# Patient Record
Sex: FEMALE | Race: BLACK OR AFRICAN AMERICAN | Employment: FULL TIME | URBAN - METROPOLITAN AREA
[De-identification: names, ages, dates, MRNs, and addresses within clinical notes are randomized per-mention and may not be internally consistent; named-entity substitution may affect disease eponyms.]

---

## 2017-02-01 ENCOUNTER — OFFICE VISIT (OUTPATIENT)
Dept: BARIATRICS/WEIGHT MGMT | Age: 32
End: 2017-02-01

## 2017-02-01 DIAGNOSIS — E66.9 OBESITY, CLASS II, BMI 35-39.9: Primary | ICD-10-CM

## 2017-02-02 LAB
% ALBUMIN, 58A: 58 % (ref 54–71)
ABSOLUTE IMMATURE GRANULOCYTES, AIG: 0 X10E3/UL (ref 0–0.1)
ADIPONECTIN: 9 UG/ML
ALB/GLOBRATIO, 58C: 1.4 (ref 1.15–2.5)
ALBUMIN SERPL-MCNC: 4.5 G/DL (ref 3.7–5.1)
ALP SERPL-CCNC: 69 U/L (ref 35–125)
ALT SERPL-CCNC: 12 U/L
ANION GAP SERPL CALC-SCNC: 13 MMOL/L (ref 6–18)
APOLIPOPROTEIN B , 48: 126 MG/DL
AST SERPL W P-5'-P-CCNC: 13 U/L (ref 5–32)
BASOPHILS # BLD: 1 % (ref 0–3)
BASOPHILS NFR BLD: 0.1 X10E3/UL (ref 0–0.2)
BILIRUB SERPL-MCNC: 0.5 MG/DL
BUN SERPL-MCNC: 6 MG/DL (ref 6–20)
BUN/CREATININE RATIO,BUCR: 10 (ref 10–27)
CALCIUM SERPL-MCNC: 9.6 MG/DL (ref 8.8–10.5)
CHLORIDE SERPL-SCNC: 104 MMOL/L (ref 98–110)
CHOLEST SERPL-MCNC: 228 MG/DL
CO2 SERPL-SCNC: 23 MMOL/L (ref 19–31)
CREAT SERPL-MCNC: 0.6 MG/DL (ref 0.5–0.9)
CRP SERPL HS-MCNC: 3 MG/L
EOSINOPHIL # BLD: 4 % (ref 0–7)
EOSINOPHIL NFR BLD: 0.3 X10E3/UL (ref 0–0.4)
ERYTHROCYTE [DISTWIDTH] IN BLOOD BY AUTOMATED COUNT: 14.1 % (ref 11.7–15)
ESTIMATED AVERAGE GLUCOSE, EAG: 122.6 MG/DL
FIBRINOGEN ANTIGEN, 117051: 508 MG/DL (ref 126–437)
GLOBCALC, 58B: 3.2 G/DL (ref 1.9–3.5)
GLUCOSE SERPL-MCNC: 81 MG/DL (ref 70–99)
GRANULOCYTES,GRANS: 60 % (ref 40–74)
HCT VFR BLD AUTO: 41 % (ref 34–44)
HDLC SERPL-MCNC: 58 MG/DL
HGB BLD-MCNC: 13.4 G/DL (ref 11.5–15)
HGBA1C-T, HDL6300: 5.9 %
INSULIN,INS: 16 UU/ML (ref 3–9)
LDLC SERPL CALC-MCNC: 167 MG/DL
LP(A)-P, HDL4000: 268 NMOL/L
LP-PLA2, 123241: 329 NG/ML
LYMPHOCYTES # BLD: 2.8 X10E3/UL (ref 0.7–4.5)
LYMPHOCYTES NFR BLD: 30 % (ref 14–46)
Lab: 0
Lab: 1.02 (ref 1–1.03)
Lab: 8
Lab: ABNORMAL
Lab: CLEAR
Lab: PRESENT
Lab: YELLOW
MAGNESIUM SERPL-MCNC: 2.1 MG/DL (ref 1.6–2.4)
MCH RBC QN AUTO: 25 PG (ref 27–34)
MCHC RBC AUTO-ENTMCNC: 32 G/DL (ref 32–36)
MCV RBC AUTO: 79 FL (ref 80–98)
MONOCYTES # BLD: 0.5 X10E3/UL (ref 0.1–1)
MONOCYTES NFR BLD: 5 % (ref 4–13)
MPOAU: 419 PMOL/L
NEUTROPHILS # BLD AUTO: 5.6 X10E3/UL (ref 1.8–7.8)
NON-HDL CHOLESTEROL, 011976: 170 MG/DL
PLATELET # BLD AUTO: 443 X10E3/UL (ref 140–415)
POTASSIUM SERPL-SCNC: 4.5 MMOL/L (ref 3.5–5.3)
PROT SERPL-MCNC: 7.7 G/DL (ref 6.1–8)
RBC # BLD AUTO: 5.3 X10E6/UL (ref 3.8–5.1)
SMALL DENSE LDL, 47: 34 MG/DL
SODIUM SERPL-SCNC: 140 MMOL/L (ref 133–145)
TRIG ALIAS-LP(A)-P: 83
TRIGL SERPL-MCNC: 83 MG/DL (ref ?–150)
TSH SERPL-ACNC: 9.22 UIU/ML (ref 0.27–4.2)
URATE SERPL-MCNC: 4.7 MG/DL (ref 2–6.9)
WBC # BLD AUTO: 9.4 X10E3/UL (ref 4–10.5)

## 2017-02-03 LAB
25(OH)D3 SERPL-MCNC: 12 NG/ML (ref 30–100)
ALPHA LINOLEIC ACID N3, HDL1202: 0.17 % (ref 0.1–0.4)
CIS-MONO-UNSATURATED FATTY ACID TOTAL, HDL1205: 14 (ref 11.5–20.5)
DOCOSAPENTAENOIC ACID N3, HDL1206: 2.63 % (ref 0.6–4.1)
DOCOSAPENTAENOIC ACID N6, HDL1207: 0.91 % (ref 0.1–1.3)
HDL HDL-P, HDL5001: 42.7 UMOL/L
HDL LDL-P, HDL5000: 2081 NMOL/L
HDL SLDL-P, HDL5002: 1114 NMOL/L
LEPTIN, SERUM, 146711: 90 NG/ML
OMEGA-3 FATTY ACID TOTAL, HDL1220: 6.9 (ref 0.1–14.1)
OMEGA-3 INDEX, HDL1219: 4.1 (ref 0.1–10.4)
OMEGA-6 FATTY ACID TOTAL, HDL1222: 37.4 (ref 28.6–44.5)
SATURATED FATTY ACID TOTAL, HDL1226: 41 (ref 36.6–42)
TRANS FATTY ACID TOTAL, HDL1232: 0.7 (ref 0.1–1.8)
TRANSLINOLEIC ACID, HDL1229: 0.11 % (ref 0.1–0.5)
TRANSOLEIC ACID, HDL1230: 0.5 % (ref 0.1–1.3)

## 2017-02-05 LAB
CAMPESTEROL RATIO, HDL1303: 131 (ref 115–240)
CAMPESTEROL, HDL1302: 3.09 UG/ML (ref 2.11–4.43)
CHOLESTANOL RATIO, HDL1305: 81 (ref 117–194)
CHOLESTANOL, HDL1304: 1.86 UG/ML (ref 2.02–3.47)
DESMOSTEROL RATIO, HDL1307: 58 (ref 31–64)
DESMOSTEROL, HDL1306: 1.32 UG/ML
SITOSTEROL RATIO, HDL1301: 79 (ref 76–168)
SITOSTEROL, HDL1300: 1.93 UG/ML (ref 1.43–3.17)

## 2017-02-06 ENCOUNTER — TELEPHONE (OUTPATIENT)
Dept: FAMILY MEDICINE CLINIC | Age: 32
End: 2017-02-06

## 2017-02-06 NOTE — TELEPHONE ENCOUNTER
Patient calling to say she is changing pharmacies from [facility] to [facility] on [facility] and [facility]. Her contact # is [phone].

## 2017-02-07 NOTE — PROGRESS NOTES
Patient attended a Medically Supervised Weight Loss New Patient Orientation today where we discussed:  - New Direction Very Low Calorie Diet details  - Medical Supervision  - Nutrition education  - Cost  - Policies and compliance required for program enrollment. - Lab slip was given to patient with instructions for having them drawn. Patients initial consultation with physician is tentatively scheduled for:  Future Appointments  Date Time Provider Shawanda Garcia   2/15/2017 8:30 AM DO NEHA Argueta [address] starting weight was documented as: There were no vitals taken for this visit. Patient attended a Medically Supervised Weight Loss New Patient Orientation today where we discussed:  - New Direction Very Low Calorie Diet details  - Medical Supervision  - Nutrition education  - Cost  - Policies and compliance required for program enrollment. - Lab slip was given to patient with instructions for having them drawn. Patients initial consultation with physician is tentatively scheduled for:  Future Appointments  Date Time Provider Shawanda Garcia   2/15/2017 8:30 AM DO NEHA Argueta REKHA SCHED         There were no vitals taken for this visit.

## 2017-02-15 ENCOUNTER — OFFICE VISIT (OUTPATIENT)
Dept: FAMILY MEDICINE CLINIC | Age: 32
End: 2017-02-15

## 2017-02-15 VITALS
OXYGEN SATURATION: 98 % | HEIGHT: 62 IN | SYSTOLIC BLOOD PRESSURE: 108 MMHG | HEART RATE: 84 BPM | DIASTOLIC BLOOD PRESSURE: 77 MMHG | WEIGHT: 210.8 LBS | TEMPERATURE: 98.5 F | BODY MASS INDEX: 38.79 KG/M2 | RESPIRATION RATE: 18 BRPM

## 2017-02-15 DIAGNOSIS — E78.00 PURE HYPERCHOLESTEROLEMIA: ICD-10-CM

## 2017-02-15 DIAGNOSIS — R73.9 HYPERGLYCEMIA: ICD-10-CM

## 2017-02-15 DIAGNOSIS — E66.9 OBESITY, CLASS II, BMI 35-39.9: Primary | ICD-10-CM

## 2017-02-15 DIAGNOSIS — Z13.31 DEPRESSION SCREENING: ICD-10-CM

## 2017-02-15 DIAGNOSIS — E63.0 ESSENTIAL FATTY ACID (EFA) DEFICIENCY: ICD-10-CM

## 2017-02-15 DIAGNOSIS — E16.1 HYPERINSULINEMIA: ICD-10-CM

## 2017-02-15 DIAGNOSIS — R79.82 ELEVATED C-REACTIVE PROTEIN (CRP): ICD-10-CM

## 2017-02-15 DIAGNOSIS — E03.4 HYPOTHYROIDISM DUE TO ACQUIRED ATROPHY OF THYROID: ICD-10-CM

## 2017-02-15 DIAGNOSIS — E55.9 VITAMIN D DEFICIENCY: ICD-10-CM

## 2017-02-15 DIAGNOSIS — Z82.49 FAMILY HISTORY OF HEART DISEASE IN MALE FAMILY MEMBER BEFORE AGE 55: ICD-10-CM

## 2017-02-15 RX ORDER — ERGOCALCIFEROL 1.25 MG/1
50000 CAPSULE ORAL
Qty: 5 CAP | Refills: 2 | Status: SHIPPED | OUTPATIENT
Start: 2017-02-15 | End: 2017-04-16

## 2017-02-15 NOTE — PATIENT INSTRUCTIONS
Learning About Healthy Weight  What is a healthy weight? A healthy weight is the weight at which you feel good about yourself and have energy for work and play. It's also one that lowers your risk for health problems. What can you do to stay at a healthy weight? It can be hard to stay at a healthy weight, especially when fast food, vending-machine snacks, and processed foods are so easy to find. And with your busy lifestyle, activity may be low on your list of things to do. But staying at a healthy weight may be easier than you think. Here are some dos and don'ts for staying at a healthy weight:  Do eat healthy foods  The kinds of foods you eat have a big impact on both your weight and your health. Reaching and staying at a healthy weight is not about going on a diet. It's about making healthier food choices every day and changing your diet for good. Healthy eating means eating a variety of foods so that you get all the nutrients you need. Your body needs protein, carbohydrate, and fats for energy. They keep your heart beating, your brain active, and your muscles working. On most days, try to eat from each food group. This means eating a variety of:  · Whole grains, such as whole wheat breads and pastas. · Fruits and vegetables. · Dairy products, such as low-fat milk, yogurt, and cheese. · Lean proteins, such as all types of fish, chicken without the skin, and beans. Don't have too much or too little of one thing. All foods, if eaten in moderation, can be part of healthy eating. Even sweets can be okay. If your favorite foods are high in fat, salt, sugar, or calories, limit how often you eat them. Eat smaller servings, or look for healthy substitutes. Do watch what you eat  Many people eat more than their bodies need. Part of staying at a healthy weight means learning how much food you really need from day to day and not eating more than that.  Even with healthy foods, eating too much can make you gain weight. Having a well-balanced diet means that you eat enough, but not too much, and that your food gives you the nutrients you need to stay healthy. So listen to your body. Eat when you're hungry. Stop when you feel satisfied. It's a good idea to have healthy snacks ready for when you get hungry. Keep healthy snacks with you at work, in your car, and at home. If you have a healthy snack easily available, you'll be less likely to pick a candy bar or bag of chips from a vending machine instead. Some healthy snacks you might want to keep on hand are fruit, low-fat yogurt, string cheese, low-fat microwave popcorn, raisins and other dried fruit, nuts, whole wheat crackers, pretzels, carrots, celery sticks, and broccoli. Do some physical activity  A big part of reaching and staying at a healthy weight is being active. When you're active, you burn calories. This makes it easier to reach and stay at a healthy weight. When you're active on a regular basis, your body burns more calories, even when you're at rest. Being active helps you lose fat and build lean muscle. Try to be active for at least 1 hour every day. This may sound like a lot, but it's okay to be active in smaller blocks of time that add up to 1 hour a day. Any activity that makes your heart beat faster and keeps it there for a while counts. A brisk walk, run, or swim will get your heart beating faster. So will climbing stairs, shooting baskets, or cycling. Even some household chores like vacuuming and mowing the lawn will get your heart rate up. Pick activities that you enjoyones that make your heart beat faster, your muscles stronger, and your muscles and joints more flexible. If you find more than one thing you like doing, do them all. You don't have to do the same thing every day. Don't diet  Diets don't work. Diets are temporary. Because you give up so much when you diet, you may be hungry and think about food all the time.  And after you stop dieting, you also may overeat to make up for what you missed. Most people who diet end up gaining back the pounds they lostand more. Remember that healthy bodies come in lots of shapes and sizes. Everyone can get healthier by eating better and being more active. Where can you learn more? Go to http://anna-alistair.info/. Enter 192 5067 in the search box to learn more about \"Learning About Healthy Weight. \"  Current as of: February 16, 2016  Content Version: 11.1  © 4467-7290 [employer]. Care instructions adapted under license by [employer] (which disclaims liability or warranty for this information). If you have questions about a medical condition or this instruction, always ask your healthcare professional. [address] any warranty or liability for your use of this information. Learning About Obesity  What is obesity? Obesity means having so much body fat that your health is in danger. Having too much body fat can lead to type 2 diabetes, heart disease, high blood pressure, arthritis, sleep apnea, and stroke. Even if you don't feel bad now, think about these health risks. Do they seem like a good reason to start on a new path toward a healthier weight? Or do you have another personal, powerful reason for wanting to lose weight? Whatever it is, keep it in mind. It can be hard to change eating habits and exercise habits. But with your own reason and plan, you can do it. How do you know if your weight is in the obesity range? To know if your weight is in the obesity range, your doctor looks at your body mass index (BMI) and waist size. Your BMI is a number that is calculated from your weight and your height. To figure your BMI for yourself, get a BMI table from your doctor or use an online tool, such as http://www.schmidt.com/ on the [employer] of [employer].   What causes obesity? When you take in more calories than you burn off, you gain weight. How you eat, how active you are, and other things affect how your body uses calories and whether you gain weight. If you have family members who have too much body fat, you may have inherited a tendency to gain weight. And your family also helps form your eating and lifestyle habits, which can lead to obesity. Also, our busy lives make it harder to plan and cook healthy meals. For many of us, it's easier to reach for prepared foods, go out to eat, or go to the drive-through. But these foods are often high in saturated fat and calories. Portions are often too large. What can you do to reach a healthy weight? Focus on health, not diets. Diets are hard to stay on and don't work in the long run. It is very hard to stay with a diet that includes lots of big changes in your eating habits. Instead of a diet, focus on lifestyle changes that will improve your health and achieve the right balance of energy and calories. To lose weight, you need to burn more calories than you take in. You can do it by eating healthy foods in reasonable amounts and becoming more active, even a little bit every day. Making small changes over time can add up to a lot. Make a plan for change. Many people have found that naming their reasons for change and staying focused on their plan can make a big difference. Work with your doctor to create a plan that is right for you. · Ask yourself: Illona Bogus are my personal, most powerful reasons for wanting this change? What will my life look like when I've made the change? \"  · Set your long-term goal. Make it specific, such as \"I will lose x pounds. \"  · Break your long-term goal into smaller, short-term goals. Make these small steps specific and within your reach, things you know you can do. These steps are what keep you going from day to day. How can you stay on your plan for change? Be ready.  Choose to start during a time when there are few events that might trigger slip-ups, like holidays, social events, and high-stress periods. Decide on your first few steps. Most people have more success when they make small changes, one step at a time. For example, you might switch a daily candy bar to a piece of fruit, walk 10 minutes more, or add more vegetables to a meal.  Line up your support people. Make sure you're not going to be alone as you make this change. Connect with people who understand how important it is to you. Ask family members and friends for help in keeping with your plan. And think about who could make it harder for you, and how to handle them. Try tracking. People who keep track of what they eat, feel, and do are better at losing weight. Try writing down things like:  · What and how much you eat. · How you feel before and after each meal.  · Details about each meal (like eating out or at home, eating alone, or with friends or family). · What you do to be active. Look and plan. As you track, look for patterns that you may want to change. Take note of:  · When you eat and whether you skip meals. · How often you eat out. · How many fruits and vegetables you eat. · When you eat beyond feeling full. · When and why you eat for reasons other than being hungry. When you stray from your plan, don't get upset. Figure out what made you slip up and how you can fix it. Can you take medicines or have surgery to lose weight? Before your doctor will prescribe medicines or surgery, he or she will probably want you to be more active and follow your healthy eating plan for a period of time. These habits are key lifelong changes for managing your weight, with or without other medical treatment. And these changes can help you avoid weight-related health problems. Follow-up care is a key part of your treatment and safety. Be sure to make and go to all appointments, and call your doctor if you are having problems.  It's also a good idea to know your test results and keep a list of the medicines you take. Where can you learn more? Go to http://anna-alistair.info/. Enter N111 in the search box to learn more about \"Learning About Obesity. \"  Current as of: February 16, 2016  Content Version: 11.1  © 3139-5792 [employer]. Care instructions adapted under license by [employer] (which disclaims liability or warranty for this information). If you have questions about a medical condition or this instruction, always ask your healthcare professional. [address] any warranty or liability for your use of this information. Starting a Weight Loss Plan: Care Instructions  Your Care Instructions  If you are thinking about losing weight, it can be hard to know where to start. Your doctor can help you set up a weight loss plan that best meets your needs. You may want to take a class on nutrition or exercise, or join a weight loss support group. If you have questions about how to make changes to your eating or exercise habits, ask your doctor about seeing a registered dietitian or an exercise specialist.  It can be a big challenge to lose weight. But you do not have to make huge changes at once. Make small changes, and stick with them. When those changes become habit, add a few more changes. If you do not think you are ready to make changes right now, try to pick a date in the future. Make an appointment to see your doctor to discuss whether the time is right for you to start a plan. Follow-up care is a key part of your treatment and safety. Be sure to make and go to all appointments, and call your doctor if you are having problems. Its also a good idea to know your test results and keep a list of the medicines you take. How can you care for yourself at home? · Set realistic goals. Many people expect to lose much more weight than is likely.  A weight loss of 5% to 10% of your body weight may be enough to improve your health. · Get family and friends involved to provide support. Talk to them about why you are trying to lose weight, and ask them to help. They can help by participating in exercise and having meals with you, even if they may be eating something different. · Find what works best for you. If you do not have time or do not like to cook, a program that offers meal replacement bars or shakes may be better for you. Or if you like to prepare meals, finding a plan that includes daily menus and recipes may be best.  · Ask your doctor about other health professionals who can help you achieve your weight loss goals. ¨ A dietitian can help you make healthy changes in your diet. ¨ An exercise specialist or  can help you develop a safe and effective exercise program.  ¨ A counselor or psychiatrist can help you cope with issues such as depression, anxiety, or family problems that can make it hard to focus on weight loss. · Consider joining a support group for people who are trying to lose weight. Your doctor can suggest groups in your area. Where can you learn more? Go to http://anna-alistair.info/. Enter V648 in the search box to learn more about \"Starting a Weight Loss Plan: Care Instructions. \"  Current as of: February 16, 2016  Content Version: 11.1  © 3046-2634 [employer]. Care instructions adapted under license by [employer] (which disclaims liability or warranty for this information). If you have questions about a medical condition or this instruction, always ask your healthcare professional. [place] [address] any warranty or liability for your use of this information. WEIGHT LOSS PLAN    1. Read food labels and count calories. Goal 2590-4432 calories daily for women and 2391-7545 calories daily for men. Achieve this by decreasing caloric intake by 500 calories by weekly increments.   NOTE:  1 pound = 3500 calories! Www.loseit. [place]  Www.myfitnesspal.com  Www.Ungalli. Affle  Www.[employer]ference[employer]. gov  Weight watchers mobile    Calories needed to lose 1-2 pounds a week = 10 x your weight in pounds    2. Increase water intake. Per [facility] Weight loss Program, it is important to drink 1/2 your body weight in ounces of water daily. Decrease your water consumption 2-3 hours before bedtime to prevent sleep disturbance from frequent urination. 3.  Decrease sugary beverages. Each can or glass of soda increases your risk of obesity by 60%. Can lose 10 pounds in a month by avoiding any soda. 12 oz can of soda = 140 calories, 16 oz cup of sweet tea = 200 calories    16 oz orange juice = 200 calories, 10 oz apple juice = 150 calories   32 oz sports drink = 200 calories, 16 oz punch = 240 calories   3.5 oz alcohol = 100-150 calories     4. Avoid High Fructose Corn Syrup products. This ingredient makes products highly addictive! 5. Exercise 30 minutes daily 5 days weekly, minimally. If you burn 3500 calories that equals a pound! Use a pedometer to count steps. Visit www. [employer] for a free pedometer and diet recommendations. Your maximum heart rate = 220 - your age    Never exercise at your maximum heart rate. See handout for target heart rate. 6.  Decrease carbohydrates (white bread, pasta, rice, potatoes, sweet foods and sweet drinks like soda, tea, coffee, juice and sports drinks). Increase fiber and protein. Goal:   calories daily of carbohydrates     Try CHROMIUM PICONATE 200 MG THREE TIMES DAILY,    to decrease Premenstrual carbohydrate cravings. 7.  Eat 3-5 small meals daily, include lots of protein (beans/legumes, nuts, lean meat, eggs) and green vegetables with each. (Breakfast, lunch, dinner with 2 healthy snacks)    8. Get proper rest 7-8 hours uninterrupted.   When you get less than 6 hours, it triggers hunger by affecting your [place]:Leptin ratio and this results in weight gain. 9.  Watch your food portions. Green leafy vegetables should cover 1/2 of the plate, lean meat 1/4 of the plate, starchy vegetable 1/4 of the plate. Use smaller plates. 10.  Do not eat until you are full. Eat until you are no longer hungry. If you are not sure, try drinking a glass of water before getting your second serving of food. 11.  Do not weigh yourself daily. Wait until your next office visit. Use how you feel and  how your clothes fit as measurements of success. 12.  Address your spirituality to draw strength from above during your journey. Remember \"I am fearfully and wonderfully made. Marvelous in His eyes. \"    13. Set realistic, appropriate and achievable weight loss goals:     RECOMMENDED TARGET WEIGHT LOSS:  Initial weight loss of 5-10% of your initial body weight achieved over 6 months or a decrease of 2 BMI units. MINIMUM GOAL OF WEIGHT LOSS:  Reduce body weight and maintain a lower body weight. Prevent weight gain. RELATED WEBSITES:      Www.obesityaction. org  (and consider joining the Obesity Action Coalition for $25/year. The [address] [place] [address] mission is to elevated and empower those affected by obesity through education, advocacy and support. Quarterly journals included in membership fee. )    Www.[employer]  www.[employer]Revenue     RELATED DIETS:  Dr. Steff Gracia Weight loss challenge, Mediterranean diet, [address] [place] [address] Watchers, Paleo Diet (anti-inflammatory diet)        EXERCISE 150 MINUTES WEEKLY. THIS CAN BE ACHIEVED BY WORKING OUT OR WALKING A MINIMUM OF 30 MINUTES FOR 5 DAYS WEEKLY. YOU CAN EXERCISE IN INCREMENTS OF 10-15 MINUTES UP TO 3 TIMES A DAY. Consider performing \"brainless exercise. \"  Choose your favorite tv program.  Five minutes before and for 5 minutes after the tv program, stretch your body. While the program is on, walk in place watching the show.   When commercials come on, rest or walk around the house to do other things. When the program begins, return to walking in place. When you are able keep walking during the commercials and add light weights to your ankles or hands. By the end of the show, you would have walked 30 minutes. If you need shorter spurts of exercise, walk during the commercials and rest during the show. Drink to glasses of water prior to any exercise to prevent dehydration and to improve the results of the work out. Your RESTING HEART RATE is the number of times your heart beats per minute when you are not exerting yourself. The more fit you are, the lower your resting heart rate will be. Your MAXIMUM HEART RATE is the number of times per minute your heart pumps when it is working at 100% capacity. NEVER EXERCISE AT YOUR MAXIMUM HEART RATE. MAX HEART RATE = 220 - your age    For example, in a 48year old, the maximum heart rate is 220-50 = 170 beats per minute    Your [place] is the number of beats per minute our heart should pump during aerobic exercise. Reaching your target heart rate indicates that your body is receiving maximum cardiovascular and fat burning benefits.      If you are fit, your TARGET HEART RATE = 70-85% of your maximum Heart rate      For a 48year old with vigorous intensity physical activity,         Target heart rate= 170 bpm x .70 = 119 bpm     Target heart rate = 170 bpm x .85=  145 bpm        Therefore, your target heart rate during physical activity is 119-145      If you are not fit, TARGET HEART RATE = 50-70% of your maximum Heart rate    MODERATE CONSISTENT AEROBIC EXERCISE OR WALKING FOR AT LEAST 150 MINUTES WEEKLY IS AN ESSENTIAL PART OF ANY WEIGHT LOSS OF WEIGHT MAINTENANCE PROGRAM.     During WEIGHT LOSS PHASE, at least 75% of your exercise needs to be walking or moderate aerobic activity and 25% or 0% can be Isometric or Resistance type exercises (the latter can be deferred to the weight maintenance phase.)     During WEIGHT MAINTENANCE PHASE, at least 50% of your exercise needs to be aerobic and 50% Isometric or Resistance type exercises. BENEFITS OF MODERATE INTENSITY EXERCISE MOST DAYS OF THE WEEK:     1. Insulin Resistance improves 30-85%   2. Abdominal Fat decreases by 30%   3. Inflammatory Markers decrease by 30% (therefore pain decreases)   4. Systolic and Diastolic Blood Pressure decrease by 4 mmHg   5. HDL improves by 5%   6. Triglycerides decrease by 15%   7. Shift from small dense LDL to large dense LDL (therefore decrease Insulin Resistance)   8. Decrease coagulability                  Starting a Weight Loss Plan: Care Instructions  Your Care Instructions  If you are thinking about losing weight, it can be hard to know where to start. Your doctor can help you set up a weight loss plan that best meets your needs. You may want to take a class on nutrition or exercise, or join a weight loss support group. If you have questions about how to make changes to your eating or exercise habits, ask your doctor about seeing a registered dietitian or an exercise specialist.  It can be a big challenge to lose weight. But you do not have to make huge changes at once. Make small changes, and stick with them. When those changes become habit, add a few more changes. If you do not think you are ready to make changes right now, try to pick a date in the future. Make an appointment to see your doctor to discuss whether the time is right for you to start a plan. Follow-up care is a key part of your treatment and safety. Be sure to make and go to all appointments, and call your doctor if you are having problems. Its also a good idea to know your test results and keep a list of the medicines you take. How can you care for yourself at home? · Set realistic goals. Many people expect to lose much more weight than is likely.  A weight loss of 5% to 10% of your body weight may be enough to improve your health. · Get family and friends involved to provide support. Talk to them about why you are trying to lose weight, and ask them to help. They can help by participating in exercise and having meals with you, even if they may be eating something different. · Find what works best for you. If you do not have time or do not like to cook, a program that offers meal replacement bars or shakes may be better for you. Or if you like to prepare meals, finding a plan that includes daily menus and recipes may be best.  · Ask your doctor about other health professionals who can help you achieve your weight loss goals. ¨ A dietitian can help you make healthy changes in your diet. ¨ An exercise specialist or  can help you develop a safe and effective exercise program.  ¨ A counselor or psychiatrist can help you cope with issues such as depression, anxiety, or family problems that can make it hard to focus on weight loss. · Consider joining a support group for people who are trying to lose weight. Your doctor can suggest groups in your area. Where can you learn more? Go to http://anna-alistair.info/. Enter A403 in the search box to learn more about \"Starting a Weight Loss Plan: Care Instructions. \"  Current as of: February 16, 2016  Content Version: 11.1  © 6634-0636 [employer]. Care instructions adapted under license by [employer] (which disclaims liability or warranty for this information). If you have questions about a medical condition or this instruction, always ask your healthcare professional. [place] [address] any warranty or liability for your use of this information.

## 2017-02-15 NOTE — PROGRESS NOTES
[facility] Progress Note: Initial Physician Visit     Tito Campos is a 32 y.o. female who is here for medical screening for entering the [facility].     CC: *** Obesity      Weight History  I personally reviewed the Medical Screening [place] completed by patient and scanned into media section of chart. It includes duration of their obesity, maximum weight, goal weight and weight gain time line (timing), all of which give the context of their obesity AND a Family History of their obesity. Is their Weight Loss Goal entered in to Comments? ***    Weight loss History  I personally reviewed the Medical Screening [place] completed by the patient and scanned into media section of chart. It includes number of weight loss attempts, the weight loss program that patients was most successful using, and if they have any hx of anorectic medication use, including OTC supplements. This captures modifying factors. Significant Medical History  Past Medical History   Diagnosis Date    Allergy, unspecified not elsewhere classified     Ankle sprain 2002     bilaterally. 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 Dr. Bernadine George, OB. Royce Addison, Genetic Counselor   Jordy Davies.  Elevated liver enzymes 2009    Heart palpitations 07/12/2016     Exertional.  Dr. Jak Reece. Negative Exercise Stress Test.    Hyperinsulinemia 5/22/2015    Hypothyroidism due to acquired atrophy of thyroid 2011    Pure hypercholesterolemia 07/2013       I personally reviewed the Medical Screening [place] completed by the patient and scanned into media section of chart. This allows me to assess associated symptoms that are significant in the assessment of the patient's obesity and the patient's Past Medical History.     Outpatient Prescriptions Marked as Taking for the 2/15/17 encounter (Office Visit) with Mario Llanos, DO   Medication Sig Dispense Refill    levothyroxine (SYNTHROID) 137 mcg tablet TAKE ONE TABLET BY MOUTH DAILY BEFORE BREAKFAST 27 Tab 11    SPRINTEC, 28, 0.25-35 mg-mcg tab 1 Tab daily. 5    albuterol (PROVENTIL HFA, VENTOLIN HFA) 90 mcg/actuation inhaler Take 2 Puffs by inhalation every four (4) hours as needed for Wheezing. Indications: BRONCHOSPASM PREVENTION 1 Inhaler 1         Significant Psychosocial History   Has a doctor every diagnosed with Binge Eating Disorder, Bulemia or Anorexia? : {gen no default/yes/free text:356258::\"no\"}     Compliance  Upcoming Travel? {gen no default/yes/free text:711042::\"no\"}    Social History  Social History   Substance Use Topics    Smoking status: Never Smoker    Smokeless tobacco: Never Used    Alcohol use 0.0 oz/week     0 Standard drinks or equivalent per week      Comment: occasional wine cooler       Exercise  I personally reviewed the Medical Screening [place] completed by the patient and scanned into media section of chart. Review of Systems  See HPI        Objective  Visit Vitals    /77 (BP 1 Location: Left arm, BP Patient Position: Sitting)    Pulse 84    Temp 98.5 °F (36.9 °C) (Oral)    Resp 18    Ht 5' 2.4\" (1.585 m)    Wt 210 lb 12.8 oz (95.6 kg)    LMP 12/22/2016 (Exact Date)    SpO2 98%    BMI 38.06 kg/m2         Weight Metrics 2/15/2017 2/15/2017 11/28/2016 11/28/2016 9/27/2016 9/27/2016 8/23/2016   Weight - 210 lb 12.8 oz - 200 lb 11.2 oz - 197 lb 9.6 oz -   Waist Measure Inches 43 - 41.75 - 44.5 - 42.5   Body Fat % 41.8 - 41.8 - 41.5 - 42.1   BMI - 38.06 kg/m2 - 36.71 kg/m2 - 36.14 kg/m2 -       Labs: See HDL labs scanned into Media section       Physical Exam    Vital Signs Reviewed  Weight Management Metrics Reviewed    Appearance: ***  HEENT:  Scleral icterus? {gen no default/yes/free text:061496::\"no\"}  Neck:  Thyromegaly or nodules?   {gen no default/yes/free text:458508::\"no\"}  Mouth: Large tongue {gen no default/yes/free text:270716::\"no\"}  Heart:  ***  Lungs:  ***  Abdomen:                Hepatomegaly? {gen no default/yes/free text:531905::\"no\"}              Striae present? {gen no default/yes/free text:510473::\"no\"}  Skin:               Acne? {gen no default/yes/free text:553753::\"no\"}              Hirsutism? {gen no default/yes/free text:651686::\"no\"}              Skin tags? {gen no default/yes/free text:645466::\"no\"}              Acanthosis Nigricans? {gen no default/yes/free text:450840::\"no\"}  Ext:  Edema? {gen no default/yes/free text:223716::\"no\"}      Assessment & Plan  No diagnosis found. 1. HDL labs reviewed w/ patient  2. EKG reviewed w/ patient:              Personally reviewed by me, NSR, No abnormalities,               QTc = *** sec (Upper limit is 440 for males & 460 for females)      3.  Medication changes include: ***    Based on his history, labs and EKG, Laura Emmanuel ***  a good candidate for the [facility] Loss Program     25 min of the 45 minutes face to face time with Laura consisted of counseling & coordinating and/or discussing treatment plans in reference to her *** @D

## 2017-02-15 NOTE — PROGRESS NOTES
Jn Marcano Medically Supervised   [facility]   [facility]    INITIAL PHYSICIAN VISIT    HISTORY OF PRESENT ILLNESS  Agree with nurse and registered dietician notes. Tito Campos is a 32 y.o. female with Obesity Class II, Body mass index is 38.06 kg/(m^2). and associated health consequences presents for evaluation and treatment of weight management at the kind request of Lorenza Brantley DO, pcp. Hyperglycemic, hyperinsulinemic pt with hypercholesterolemia, hypothyroidism, Vitamin D deficiency, and family hx of heart disease denies vision changes, headaches, dizziness, chest pain, SOB, or swelling. She did visit the virtual doctors and was dx'd with URI. They recommended OTC medications, which she takes on ocaassion prn. Feeling better. Weight History  Current weight 210 lbs, up from 200 lbs on 16. Lowest weight was 197 lbs in 2105. Highest weight was 225 lbs on 3/23/15. Goal weight is 160 lbs. How many pounds do you want to lose and by when? 50 lbs. How many weight loss attempts have you had? Previously ordered Nutrisystem but did not commit because the food was too salty and the food  quickly. Have you ever taken weight loss medications or herbal remedies? Yes   If yes: were they: rx'd by a doctor or OTC? Phendimetrazine last rx'd on 16, Phentermine in 2016, and Metformin in 10/2013. If yes: did you have any negative side effects? Metformin caused diarrhea. Have you ever had weight loss surgery? No    Sleep History  She admits she snores and believes it is due to allergies. Denies waking up gasping for air or daytime somnia. Eating Habits  How many times a week do you eat fast food or at restaurants? <2x weekly   Are you skipping meals and if so, why? Breakfast  Which meals do you eat? Lunch, Dinner, and Snack  Do you use a food almaz to track your calories? No  Do you have upcoming travel in the next 6 weeks?  No  Do you have a history of binge eating disorder, anorexia, and bulimia? No     Drinking Habits  How much caffeine do you drink daily? 1 cup of coffee qAM  How much alcohol do you drink daily and weekly? q3 weeks drinks 1 glass of wine ([place])   Do you consume any sugar-sweetened beverages (sodas, teas, juices, etc.)? Coffee with liquid coffee creamer. Cherry juice. How much water do you consume daily? About 3-16 oz bottles. Exercise  How many days a week do you currently exercise? 3   Have you ever been told by a physician not to exercise? No  Do you know of any reason you shouldn't exercise? No  Do you own a pedometer or fitness tracker? Yes, averages 8,000 steps. Has a sedentary job. Do you have a gym membership? No  What's your favorite and least favorite physical activity? Favorite: Squats and Least favorite: Jumping. Are you using a fitness almaz? No    Other History  Any history of drug abuse or dependence? No  Do you have any current major lifestyle changes or stressors? 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 Are you pregnant or planning on becoming pregnant within 6 months? Currently abstaining from sexual activity. How many times have you been pregnant? 2 times and 1 children. Any potential unsupportive people in your life? No. Has 2 other co-workers that are going to be supportive with drinking more water and shakes for breakfast.   Are you ready to lose weight and become healthier?  Yes    Depression Screening  PHQ 2 / 9, over the last two weeks 2/15/2017   Little interest or pleasure in doing things Not at all   Feeling down, depressed or hopeless Not at all   Total Score PHQ 2 0     Pt denies contraindications to participation in VLCD including hx of heart attack in the last 3 months, Type 1 DM, Type 2 DM, Livery or Kidney disease requiring protein restriction, Recent txs for Cancer, Hx of Uric-acid Kidney Stone, Gout, Gall stones, Recent onset of Inflammatory Bowel Disease, or Food Allergies. Written by aakash Lowe, as dictated by Dr. Olivia Yost DO.    ROS    Review of Systems negative except as noted above in HPI. ALLERGIES:    Allergies   Allergen Reactions    Latex Itching    Other Medication Rash     SPRINTEC    Metformin Diarrhea     severe    Mirena [Levonorgestrel] Other (comments)     Severe abdominal pain  GALACTORRHEA       CURRENT MEDICATIONS:    Outpatient Prescriptions Marked as Taking for the 2/15/17 encounter (Office Visit) with [place]oacano Jacob DO   Medication Sig Dispense Refill    levothyroxine (SYNTHROID) 137 mcg tablet TAKE ONE TABLET BY MOUTH DAILY BEFORE BREAKFAST 30 Tab 11       PAST MEDICAL HISTORY:    Past Medical History:   Diagnosis Date    Allergy, unspecified not elsewhere classified     Ankle sprain     bilaterally. 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 Dr. Geovanny Jeffries, OB. Abe Jarrett, Genetic Counselor   Beatrice Arreguin.  Elevated liver enzymes     Heart palpitations 2016    Exertional.  Dr. Krystina Osei. Negative Exercise Stress Test.    Hyperinsulinemia 2015    Hypothyroidism due to acquired atrophy of thyroid     Pure hypercholesterolemia 2013       PAST SURGICAL HISTORY:    Past Surgical History:   Procedure Laterality Date    HX  SECTION      HX DILATION AND CURETTAGE  2016    due to Cystic Hygroma/Terrell's Syndrome fetus. Dr. Neena Mendieta   (placed),  (removed)    IUD. Dr. Bert Mejia.  HX TONSIL AND ADENOIDECTOMY  childhood    HX WISDOM TEETH EXTRACTION  2016    All 4 removed.        FAMILY HISTORY:    Family History   Problem Relation Age of Onset   [facility] Arthritis-osteo Mother     Hypertension Mother    [facility] Migraines Mother     Other Mother      IBS    Hypertension Maternal Uncle     Asthma Maternal Uncle     Hypertension Maternal Grandmother     Other Maternal Grandmother      diverticulosis    Arthritis-osteo Maternal Grandmother     Cancer Maternal Grandfather      prostate    Arthritis-osteo Maternal Grandfather     Other Maternal Aunt      uterine fibroids    Obesity Other        SOCIAL HISTORY:    Social History     Social History    Marital status: SINGLE     Spouse name: N/A    Number of children: N/A    Years of education: N/A     Social History Main Topics    Smoking status: Never Smoker    Smokeless tobacco: Never Used    Alcohol use 0.6 oz/week     0 Standard drinks or equivalent, 1 Glasses of wine per week      Comment: every 3 weeks    Drug use: No    Sexual activity: Yes     Partners: Male     Birth control/ protection: Condom     Other Topics Concern    None     Social History Narrative       IMMUNIZATIONS:    Immunization History   Administered Date(s) Administered    Influenza Vaccine 10/31/2013         PHYSICAL EXAMINATION    Vital Signs    Visit Vitals    /77 (BP 1 Location: Left arm, BP Patient Position: Sitting)    Pulse 84    Temp 98.5 °F (36.9 °C) (Oral)    Resp 18    Ht 5' 2.4\" (1.585 m)    Wt 210 lb 12.8 oz (95.6 kg)    LMP 12/22/2016 (Exact Date)    SpO2 98%    BMI 38.06 kg/m2       Weight Metrics 2/23/2017 2/15/2017 2/15/2017 11/28/2016 11/28/2016 9/27/2016 9/27/2016   Weight 209 lb 14.4 oz - 210 lb 12.8 oz - 200 lb 11.2 oz - 197 lb 9.6 oz   Neck Circ (inches) - 13.5 - - - - -   Waist Measure Inches 41 43 - 41.75 - 44.5 -   Body Fat % - 41.8 - 41.8 - 41.5 -   BMI 37.9 kg/m2 - 38.06 kg/m2 - 36.71 kg/m2 - 36.14 kg/m2       General appearance - Well nourished. Well appearing. Well developed. No acute distress. Obese. Head - Normocephalic. Atraumatic. Eyes - pupils equal and reactive. Extraocular eye movements intact. Sclera anicteric. Mildly injected sclera. Ears - Hearing is grossly normal bilaterally. Nose - normal and patent. No polyps noted.   No erythema. No discharge. Mouth - mucous membranes with adequate moisture. Posterior pharynx normal with cobblestone appearance. No erythema, white exudate or obstruction. Neck - supple. Midline trachea. No carotid bruits noted bilaterally. No thyromegaly noted. Chest - clear to auscultation bilaterally anteriorly and posteriorly. No wheezes. No rales or rhonchi. Breath sounds are symmetrical bilaterally. Unlabored respirations. Heart - normal rate. Regular rhythm. Normal S1, S2. No murmur noted. No rubs, clicks or gallops noted. Abdomen - soft and distended. No masses or organomegaly. No rebound, rigidity or guarding. Bowel sounds normal x 4 quadrants. No tenderness noted. Neurological - awake, alert and oriented to person, place, and time and event. Cranial nerves II through XII intact. Clear speech. Muscle strength is +5/5 x 4 extremities. Sensation is intact to light touch bilaterally. Steady gait. Heme/Lymph - peripheral pulses normal x 4 extremities. No peripheral edema is noted. Musculoskeletal - Intact x 4 extremities. Full ROM x 4 extremities. No pain with movement. Back exam - normal range of motion. No pain on palpation of the spinous processes in the cervical, thoracic, lumbar, sacral regions. No CVA tenderness. No buffalo hump noted. Skin - no rashes, erythema, ecchymosis, lacerations, abrasions, suspicious moles noted. No skin tags or moles. No acanthosis nigricans noted in the axilla or neck. Psychological -   normal behavior, dress and thought processes. Good insight. Good eye contact. Normal affect. Happy mood. Normal speech. DATA REVIEWED    Labs dated 2/1/17 from [facility]. LDL was 167. HDL was 58. Triglycerides were 83. LDL-P was 2081. Small LDL-P was 1114. Hs-CRP was 3.0. Insulin was 16. Hgb A1C was 5.9. Vitamin D was 12.0.     TSH was 9.22. (Off thyroid medication x1 week prior to labs drawn) Leptin was 90. Adiponectin was 9. Creatinine was 0.6. ALT was 12. AST was 13. ALP was 69. SEE SCANNED DOCUMENT FOR COMPLETE PANEL RESULTS.    EKG: normal EKG, normal sinus rhythm. No prolonged QTc noted. ASSESSMENT and PLAN      ICD-10-CM ICD-9-CM    1. Obesity, Class II, BMI 35-39.9 (HCC) E66.01 278.01 AMB POC EKG ROUTINE W/ 12 LEADS, INTER & REP   2. Hypothyroidism due to acquired atrophy of thyroid E03.4 244.8      246.8     uncontrolled due to missed week of medication while son sick   3. Pure hypercholesterolemia E78.00 272.0    4. Vitamin D deficiency E55.9 268.9 ergocalciferol (ERGOCALCIFEROL) 50,000 unit capsule   5. Elevated C-reactive protein (CRP) R79.82 790.95    6. Hyperinsulinemia E16.1 251.1    7. Hyperglycemia R73.9 790.29    8. Family history of heart disease in male family member before age 54 Z80.52 V15.46    5. Essential fatty acid (EFA) deficiency E63.0 269.8    10. Depression screening Z13.89 V79.0      Based on pt history, lab results, EKG, and exam performed today in our office, Bethany Phalen is a good candidate for the [facility] Medically Supervised [facility] Program using the [employer] products for an 800 calorie/day VLCD approach. Referred patient to Edward Evans RD to receive [employer] food products and weekly intervention. Discussed the patient's BMI with her. The BMI follow up plan is as follows: I have counseled this patient on diet and exercise regimens. Addressed weight, diet and exercise with patient. Diet prescription: VLCD (very low calorie diet)/800 calories daily using 3-4 meal replacements daily with [employer] food/beverage products recommended. Consume a minimum of 2 L (67 oz) of water daily while utilizing VLCD to prevent dizziness, dehydration, orthostasis. Avoid sugar sweetened beverages. Recommend pt refer to VLCD manual if experiencing any side effects once program is initiated or call our office.     Exercise prescription: Minimal while using VLCD. Sleep prescription:   A goal of 7-8 hours of uninterrupted sleep is recommended to turn off the Grehlin hormone to be released from the stomach and triggers appetite while promoting weight gain. Proper rest turns on Leptin hormone to be released from white adipose tissue and promotes weight loss. Chart reviewed and updated. Reviewed [facility] Commitment Form, Attendance Policy, and [facility] Agreement and Consent previously discussed with Edward Evans RD and signed by pt. SEE SCANNED DOCUMENTS. Reviewed Coping, Eating, and Exercise Lifestyle Patterns Mini-Quizzes. Discussed appropriate strategies for positive responses. SEE SCANNED DOCUMENTS. Continue current medications and care. Recommend OTC Vitamin B12 1,000 mcg daily. Recommend OTC Magnesium daily x1 week then every other day. Start Vitamin D 50,000IU weekly x3 months and then take OTC Vitamin D 5,000IU daily. Start Fish Oil with EPA and -1000 mg daily. Prescriptions written and sent to pharmacy; medication side effects discussed. Vitamin D 50,000IU. Take Synthroid daily. Do not skip doses. Reviewed and [facility]. Recheck pertinent labs monthly with [facility] lab. Recheck thyroid panel in 6-8 weeks. Additional relevant handouts given and discussed with patient today. Counseled patient on health concerns:  VLCD, weight loss goals, sleep hygiene, and AHA current dietary guildelines. Weight loss goal of 5-10% in 6-12 months has shown significant improvement in obesity and its health consequences. VLCD weight loss expectation of 3-5 pounds weekly during participation. Immunizations noted. Offered empathy, support, legitimation, prayers, partnership to patient. Praised patient for progress. Follow-up Disposition:  Return in about 1 month (around 3/15/2017) for weight, results . Patient was offered a choice/choices in the treatment plan today.   Patient expresses understanding of the plan and agrees with recommendations. More than 50 mins spent face to face with patient and more than 50% of this time spent in counseling and coordinating care and/or discussing treatment plans in reference to The primary encounter diagnosis was Obesity, Class II, BMI 35-39.9 ([address] Diagnoses of Hypothyroidism due to acquired atrophy of thyroid, Pure hypercholesterolemia, Vitamin D deficiency, Hyperinsulinemia, Hyperglycemia, and Family history of heart disease in male family member before age 54 were also pertinent to this visit. .    Written by aakash Huizar, as dictated by Dr. Paulina Renee DO. Documentation True and Accepted by Dyana Rebollar. Bairon Vigil. [id number] [facility] FOR ALLOWING ME THE PRIVILEGE TO PARTICIPATE IN THE CARE OF OUR MUTUAL PATIENT, Laura Emmanuel WITH RESPECT TO THEIR WEIGHT MANAGEMENT. Patient Instructions        Learning About Healthy Weight  What is a healthy weight? A healthy weight is the weight at which you feel good about yourself and have energy for work and play. It's also one that lowers your risk for health problems. What can you do to stay at a healthy weight? It can be hard to stay at a healthy weight, especially when fast food, vending-machine snacks, and processed foods are so easy to find. And with your busy lifestyle, activity may be low on your list of things to do. But staying at a healthy weight may be easier than you think. Here are some dos and don'ts for staying at a healthy weight:  Do eat healthy foods  The kinds of foods you eat have a big impact on both your weight and your health. Reaching and staying at a healthy weight is not about going on a diet. It's about making healthier food choices every day and changing your diet for good. Healthy eating means eating a variety of foods so that you get all the nutrients you need. Your body needs protein, carbohydrate, and fats for energy.  They keep your heart beating, your brain active, and your muscles working. On most days, try to eat from each food group. This means eating a variety of:  · Whole grains, such as whole wheat breads and pastas. · Fruits and vegetables. · Dairy products, such as low-fat milk, yogurt, and cheese. · Lean proteins, such as all types of fish, chicken without the skin, and beans. Don't have too much or too little of one thing. All foods, if eaten in moderation, can be part of healthy eating. Even sweets can be okay. If your favorite foods are high in fat, salt, sugar, or calories, limit how often you eat them. Eat smaller servings, or look for healthy substitutes. Do watch what you eat  Many people eat more than their bodies need. Part of staying at a healthy weight means learning how much food you really need from day to day and not eating more than that. Even with healthy foods, eating too much can make you gain weight. Having a well-balanced diet means that you eat enough, but not too much, and that your food gives you the nutrients you need to stay healthy. So listen to your body. Eat when you're hungry. Stop when you feel satisfied. It's a good idea to have healthy snacks ready for when you get hungry. Keep healthy snacks with you at work, in your car, and at home. If you have a healthy snack easily available, you'll be less likely to pick a candy bar or bag of chips from a vending machine instead. Some healthy snacks you might want to keep on hand are fruit, low-fat yogurt, string cheese, low-fat microwave popcorn, raisins and other dried fruit, nuts, whole wheat crackers, pretzels, carrots, celery sticks, and broccoli. Do some physical activity  A big part of reaching and staying at a healthy weight is being active. When you're active, you burn calories. This makes it easier to reach and stay at a healthy weight.  When you're active on a regular basis, your body burns more calories, even when you're at rest. Being active helps you lose fat and build lean muscle. Try to be active for at least 1 hour every day. This may sound like a lot, but it's okay to be active in smaller blocks of time that add up to 1 hour a day. Any activity that makes your heart beat faster and keeps it there for a while counts. A brisk walk, run, or swim will get your heart beating faster. So will climbing stairs, shooting baskets, or cycling. Even some household chores like vacuuming and mowing the lawn will get your heart rate up. Pick activities that you enjoyones that make your heart beat faster, your muscles stronger, and your muscles and joints more flexible. If you find more than one thing you like doing, do them all. You don't have to do the same thing every day. Don't diet  Diets don't work. Diets are temporary. Because you give up so much when you diet, you may be hungry and think about food all the time. And after you stop dieting, you also may overeat to make up for what you missed. Most people who diet end up gaining back the pounds they lostand more. Remember that healthy bodies come in lots of shapes and sizes. Everyone can get healthier by eating better and being more active. Where can you learn more? Go to http://anna-alistair.info/. Enter 238 [id number] in the search box to learn more about \"Learning About Healthy Weight. \"  Current as of: February 16, 2016  Content Version: 11.1  © 7589-9885 [employer]. Care instructions adapted under license by [employer] (which disclaims liability or warranty for this information). If you have questions about a medical condition or this instruction, always ask your healthcare professional. [place] [address] any warranty or liability for your use of this information. Learning About Obesity  What is obesity? Obesity means having so much body fat that your health is in danger.  Having too much body fat can lead to type 2 diabetes, heart disease, high blood pressure, arthritis, sleep apnea, and stroke. Even if you don't feel bad now, think about these health risks. Do they seem like a good reason to start on a new path toward a healthier weight? Or do you have another personal, powerful reason for wanting to lose weight? Whatever it is, keep it in mind. It can be hard to change eating habits and exercise habits. But with your own reason and plan, you can do it. How do you know if your weight is in the obesity range? To know if your weight is in the obesity range, your doctor looks at your body mass index (BMI) and waist size. Your BMI is a number that is calculated from your weight and your height. To figure your BMI for yourself, get a BMI table from your doctor or use an online tool, such as http://www.[employer] on the [employer] of [employer]. What causes obesity? When you take in more calories than you burn off, you gain weight. How you eat, how active you are, and other things affect how your body uses calories and whether you gain weight. If you have family members who have too much body fat, you may have inherited a tendency to gain weight. And your family also helps form your eating and lifestyle habits, which can lead to obesity. Also, our busy lives make it harder to plan and cook healthy meals. For many of us, it's easier to reach for prepared foods, go out to eat, or go to the drive-through. But these foods are often high in saturated fat and calories. Portions are often too large. What can you do to reach a healthy weight? Focus on health, not diets. Diets are hard to stay on and don't work in the long run. It is very hard to stay with a diet that includes lots of big changes in your eating habits. Instead of a diet, focus on lifestyle changes that will improve your health and achieve the right balance of energy and calories.  To lose weight, you need to burn more calories than you take in. You can do it by eating healthy foods in reasonable amounts and becoming more active, even a little bit every day. Making small changes over time can add up to a lot. Make a plan for change. Many people have found that naming their reasons for change and staying focused on their plan can make a big difference. Work with your doctor to create a plan that is right for you. · Ask yourself: Tim Rojas are my personal, most powerful reasons for wanting this change? What will my life look like when I've made the change? \"  · Set your long-term goal. Make it specific, such as \"I will lose x pounds. \"  · Break your long-term goal into smaller, short-term goals. Make these small steps specific and within your reach, things you know you can do. These steps are what keep you going from day to day. How can you stay on your plan for change? Be ready. Choose to start during a time when there are few events that might trigger slip-ups, like holidays, social events, and high-stress periods. Decide on your first few steps. Most people have more success when they make small changes, one step at a time. For example, you might switch a daily candy bar to a piece of fruit, walk 10 minutes more, or add more vegetables to a meal.  Line up your support people. Make sure you're not going to be alone as you make this change. Connect with people who understand how important it is to you. Ask family members and friends for help in keeping with your plan. And think about who could make it harder for you, and how to handle them. Try tracking. People who keep track of what they eat, feel, and do are better at losing weight. Try writing down things like:  · What and how much you eat. · How you feel before and after each meal.  · Details about each meal (like eating out or at home, eating alone, or with friends or family). · What you do to be active. Look and plan. As you track, look for patterns that you may want to change.  Take note of:  · When you eat and whether you skip meals. · How often you eat out. · How many fruits and vegetables you eat. · When you eat beyond feeling full. · When and why you eat for reasons other than being hungry. When you stray from your plan, don't get upset. Figure out what made you slip up and how you can fix it. Can you take medicines or have surgery to lose weight? Before your doctor will prescribe medicines or surgery, he or she will probably want you to be more active and follow your healthy eating plan for a period of time. These habits are key lifelong changes for managing your weight, with or without other medical treatment. And these changes can help you avoid weight-related health problems. Follow-up care is a key part of your treatment and safety. Be sure to make and go to all appointments, and call your doctor if you are having problems. It's also a good idea to know your test results and keep a list of the medicines you take. Where can you learn more? Go to http://anna-alistair.info/. Enter N111 in the search box to learn more about \"Learning About Obesity. \"  Current as of: February 16, 2016  Content Version: 11.1  © 0550-7674 [employer]. Care instructions adapted under license by [employer] (which disclaims liability or warranty for this information). If you have questions about a medical condition or this instruction, always ask your healthcare professional. [place] [address] any warranty or liability for your use of this information. Starting a Weight Loss Plan: Care Instructions  Your Care Instructions  If you are thinking about losing weight, it can be hard to know where to start. Your doctor can help you set up a weight loss plan that best meets your needs. You may want to take a class on nutrition or exercise, or join a weight loss support group.  If you have questions about how to make changes to your eating or exercise habits, ask your doctor about seeing a registered dietitian or an exercise specialist.  It can be a big challenge to lose weight. But you do not have to make huge changes at once. Make small changes, and stick with them. When those changes become habit, add a few more changes. If you do not think you are ready to make changes right now, try to pick a date in the future. Make an appointment to see your doctor to discuss whether the time is right for you to start a plan. Follow-up care is a key part of your treatment and safety. Be sure to make and go to all appointments, and call your doctor if you are having problems. Its also a good idea to know your test results and keep a list of the medicines you take. How can you care for yourself at home? · Set realistic goals. Many people expect to lose much more weight than is likely. A weight loss of 5% to 10% of your body weight may be enough to improve your health. · Get family and friends involved to provide support. Talk to them about why you are trying to lose weight, and ask them to help. They can help by participating in exercise and having meals with you, even if they may be eating something different. · Find what works best for you. If you do not have time or do not like to cook, a program that offers meal replacement bars or shakes may be better for you. Or if you like to prepare meals, finding a plan that includes daily menus and recipes may be best.  · Ask your doctor about other health professionals who can help you achieve your weight loss goals. ¨ A dietitian can help you make healthy changes in your diet. ¨ An exercise specialist or  can help you develop a safe and effective exercise program.  ¨ A counselor or psychiatrist can help you cope with issues such as depression, anxiety, or family problems that can make it hard to focus on weight loss. · Consider joining a support group for people who are trying to lose weight.  Your doctor can suggest groups in your area. Where can you learn more? Go to http://anna-alistair.info/. Enter K888 in the search box to learn more about \"Starting a Weight Loss Plan: Care Instructions. \"  Current as of: February 16, 2016  Content Version: 11.1  © 2433-9362 [employer]. Care instructions adapted under license by [email or web address] (which disclaims liability or warranty for this information). If you have questions about a medical condition or this instruction, always ask your healthcare professional. [address] any warranty or liability for your use of this information. WEIGHT LOSS PLAN    1. Read food labels and count calories. Goal 2951-8367 calories daily for women and 7254-3354 calories daily for men. Achieve this by decreasing caloric intake by 500 calories by weekly increments. NOTE:  1 pound = 3500 calories! Www.loseit. [employer]  Www.Recommindpal.[employer]  Www.[email or web address]  Www.6APT. gov  Weight watchers mobile    Calories needed to lose 1-2 pounds a week = 10 x your weight in pounds    2. Increase water intake. Per [facility], it is important to drink 1/2 your body weight in ounces of water daily. Decrease your water consumption 2-3 hours before bedtime to prevent sleep disturbance from frequent urination. 3.  Decrease sugary beverages. Each can or glass of soda increases your risk of obesity by 60%. Can lose 10 pounds in a month by avoiding any soda. 12 oz can of soda = 140 calories, 16 oz cup of sweet tea = 200 calories    16 oz orange juice = 200 calories, 10 oz apple juice = 150 calories   32 oz sports drink = 200 calories, 16 oz punch = 240 calories   3.5 oz alcohol = 100-150 calories     4. Avoid High Fructose Corn Syrup products. This ingredient makes products highly addictive! 5. Exercise 30 minutes daily 5 days weekly, minimally. If you burn 3500 calories that equals a pound!   Use a pedometer to count steps. Visit www. [employer] for a free pedometer and diet recommendations. Your maximum heart rate = 220 - your age    Never exercise at your maximum heart rate. See handout for target heart rate. 6.  Decrease carbohydrates (white bread, pasta, rice, potatoes, sweet foods and sweet drinks like soda, tea, coffee, juice and sports drinks). Increase fiber and protein. Goal:   calories daily of carbohydrates     Try CHROMIUM PICONATE 200 MG THREE TIMES DAILY,    to decrease Premenstrual carbohydrate cravings. 7.  Eat 3-5 small meals daily, include lots of protein (beans/legumes, nuts, lean meat, eggs) and green vegetables with each. (Breakfast, lunch, dinner with 2 healthy snacks)    8. Get proper rest 7-8 hours uninterrupted. When you get less than 6 hours, it triggers hunger by affecting your [place]:Leptin ratio and this results in weight gain. 9.  Watch your food portions. Green leafy vegetables should cover 1/2 of the plate, lean meat 1/4 of the plate, starchy vegetable 1/4 of the plate. Use smaller plates. 10.  Do not eat until you are full. Eat until you are no longer hungry. If you are not sure, try drinking a glass of water before getting your second serving of food. 11.  Do not weigh yourself daily. Wait until your next office visit. Use how you feel and  how your clothes fit as measurements of success. 12.  Address your spirituality to draw strength from above during your journey. Remember \"I am fearfully and wonderfully made. Marvelous in His eyes. \"    13. Set realistic, appropriate and achievable weight loss goals:     RECOMMENDED TARGET WEIGHT LOSS:  Initial weight loss of 5-10% of your initial body weight achieved over 6 months or a decrease of 2 BMI units. MINIMUM GOAL OF WEIGHT LOSS:  Reduce body weight and maintain a lower body weight. Prevent weight gain. RELATED WEBSITES:      Www.obesityaction. org  (and consider joining the Obesity Action Coalition for $25/year. [facility] mission is to elevated and empower those affected by obesity through education, advocacy and support. Quarterly journals included in membership fee. )    Www.AmigoCAT. "[employer]"  www.WebSafety.com     RELATED DIETS:  Dr. Randi Cantu Weight loss challenge, Mediterranean diet, [address] [place] [address] Watchers, Paleo Diet (anti-inflammatory diet)        EXERCISE 150 MINUTES WEEKLY. THIS CAN BE ACHIEVED BY WORKING OUT OR WALKING A MINIMUM OF 30 MINUTES FOR 5 DAYS WEEKLY. YOU CAN EXERCISE IN INCREMENTS OF 10-15 MINUTES UP TO 3 TIMES A DAY. Consider performing \"brainless exercise. \"  Choose your favorite tv program.  Five minutes before and for 5 minutes after the tv program, stretch your body. While the program is on, walk in place watching the show. When commercials come on, rest or walk around the house to do other things. When the program begins, return to walking in place. When you are able keep walking during the commercials and add light weights to your ankles or hands. By the end of the show, you would have walked 30 minutes. If you need shorter spurts of exercise, walk during the commercials and rest during the show. Drink to glasses of water prior to any exercise to prevent dehydration and to improve the results of the work out. Your RESTING HEART RATE is the number of times your heart beats per minute when you are not exerting yourself. The more fit you are, the lower your resting heart rate will be. Your MAXIMUM HEART RATE is the number of times per minute your heart pumps when it is working at 100% capacity. NEVER EXERCISE AT YOUR MAXIMUM HEART RATE. MAX HEART RATE = 220 - your age    For example, in a 48year old, the maximum heart rate is 220-50 = 170 beats per minute    Your [place] is the number of beats per minute our heart should pump during aerobic exercise.   Reaching your target heart rate indicates that your body is receiving maximum cardiovascular and fat burning benefits. If you are fit, your TARGET HEART RATE = 70-85% of your maximum Heart rate      For a 48year old with vigorous intensity physical activity,         Target heart rate= 170 bpm x .70 = 119 bpm     Target heart rate = 170 bpm x .85=  145 bpm        Therefore, your target heart rate during physical activity is 119-145      If you are not fit, TARGET HEART RATE = 50-70% of your maximum Heart rate    MODERATE CONSISTENT AEROBIC EXERCISE OR WALKING FOR AT LEAST 150 MINUTES WEEKLY IS AN ESSENTIAL PART OF ANY WEIGHT LOSS OF WEIGHT MAINTENANCE PROGRAM.     During WEIGHT LOSS PHASE, at least 75% of your exercise needs to be walking or moderate aerobic activity and 25% or 0% can be Isometric or Resistance type exercises (the latter can be deferred to the weight maintenance phase.)     During WEIGHT MAINTENANCE PHASE, at least 50% of your exercise needs to be aerobic and 50% Isometric or Resistance type exercises. BENEFITS OF MODERATE INTENSITY EXERCISE MOST DAYS OF THE WEEK:     1. Insulin Resistance improves 30-85%   2. Abdominal Fat decreases by 30%   3. Inflammatory Markers decrease by 30% (therefore pain decreases)   4. Systolic and Diastolic Blood Pressure decrease by 4 mmHg   5. HDL improves by 5%   6. Triglycerides decrease by 15%   7. Shift from small dense LDL to large dense LDL (therefore decrease Insulin Resistance)   8. Decrease coagulability                  Starting a Weight Loss Plan: Care Instructions  Your Care Instructions  If you are thinking about losing weight, it can be hard to know where to start. Your doctor can help you set up a weight loss plan that best meets your needs. You may want to take a class on nutrition or exercise, or join a weight loss support group.  If you have questions about how to make changes to your eating or exercise habits, ask your doctor about seeing a registered dietitian or an exercise specialist.  It can be a big challenge to lose weight. But you do not have to make huge changes at once. Make small changes, and stick with them. When those changes become habit, add a few more changes. If you do not think you are ready to make changes right now, try to pick a date in the future. Make an appointment to see your doctor to discuss whether the time is right for you to start a plan. Follow-up care is a key part of your treatment and safety. Be sure to make and go to all appointments, and call your doctor if you are having problems. Its also a good idea to know your test results and keep a list of the medicines you take. How can you care for yourself at home? · Set realistic goals. Many people expect to lose much more weight than is likely. A weight loss of 5% to 10% of your body weight may be enough to improve your health. · Get family and friends involved to provide support. Talk to them about why you are trying to lose weight, and ask them to help. They can help by participating in exercise and having meals with you, even if they may be eating something different. · Find what works best for you. If you do not have time or do not like to cook, a program that offers meal replacement bars or shakes may be better for you. Or if you like to prepare meals, finding a plan that includes daily menus and recipes may be best.  · Ask your doctor about other health professionals who can help you achieve your weight loss goals. ¨ A dietitian can help you make healthy changes in your diet. ¨ An exercise specialist or  can help you develop a safe and effective exercise program.  ¨ A counselor or psychiatrist can help you cope with issues such as depression, anxiety, or family problems that can make it hard to focus on weight loss. · Consider joining a support group for people who are trying to lose weight. Your doctor can suggest groups in your area. Where can you learn more?   Go to http://anna-alistair.info/. Enter P028 in the search box to learn more about \"Starting a Weight Loss Plan: Care Instructions. \"  Current as of: February 16, 2016  Content Version: 11.1  © 1713-3287 [employer]. Care instructions adapted under license by [employer] (which disclaims liability or warranty for this information). If you have questions about a medical condition or this instruction, always ask your healthcare professional. [address] any warranty or liability for your use of this information.

## 2017-02-15 NOTE — MR AVS SNAPSHOT
Visit Information Date & Time Provider Department Dept. Phone Encounter #  
 2/15/2017  8:30 AM Natanael Gallegos DO [facility] [phone] [id number] Follow-up Instructions Return in about 1 month (around 3/15/2017) for weight, results . Upcoming Health Maintenance Date Due DTaP/Tdap/Td series (1 - Tdap) 3/23/2017* PAP AKA CERVICAL CYTOLOGY 3/23/2018 *Topic was postponed. The date shown is not the original due date. Allergies as of 2/15/2017  Review Complete On: 2/15/2017 By: Natanael Gallegos DO Severity Noted Reaction Type Reactions Latex Low 03/23/2015    Itching Other Medication High 02/15/2017    Rash [facility] Metformin  02/15/2017   Intolerance Diarrhea  
 severe Mirena [Levonorgestrel]  10/31/2013    Other (comments) Severe abdominal pain GALACTORRHEA Current Immunizations  Reviewed on 2/15/2017 Name Date Influenza Vaccine 10/31/2013 Reviewed by Natanael Gallegos DO on 2/15/2017 at  9:33 AM  
You Were Diagnosed With   
  
 Codes Comments Obesity, Class II, BMI 35-39.9 (HCC)    -  Primary ICD-10-CM: E66.01 
ICD-9-CM: 278.01 Hypothyroidism due to acquired atrophy of thyroid     ICD-10-CM: E03.4 ICD-9-CM: 244.8, 246.8 uncontrolled due to missed week of medication while son sick Pure hypercholesterolemia     ICD-10-CM: E78.00 ICD-9-CM: 272.0 Vitamin D deficiency     ICD-10-CM: E55.9 ICD-9-CM: 268.9 Hyperinsulinemia     ICD-10-CM: E16.1 ICD-9-CM: 251.1 Hyperglycemia     ICD-10-CM: R73.9 ICD-9-CM: 790.29 Family history of heart disease in male family member before age 54     ICD-10-CM: Z80.55 
ICD-9-CM: V17.49 Vitals BP Pulse Temp Resp Height(growth percentile) Weight(growth percentile) 108/77 (BP 1 Location: Left arm, BP Patient Position: Sitting) 84 98.5 °F (36.9 °C) (Oral) 18 5' 2.4\" (1.585 m) 210 lb 12.8 oz (95.6 kg) LMP SpO2 BMI OB Status Smoking Status 12/22/2016 (Exact Date) 98% 38.06 kg/m2 Having regular periods Never Smoker Vitals History BMI and BSA Data Body Mass Index Body Surface Area 38.06 kg/m 2 2.05 m 2 Preferred Pharmacy Pharmacy Name Phone [id number] [place] - [address] [phone] Your Updated Medication List  
  
   
This list is accurate as of: 2/15/17 10:09 AM.  Always use your most recent med list.  
  
  
  
  
 albuterol 90 mcg/actuation inhaler Commonly known as:  PROVENTIL HFA, VENTOLIN HFA, PROAIR HFA Take 2 Puffs by inhalation every four (4) hours as needed for Wheezing. Indications: BRONCHOSPASM PREVENTION  
  
 ergocalciferol 50,000 unit capsule Commonly known as:  ERGOCALCIFEROL Take 1 Cap by mouth every seven (7) days for 60 days. For low vit D. Take for 2 months then start over the counter vit D 1000 units daily. Indications: VITAMIN D DEFICIENCY  
  
 levothyroxine 137 mcg tablet Commonly known as:  SYNTHROID  
TAKE ONE TABLET BY MOUTH DAILY BEFORE BREAKFAST Prescriptions Printed Refills  
 ergocalciferol (ERGOCALCIFEROL) 50,000 unit capsule 2 Sig: Take 1 Cap by mouth every seven (7) days for 60 days. For low vit D. Take for 2 months then start over the counter vit D 1000 units daily. Indications: VITAMIN D DEFICIENCY Class: Print Route: Oral  
  
We Performed the Following AMB POC EKG ROUTINE W/ 12 LEADS, INTER & REP [81175 CPT(R)] Follow-up Instructions Return in about 1 month (around 3/15/2017) for weight, results . Patient Instructions Learning About Healthy Weight What is a healthy weight? A healthy weight is the weight at which you feel good about yourself and have energy for work and play. It's also one that lowers your risk for health problems. What can you do to stay at a healthy weight? It can be hard to stay at a healthy weight, especially when fast food, vending-machine snacks, and processed foods are so easy to find. And with your busy lifestyle, activity may be low on your list of things to do. But staying at a healthy weight may be easier than you think. Here are some dos and don'ts for staying at a healthy weight: 
Do eat healthy foods The kinds of foods you eat have a big impact on both your weight and your health. Reaching and staying at a healthy weight is not about going on a diet. It's about making healthier food choices every day and changing your diet for good. Healthy eating means eating a variety of foods so that you get all the nutrients you need. Your body needs protein, carbohydrate, and fats for energy. They keep your heart beating, your brain active, and your muscles working. On most days, try to eat from each food group. This means eating a variety of: · Whole grains, such as whole wheat breads and pastas. · Fruits and vegetables. · Dairy products, such as low-fat milk, yogurt, and cheese. · Lean proteins, such as all types of fish, chicken without the skin, and beans. Don't have too much or too little of one thing. All foods, if eaten in moderation, can be part of healthy eating. Even sweets can be okay. If your favorite foods are high in fat, salt, sugar, or calories, limit how often you eat them. Eat smaller servings, or look for healthy substitutes. Do watch what you eat Many people eat more than their bodies need. Part of staying at a healthy weight means learning how much food you really need from day to day and not eating more than that. Even with healthy foods, eating too much can make you gain weight. Having a well-balanced diet means that you eat enough, but not too much, and that your food gives you the nutrients you need to stay healthy. So listen to your body. Eat when you're hungry. Stop when you feel satisfied. It's a good idea to have healthy snacks ready for when you get hungry. Keep healthy snacks with you at work, in your car, and at home. If you have a healthy snack easily available, you'll be less likely to pick a candy bar or bag of chips from a vending machine instead. Some healthy snacks you might want to keep on hand are fruit, low-fat yogurt, string cheese, low-fat microwave popcorn, raisins and other dried fruit, nuts, whole wheat crackers, pretzels, carrots, celery sticks, and broccoli. Do some physical activity A big part of reaching and staying at a healthy weight is being active. When you're active, you burn calories. This makes it easier to reach and stay at a healthy weight. When you're active on a regular basis, your body burns more calories, even when you're at rest. Being active helps you lose fat and build lean muscle. Try to be active for at least 1 hour every day. This may sound like a lot, but it's okay to be active in smaller blocks of time that add up to 1 hour a day. Any activity that makes your heart beat faster and keeps it there for a while counts. A brisk walk, run, or swim will get your heart beating faster. So will climbing stairs, shooting baskets, or cycling. Even some household chores like vacuuming and mowing the lawn will get your heart rate up. Pick activities that you enjoyones that make your heart beat faster, your muscles stronger, and your muscles and joints more flexible. If you find more than one thing you like doing, do them all. You don't have to do the same thing every day. Don't diet Diets don't work. Diets are temporary. Because you give up so much when you diet, you may be hungry and think about food all the time. And after you stop dieting, you also may overeat to make up for what you missed. Most people who diet end up gaining back the pounds they lostand more. Remember that healthy bodies come in lots of shapes and sizes.  Everyone can get healthier by eating better and being more active. Where can you learn more? Go to http://anna-alistair.info/. Enter 201 [id number] in the search box to learn more about \"Learning About Healthy Weight. \" Current as of: February 16, 2016 Content Version: 11.1 © 0684-7618 [employer]. Care instructions adapted under license by [employer] (which disclaims liability or warranty for this information). If you have questions about a medical condition or this instruction, always ask your healthcare professional. [address] any warranty or liability for your use of this information. Learning About Obesity What is obesity? Obesity means having so much body fat that your health is in danger. Having too much body fat can lead to type 2 diabetes, heart disease, high blood pressure, arthritis, sleep apnea, and stroke. Even if you don't feel bad now, think about these health risks. Do they seem like a good reason to start on a new path toward a healthier weight? Or do you have another personal, powerful reason for wanting to lose weight? Whatever it is, keep it in mind. It can be hard to change eating habits and exercise habits. But with your own reason and plan, you can do it. How do you know if your weight is in the obesity range? To know if your weight is in the obesity range, your doctor looks at your body mass index (BMI) and waist size. Your BMI is a number that is calculated from your weight and your height. To figure your BMI for yourself, get a BMI table from your doctor or use an online tool, such as http://www.schmidt.com/ on the [employer] of L-3 BMdr. What causes obesity? When you take in more calories than you burn off, you gain weight. How you eat, how active you are, and other things affect how your body uses calories and whether you gain weight. If you have family members who have too much body fat, you may have inherited a tendency to gain weight. And your family also helps form your eating and lifestyle habits, which can lead to obesity. Also, our busy lives make it harder to plan and cook healthy meals. For many of us, it's easier to reach for prepared foods, go out to eat, or go to the drive-through. But these foods are often high in saturated fat and calories. Portions are often too large. What can you do to reach a healthy weight? Focus on health, not diets. Diets are hard to stay on and don't work in the long run. It is very hard to stay with a diet that includes lots of big changes in your eating habits. Instead of a diet, focus on lifestyle changes that will improve your health and achieve the right balance of energy and calories. To lose weight, you need to burn more calories than you take in. You can do it by eating healthy foods in reasonable amounts and becoming more active, even a little bit every day. Making small changes over time can add up to a lot. Make a plan for change. Many people have found that naming their reasons for change and staying focused on their plan can make a big difference. Work with your doctor to create a plan that is right for you. · Ask yourself: Yudy Shoulders are my personal, most powerful reasons for wanting this change? What will my life look like when I've made the change? \" · Set your long-term goal. Make it specific, such as \"I will lose x pounds. \" 
· Break your long-term goal into smaller, short-term goals. Make these small steps specific and within your reach, things you know you can do. These steps are what keep you going from day to day. How can you stay on your plan for change? Be ready. Choose to start during a time when there are few events that might trigger slip-ups, like holidays, social events, and high-stress periods. Decide on your first few steps.  Most people have more success when they make small changes, one step at a time. For example, you might switch a daily candy bar to a piece of fruit, walk 10 minutes more, or add more vegetables to a meal. 
Line up your support people. Make sure you're not going to be alone as you make this change. Connect with people who understand how important it is to you. Ask family members and friends for help in keeping with your plan. And think about who could make it harder for you, and how to handle them. Try tracking. People who keep track of what they eat, feel, and do are better at losing weight. Try writing down things like: · What and how much you eat. · How you feel before and after each meal. 
· Details about each meal (like eating out or at home, eating alone, or with friends or family). · What you do to be active. Look and plan. As you track, look for patterns that you may want to change. Take note of: · When you eat and whether you skip meals. · How often you eat out. · How many fruits and vegetables you eat. · When you eat beyond feeling full. · When and why you eat for reasons other than being hungry. When you stray from your plan, don't get upset. Figure out what made you slip up and how you can fix it. Can you take medicines or have surgery to lose weight? Before your doctor will prescribe medicines or surgery, he or she will probably want you to be more active and follow your healthy eating plan for a period of time. These habits are key lifelong changes for managing your weight, with or without other medical treatment. And these changes can help you avoid weight-related health problems. Follow-up care is a key part of your treatment and safety. Be sure to make and go to all appointments, and call your doctor if you are having problems. It's also a good idea to know your test results and keep a list of the medicines you take. Where can you learn more? Go to http://anna-alistair.info/. Enter N111 in the search box to learn more about \"Learning About Obesity. \" Current as of: February 16, 2016 Content Version: 11.1 © 1623-4475 [employer]. Care instructions adapted under license by [employer] (which disclaims liability or warranty for this information). If you have questions about a medical condition or this instruction, always ask your healthcare professional. [facility] [address] any warranty or liability for your use of this information. Starting a Weight Loss Plan: Care Instructions Your Care Instructions If you are thinking about losing weight, it can be hard to know where to start. Your doctor can help you set up a weight loss plan that best meets your needs. You may want to take a class on nutrition or exercise, or join a weight loss support group. If you have questions about how to make changes to your eating or exercise habits, ask your doctor about seeing a registered dietitian or an exercise specialist. 
It can be a big challenge to lose weight. But you do not have to make huge changes at once. Make small changes, and stick with them. When those changes become habit, add a few more changes. If you do not think you are ready to make changes right now, try to pick a date in the future. Make an appointment to see your doctor to discuss whether the time is right for you to start a plan. Follow-up care is a key part of your treatment and safety. Be sure to make and go to all appointments, and call your doctor if you are having problems. Its also a good idea to know your test results and keep a list of the medicines you take. How can you care for yourself at home? · Set realistic goals. Many people expect to lose much more weight than is likely. A weight loss of 5% to 10% of your body weight may be enough to improve your health. · Get family and friends involved to provide support.  Talk to them about why you are trying to lose weight, and ask them to help. They can help by participating in exercise and having meals with you, even if they may be eating something different. · Find what works best for you. If you do not have time or do not like to cook, a program that offers meal replacement bars or shakes may be better for you. Or if you like to prepare meals, finding a plan that includes daily menus and recipes may be best. 
· Ask your doctor about other health professionals who can help you achieve your weight loss goals. ¨ A dietitian can help you make healthy changes in your diet. ¨ An exercise specialist or  can help you develop a safe and effective exercise program. 
¨ A counselor or psychiatrist can help you cope with issues such as depression, anxiety, or family problems that can make it hard to focus on weight loss. · Consider joining a support group for people who are trying to lose weight. Your doctor can suggest groups in your area. Where can you learn more? Go to http://anna-alistair.info/. Enter W013 in the search box to learn more about \"Starting a Weight Loss Plan: Care Instructions. \" Current as of: February 16, 2016 Content Version: 11.1 © 7790-3815 [employer], [employer]. Care instructions adapted under license by [employer] (which disclaims liability or warranty for this information). If you have questions about a medical condition or this instruction, always ask your healthcare professional. [place] [address] any warranty or liability for your use of this information. WEIGHT LOSS PLAN 1. Read food labels and count calories. Goal 6179-0594 calories daily for women and 5281-7239 calories daily for men. Achieve this by decreasing caloric intake by 500 calories by weekly increments. NOTE:  1 pound = 3500 calories! Www.loseit. com Www.myfitnesspal.com Www.CPA Exchange. [employer] Www.healthToushay - It's what's in storeder. gov 
 Weight watchers mobile Calories needed to lose 1-2 pounds a week = 10 x your weight in pounds 2. Increase water intake. Per [facility], it is important to drink 1/2 your body weight in ounces of water daily. Decrease your water consumption 2-3 hours before bedtime to prevent sleep disturbance from frequent urination. 3.  Decrease sugary beverages. Each can or glass of soda increases your risk of obesity by 60%. Can lose 10 pounds in a month by avoiding any soda. 12 oz can of soda = 140 calories, 16 oz cup of sweet tea = 200 calories 16 oz orange juice = 200 calories, 10 oz apple juice = 150 calories 32 oz sports drink = 200 calories, 16 oz punch = 240 calories 3.5 oz alcohol = 100-150 calories 4. Avoid High Fructose Corn Syrup products. This ingredient makes products highly addictive! 5. Exercise 30 minutes daily 5 days weekly, minimally. If you burn 3500 calories that equals a pound! Use a pedometer to count steps. Visit www. [employer] for a free pedometer and diet recommendations. Your maximum heart rate = 220 - your age Never exercise at your maximum heart rate. See handout for target heart rate. 6.  Decrease carbohydrates (white bread, pasta, rice, potatoes, sweet foods and sweet drinks like soda, tea, coffee, juice and sports drinks). Increase fiber and protein. Goal:   calories daily of carbohydrates Try CHROMIUM PICONATE 200 MG THREE TIMES DAILY,  
 to decrease Premenstrual carbohydrate cravings. 7.  Eat 3-5 small meals daily, include lots of protein (beans/legumes, nuts, lean meat, eggs) and green vegetables with each. (Breakfast, lunch, dinner with 2 healthy snacks) 8. Get proper rest 7-8 hours uninterrupted. When you get less than 6 hours, it triggers hunger by affecting your Grehlin:Leptin ratio and this results in weight gain. 9.  Watch your food portions. Green leafy vegetables should cover 1/2 of the plate, lean meat 1/4 of the plate, starchy vegetable 1/4 of the plate. Use smaller plates. 10.  Do not eat until you are full. Eat until you are no longer hungry. If you are not sure, try drinking a glass of water before getting your second serving of food. 11.  Do not weigh yourself daily. Wait until your next office visit. Use how you feel and  how your clothes fit as measurements of success. 12.  Address your spirituality to draw strength from above during your journey. Remember \"I am fearfully and wonderfully made. Marvelous in His eyes. \" 
 
13. Set realistic, appropriate and achievable weight loss goals: 
 
 RECOMMENDED TARGET WEIGHT LOSS:  Initial weight loss of 5-10% of your initial body weight achieved over 6 months or a decrease of 2 BMI units. MINIMUM GOAL OF WEIGHT LOSS:  Reduce body weight and maintain a lower body weight. Prevent weight gain. RELATED WEBSITES:     
Www.obesityaction. org 
(and consider joining the Obesity Action Coalition for $25/year. The [facility] mission is to elevated and empower those affected by obesity through education, advocacy and support. Quarterly journals included in membership fee. ) Www.SeMeAntoja.com 
www.[employer] RELATED DIETS:  Dr. Sonia Garcia Weight loss challenge, Mediterranean diet, Grayling Diet, IPextreme Watchers, Paleo Diet (anti-inflammatory diet) EXERCISE 150 MINUTES WEEKLY. THIS CAN BE ACHIEVED BY WORKING OUT OR WALKING A MINIMUM OF 30 MINUTES FOR 5 DAYS WEEKLY. YOU CAN EXERCISE IN INCREMENTS OF 10-15 MINUTES UP TO 3 TIMES A DAY. Consider performing \"brainless exercise. \"  Choose your favorite tv program.  Five minutes before and for 5 minutes after the tv program, stretch your body. While the program is on, walk in place watching the show.   When commercials come on, rest or walk around the house to do other things. When the program begins, return to walking in place. When you are able keep walking during the commercials and add light weights to your ankles or hands. By the end of the show, you would have walked 30 minutes. If you need shorter spurts of exercise, walk during the commercials and rest during the show. Drink to glasses of water prior to any exercise to prevent dehydration and to improve the results of the work out. Your RESTING HEART RATE is the number of times your heart beats per minute when you are not exerting yourself. The more fit you are, the lower your resting heart rate will be. Your MAXIMUM HEART RATE is the number of times per minute your heart pumps when it is working at 100% capacity. NEVER EXERCISE AT YOUR MAXIMUM HEART RATE. MAX HEART RATE = 220 - your age For example, in a 48year old, the maximum heart rate is 220-50 = 170 beats per minute Your TARGET HEART RATE is the number of beats per minute our heart should pump during aerobic exercise. Reaching your target heart rate indicates that your body is receiving maximum cardiovascular and fat burning benefits. If you are fit, your TARGET HEART RATE = 70-85% of your maximum Heart rate For a 48year old with vigorous intensity physical activity, Target heart rate= 170 bpm x .70 = 119 bpm 
   Target heart rate = 170 bpm x .85=  145 bpm 
 
    Therefore, your target heart rate during physical activity is 119-145 If you are not fit, TARGET HEART RATE = 50-70% of your maximum Heart rate MODERATE CONSISTENT AEROBIC EXERCISE OR WALKING FOR AT LEAST 150 MINUTES WEEKLY IS AN ESSENTIAL PART OF ANY WEIGHT LOSS OF WEIGHT MAINTENANCE PROGRAM. During WEIGHT LOSS PHASE, at least 75% of your exercise needs to be walking or moderate aerobic activity and 25% or 0% can be Isometric or Resistance type exercises (the latter can be deferred to the weight maintenance phase.) During WEIGHT MAINTENANCE PHASE, at least 50% of your exercise needs to be aerobic and 50% Isometric or Resistance type exercises. BENEFITS OF MODERATE INTENSITY EXERCISE MOST DAYS OF THE WEEK: 
 
 1. Insulin Resistance improves 30-85% 2. Abdominal Fat decreases by 30% 3. Inflammatory Markers decrease by 30% (therefore pain decreases) 4. Systolic and Diastolic Blood Pressure decrease by 4 mmHg 5. HDL improves by 5% 6. Triglycerides decrease by 15% 7. Shift from small dense LDL to large dense LDL (therefore decrease Insulin Resistance) 8. Decrease coagulability Starting a Weight Loss Plan: Care Instructions Your Care Instructions If you are thinking about losing weight, it can be hard to know where to start. Your doctor can help you set up a weight loss plan that best meets your needs. You may want to take a class on nutrition or exercise, or join a weight loss support group. If you have questions about how to make changes to your eating or exercise habits, ask your doctor about seeing a registered dietitian or an exercise specialist. 
It can be a big challenge to lose weight. But you do not have to make huge changes at once. Make small changes, and stick with them. When those changes become habit, add a few more changes. If you do not think you are ready to make changes right now, try to pick a date in the future. Make an appointment to see your doctor to discuss whether the time is right for you to start a plan. Follow-up care is a key part of your treatment and safety. Be sure to make and go to all appointments, and call your doctor if you are having problems. Its also a good idea to know your test results and keep a list of the medicines you take. How can you care for yourself at home? · Set realistic goals. Many people expect to lose much more weight than is likely. A weight loss of 5% to 10% of your body weight may be enough to improve your health. · Get family and friends involved to provide support. Talk to them about why you are trying to lose weight, and ask them to help. They can help by participating in exercise and having meals with you, even if they may be eating something different. · Find what works best for you. If you do not have time or do not like to cook, a program that offers meal replacement bars or shakes may be better for you. Or if you like to prepare meals, finding a plan that includes daily menus and recipes may be best. 
· Ask your doctor about other health professionals who can help you achieve your weight loss goals. ¨ A dietitian can help you make healthy changes in your diet. ¨ An exercise specialist or  can help you develop a safe and effective exercise program. 
¨ A counselor or psychiatrist can help you cope with issues such as depression, anxiety, or family problems that can make it hard to focus on weight loss. · Consider joining a support group for people who are trying to lose weight. Your doctor can suggest groups in your area. Where can you learn more? Go to http://anna-alistair.info/. Enter K761 in the search box to learn more about \"Starting a Weight Loss Plan: Care Instructions. \" Current as of: February 16, 2016 Content Version: 11.1 © 2218-0706 [employer]. Care instructions adapted under license by [employer] (which disclaims liability or warranty for this information). If you have questions about a medical condition or this instruction, always ask your healthcare professional. [place] [address] any warranty or liability for your use of this information. Introducing [facility]! Dear Janet Haskins: Thank you for requesting a HealthTap account. Our records indicate that you already have an active HealthTap account. You can access your account anytime at [email or web address] Did you know that you can access your hospital and ER discharge instructions at any time in Underground Cellar? You can also review all of your test results from your hospital stay or ER visit. Additional Information If you have questions, please visit the Frequently Asked Questions section of the Underground Cellar website at [email or web address] Remember, Underground Cellar is NOT to be used for urgent needs. For medical emergencies, dial 911. Now available from your iPhone and Android! Please provide this summary of care documentation to your next provider. Your primary care clinician is listed as Lata Souza. If you have any questions after today's visit, please call [phone].

## 2017-02-23 ENCOUNTER — CLINICAL SUPPORT (OUTPATIENT)
Dept: BARIATRICS/WEIGHT MGMT | Age: 32
End: 2017-02-23

## 2017-02-23 NOTE — PROGRESS NOTES
Progress Note: Weekly Education Class in the [facility] Weight Loss Program   Is there anything that you or the patient needs to let Dr Princess Collins know about? no  Over the past week, have you experienced any side-effects? YES, fatigue and lighteadedness    Jarad Leo is a 32 y.o. female who is enrolled in [facility] Weight Loss Program    Visit Vitals    /77 (BP 1 Location: Left arm, BP Patient Position: Sitting)    Pulse 93    Ht 5' 2.4\" (1.585 m)    Wt 209 lb 14.4 oz (95.2 kg)    LMP 12/22/2016 (Exact Date)    BMI 37.9 kg/m2     Weight Metrics 2/23/2017 2/15/2017 2/15/2017 11/28/2016 11/28/2016 9/27/2016 9/27/2016   Weight 209 lb 14.4 oz - 210 lb 12.8 oz - 200 lb 11.2 oz - 197 lb 9.6 oz   Neck Circ (inches) - 13.5 - - - - -   Waist Measure Inches 41 43 - 41.75 - 44.5 -   Body Fat % - 41.8 - 41.8 - 41.5 -   BMI 37.9 kg/m2 - 38.06 kg/m2 - 36.71 kg/m2 - 36.14 kg/m2         Have you received any other medical care this week?  no    Have you had any change in your medications since your last visit? no    Did you have any problems adhering to the program last week? no      Eating Habits Over Last Week:  Did you take in 64 oz of non-caloric fluids? YES    Did you consume your 4 meal replacements each day?  YES      Physical Activity Over the Past Week:    Aerobic exercise: 000min  Resistance exercise: 000 workouts / week

## 2017-02-24 VITALS
WEIGHT: 209.9 LBS | HEIGHT: 62 IN | DIASTOLIC BLOOD PRESSURE: 77 MMHG | SYSTOLIC BLOOD PRESSURE: 115 MMHG | HEART RATE: 93 BPM | BODY MASS INDEX: 38.63 KG/M2

## 2017-02-28 PROBLEM — R79.82 ELEVATED C-REACTIVE PROTEIN (CRP): Status: ACTIVE | Noted: 2017-02-28

## 2017-02-28 NOTE — PROGRESS NOTES
Reviewed nurse progress note for [facility] ([facility]) Weekly Education Class with patient. Patient is agreeable to continue current diet, care and follow up as planned. Documentation true and accepted by Yadiel Lorenzana.  Oneida Zafar.

## 2017-03-02 ENCOUNTER — CLINICAL SUPPORT (OUTPATIENT)
Dept: BARIATRICS/WEIGHT MGMT | Age: 32
End: 2017-03-02

## 2017-03-09 ENCOUNTER — DOCUMENTATION ONLY (OUTPATIENT)
Dept: BARIATRICS/WEIGHT MGMT | Age: 32
End: 2017-03-09

## 2017-03-09 NOTE — PROGRESS NOTES
Spoke with Patient today regarding enrollment in the Medically Supervised Weight Loss program. Patient decided to terminate enrollment at this time. Reason: patient called 3/1/17 to notify she was terminating enrollment at this time without leaving reason why. Returned patients call, patient never returned my call. All Future Appointments with [facility] physician have been cancelled. Future Appointments  Date Time Provider Shawanda Garcia   3/9/2017 4:00 PM [email or web address] RMP REKHA SCHED   3/15/2017 8:30 AM DO NEHA Molina REKHA SCHED       Patient was made aware of the following Attendance Policy and Discharge Actions listed in the Agreement Form:  During the Reducing Phases, Patients will be allowed to miss no more than two (2) sessions in four (4) months. These absences include excused as well as unexcused absences. Patients missing more than two (2) sessions within any four-month period without communicating with the staff will be automatically dismissed from the Medically Supervised Weight Loss program. Meaning:  - All future appointments (including physician appointments) will be automatically cancelled. - Patients will no longer be eligible to purchase New Direction products. Close medical supervision is essential.  - All weight loss pictures taken during enrollment will be deleted from our database (if applicable). - Patient will be removed from the Facebook group (if applicable).   - There may be a waiting period or Orientation repeat required for re-entry into the program.

## 2017-03-15 ENCOUNTER — OFFICE VISIT (OUTPATIENT)
Dept: FAMILY MEDICINE CLINIC | Age: 32
End: 2017-03-15

## 2017-03-15 VITALS
HEART RATE: 92 BPM | OXYGEN SATURATION: 97 % | WEIGHT: 209 LBS | SYSTOLIC BLOOD PRESSURE: 108 MMHG | BODY MASS INDEX: 38.46 KG/M2 | HEIGHT: 62 IN | DIASTOLIC BLOOD PRESSURE: 74 MMHG | TEMPERATURE: 99.1 F | RESPIRATION RATE: 16 BRPM

## 2017-03-15 DIAGNOSIS — R79.82 ELEVATED C-REACTIVE PROTEIN (CRP): ICD-10-CM

## 2017-03-15 DIAGNOSIS — R42 DIZZINESS: ICD-10-CM

## 2017-03-15 DIAGNOSIS — R68.89 FLU-LIKE SYMPTOMS: ICD-10-CM

## 2017-03-15 DIAGNOSIS — R53.83 OTHER FATIGUE: ICD-10-CM

## 2017-03-15 DIAGNOSIS — E55.9 VITAMIN D DEFICIENCY: ICD-10-CM

## 2017-03-15 DIAGNOSIS — K59.09 CHRONIC CONSTIPATION: ICD-10-CM

## 2017-03-15 DIAGNOSIS — E78.00 PURE HYPERCHOLESTEROLEMIA: ICD-10-CM

## 2017-03-15 DIAGNOSIS — E16.1 HYPERINSULINEMIA: ICD-10-CM

## 2017-03-15 DIAGNOSIS — E03.4 HYPOTHYROIDISM DUE TO ACQUIRED ATROPHY OF THYROID: ICD-10-CM

## 2017-03-15 RX ORDER — PHENTERMINE HYDROCHLORIDE 37.5 MG/1
37.5 TABLET ORAL
Qty: 30 TAB | Refills: 0 | Status: SHIPPED | OUTPATIENT
Start: 2017-03-15 | End: 2017-04-10 | Stop reason: SDUPTHER

## 2017-03-15 NOTE — PROGRESS NOTES
[facility] Medically Supervised   [facility] Loss Program   [facility]    FOLLOW UP PHYSICIAN VISIT    HISTORY OF PRESENT ILLNESS  Agree with nurse and registered dietician notes. Landy Forbes is a 32 y.o. female with Obesity Class II, Body mass index is 38.23 kg/(m^2). and associated health concerns presents for evaluation and treatment of weight management. Pt with hypercholesterolemia, hypothyroidism, Vit D deficiency, elevated CRP, and hyperinsulinemia. She has been participating in the [facility] Medically Supervised [facility] Loss Program using the [employer] New Direction Very Low Calorie Diet (VLCD, 800 kcal/day) since 2/21/17. Upon review of weekly classes, she reported some fatigue and lightheadedness. At the time of her weigh in on 2/23/17, she had only been using the product x2 days. Her first week, she did well but then became very exhausted during the second week. At that time, her son caught the flu and she was taking care of him. She increased her water intake thinking she was just dehydrated. She continued to feel tired so she ate a salad and felt better. She stopped the program on 3/2/17. She originally was aiming to participated for 1 month but only lasted x2 weeks due the reasons already mentioned. TSH was 9.22 on 2/1/17 because she had been off Synthroid x3 weeks. She currently takes Synthroid 137 mcg daily, tolerating well. She has been taking Rx Vit D with Synthroid on Sunday mornings. Vit D was 12 on 2/1/17. She is also caring for her mom, grandma, and granddaddy. She has been taking mom to her doctor appointments. She also finds this time of the year to be a barrier for her. Weight History  Start weight 210 lbs on 2/15/17. Current weight 209 lbs. Percent weight loss 0.48% and pounds lost: 1 lb. Waist measurement is 45\", increased from 43\". Neck circumference is 13.5\"; unchanged. Body fat percent is 39.7%, decreased from 41.8%.       Are you satisfied with your progress since the last ov? Yes- \"every little bit helps\"     Eating Habits  3 meals daily with 1 afternoon snack. Breakfast: Shake  Lunch: Pudding  Dinner: Chicken and broccoli soup, which she found to be very salty. Snacks: Jello or protein bar  Appetite well controlled? Yes- felt full and could not finish the full meal products sometimes. Since your last visit, have you experienced any complications? Shobha Ferguson was constipated so she bought TrueBiotics Daily ProBiotics Supplement and started drinking coffee with relief of constipation. Did you have any problems adhering to the program? Yes- caring for family members and feeling like her energy dropped. On Sunday, she started making her own protein shakes with [employer] [place] Based Protein and Greens tub chocolate containing 20 g of protein, 2 servings of greens, and 120 angelica.     Drinking Habits  How much water do you consume daily? 202.8 oz  (goal of 2 L or 67 oz daily, minimally)  How much caffeine do you drink daily? 8 oz cup of coffee with splash of creamer. How much alcohol do you drink daily and weekly? None. Do you consume any sugar-sweetened beverages (sodas, teas, juices, etc.)? None. Sleep Habits  She slept at least 9 hours on the second week of the program and still felt tired. Currently, she sleeps x6.5 hours. Exercise  Type: During second week, she walked 1 mile daily. How long: x1 hour. Enjoyment: She loves walking. Medication(s):  Are you taking any medications to control the appetite? No.   Have you received any other medical care since last ov? No.    Written by aakash Zamorano, as dictated by Dr. Santiago Ahn DO.    ROS    Pt denies hunger, cravings, lack of focus, headaches, dizziness, nausea, vomiting, diarrhea, indigestion, rapid heart rate, SOB, low blood sugar, feeling cold, hair loss, rash, fluid retention, leg aches, difficulty sleeping, irritability, mood swings, or other associated sxs.      Review of Systems negative except as noted above in HPI. ALLERGIES:    Allergies   Allergen Reactions    Latex Itching    Other Medication Rash     SPRINTEC    Metformin Diarrhea     severe    Mirena [Levonorgestrel] Other (comments)     Severe abdominal pain  GALACTORRHEA       CURRENT MEDICATIONS:    Outpatient Prescriptions Marked as Taking for the 3/15/17 encounter (Office Visit) with Chema Henderson DO   Medication Sig Dispense Refill    phentermine (ADIPEX-P) 37.5 mg tablet Take 1 Tab by mouth every morning. Max Daily Amount: 37.5 mg. Indications: WEIGHT LOSS MANAGEMENT FOR OBESE PATIENT (BMI >= 30) 30 Tab 0    ergocalciferol (ERGOCALCIFEROL) 50,000 unit capsule Take 1 Cap by mouth every seven (7) days for 60 days. For low vit D. Take for 2 months then start over the counter vit D 1000 units daily. Indications: VITAMIN D DEFICIENCY 5 Cap 2    levothyroxine (SYNTHROID) 137 mcg tablet TAKE ONE TABLET BY MOUTH DAILY BEFORE BREAKFAST 30 Tab 11       PAST MEDICAL HISTORY:    Past Medical History:   Diagnosis Date    Allergy, unspecified not elsewhere classified     Ankle sprain     bilaterally. 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 Dr. Marva Gonzalez, OB. Luis Alberto Cason, Genetic Counselor   Ruddy Mckeon.  Elevated liver enzymes     Heart palpitations 2016    Exertional.  Dr. Nazario Cadena. Negative Exercise Stress Test.    Hyperinsulinemia 2015    Hypothyroidism due to acquired atrophy of thyroid     Pure hypercholesterolemia 2013       PAST SURGICAL HISTORY:    Past Surgical History:   Procedure Laterality Date    HX  SECTION      HX DILATION AND CURETTAGE  2016    due to Cystic Hygroma/Terrell's Syndrome fetus. Dr. Hardik Smith   (placed),  (removed)    IUD. Dr. Lani Mckeon.  HX TONSIL AND ADENOIDECTOMY  childhood    HX WISDOM TEETH EXTRACTION  2016    All 4 removed. FAMILY HISTORY:    Family History   Problem Relation Age of Onset   [facility] Arthritis-osteo Mother     Hypertension Mother    [facility] Migraines Mother     Other Mother      IBS    Hypertension Maternal Uncle     Asthma Maternal Uncle     Hypertension Maternal Grandmother     Other Maternal Grandmother      diverticulosis    Arthritis-osteo Maternal Grandmother     Cancer Maternal Grandfather      prostate    Arthritis-osteo Maternal Grandfather     Other Maternal Aunt      uterine fibroids    Obesity Other        SOCIAL HISTORY:    Social History     Social History    Marital status: SINGLE     Spouse name: N/A    Number of children: N/A    Years of education: N/A     Social History Main Topics    Smoking status: Never Smoker    Smokeless tobacco: Never Used    Alcohol use 0.6 oz/week     1 Glasses of wine, 0 Standard drinks or equivalent per week      Comment: every 3 weeks    Drug use: No    Sexual activity: Yes     Partners: Male     Birth control/ protection: Condom     Other Topics Concern    None     Social History Narrative       IMMUNIZATIONS:    Immunization History   Administered Date(s) Administered    Influenza Vaccine 10/31/2013, 10/03/2016         PHYSICAL EXAMINATION    Vital Signs    Visit Vitals    /74 (BP 1 Location: Right arm, BP Patient Position: Sitting)    Pulse 92    Temp 99.1 °F (37.3 °C) (Oral)    Resp 16    Ht 5' 2\" (1.575 m)    Wt 209 lb (94.8 kg)    LMP 12/22/2016 (Exact Date)    SpO2 97%    BMI 38.23 kg/m2       Weight Metrics 3/15/2017 3/15/2017 2/23/2017 2/15/2017 2/15/2017 11/28/2016 11/28/2016   Weight - 209 lb 209 lb 14.4 oz - 210 lb 12.8 oz - 200 lb 11.2 oz   Neck Circ (inches) 13.5 - - 13.5 - - -   Waist Measure Inches 45 - 41 43 - 41.75 -   Body Fat % 39.7 - - 41.8 - 41.8 -   BMI - 38.23 kg/m2 37.9 kg/m2 - 38.06 kg/m2 - 36.71 kg/m2         General appearance - Well nourished. Well appearing. Well developed. No acute distress. Obese.    Head - Normocephalic. Atraumatic. Eyes - pupils equal and reactive. Extraocular eye movements intact. Sclera anicteric. Mildly injected sclera. Ears - Hearing is grossly normal bilaterally. Nose - normal and patent. No polyps noted. No erythema. No discharge. Mouth - mucous membranes with adequate moisture. Posterior pharynx normal with cobblestone appearance. No erythema, white exudate or obstruction. Neck - supple. Midline trachea. No carotid bruits noted bilaterally. No thyromegaly noted. Chest - clear to auscultation bilaterally anteriorly and posteriorly. No wheezes. No rales or rhonchi. Breath sounds are symmetrical bilaterally. Unlabored respirations. Heart - normal rate. Regular rhythm. Normal S1, S2. No murmur noted. No rubs, clicks or gallops noted. Abdomen - soft and distended. No masses or organomegaly. No rebound, rigidity or guarding. Bowel sounds normal x 4 quadrants. No tenderness noted. Neurological - awake, alert and oriented to person, place, and time and event. Cranial nerves II through XII intact. Clear speech. Muscle strength is +5/5 x 4 extremities. Sensation is intact to light touch bilaterally. Steady gait. Heme/Lymph - peripheral pulses normal x 4 extremities. No peripheral edema is noted. Musculoskeletal - Intact x 4 extremities. Full ROM x 4 extremities. No pain with movement. Back exam - normal range of motion. No pain on palpation of the spinous processes in the cervical, thoracic, lumbar, sacral regions. No CVA tenderness. No buffalo hump noted. Skin - no rashes, erythema, ecchymosis, lacerations, abrasions, suspicious moles noted. No skin tags or moles. No acanthosis nigricans noted in the axilla or neck. Psychological -   normal behavior, dress and thought processes. Good insight. Good eye contact. Normal affect. Appropriate mood. Normal speech.       DATA REVIEWED    Lab Results   Component Value Date/Time    TSH 9.22 02/01/2017 10:00 AM    TSH 0.482 08/16/2016 08:31 AM    T4, Free 1.85 11/21/2016 11:42 AM     Lab Results   Component Value Date/Time    Vitamin D 25-Hydroxy 12 02/01/2017 10:00 AM     Lab Results   Component Value Date/Time    Cholesterol, total 228 02/01/2017 10:00 AM    HDL Cholesterol 58 02/01/2017 10:00 AM    LDL, calculated 167 02/01/2017 10:00 AM    VLDL, calculated 14 05/27/2016 10:16 AM    Triglyceride 83 02/01/2017 10:00 AM     Lab Results   Component Value Date/Time    Hemoglobin A1c 6.1 05/27/2016 10:16 AM       ASSESSMENT and PLAN      ICD-10-CM ICD-9-CM    1. Obesity, Class II, BMI 35-39.9, with comorbidity (HCC) E66.01 278.01 phentermine (ADIPEX-P) 37.5 mg tablet   2. Hypothyroidism due to acquired atrophy of thyroid E03.4 244.8 TSH 3RD GENERATION     246.8 T4, FREE    uncontrolled off Synthroid x 1 week   3. Chronic constipation K59.09 564.00     worse on VLCD   4. Dizziness S69 293.7 METABOLIC PANEL, COMPREHENSIVE      MAGNESIUM    due to side effects of VLCD during week 2 vs Flu vs other, resolved   5. Other fatigue I27.74 230.01 METABOLIC PANEL, COMPREHENSIVE      TSH 3RD GENERATION      MAGNESIUM    due to ?low sodium vs ?low blood sugar vs flu vs other, resolved   6. Flu-like symptoms R68.89 780.99     due to mild Flu from exposure to son vs VLCD side effect vs other   7. Pure hypercholesterolemia E78.00 272.0    8. Vitamin D deficiency E55.9 268.9 VITAMIN D, 25 HYDROXY   9. Hyperinsulinemia E16.1 251.1    10. Elevated C-reactive protein (CRP) R79.82 790.95      Chart reviewed and updated. At this time, pt prefers to postpone re-starting VLCD until 6/1/17 once her son is out of school; staff message sent to [facility], RD. She also declines LCD at this time. Discussed the patient's BMI with her. The BMI follow up plan is as follows: I have counseled this patient on diet and exercise regimens. Addressed weight, diet and exercise with patient.   Decrease carbohydrates (white foods, sweet foods, sweet drinks and alcohol), increase green leafy vegetables and protein (lean meats and beans) with each meal.  Avoid fried foods. Do not skip meals. Increase water intake. Avoid sugar-sweetened beverages. Get 7-8 hours uninterrupted sleep nightly. Exercise prescription: A goal of 30 minutes physical activity daily is recommended for health benefit and at least 60 minutes daily to prevent weight regain. For weight loss, no less than 75% needs to be aerobic (i.e. Walking) and no more than 25% resistance exercising (i.e. Weight lifting). For weight maintenance phase, 50% aerobic and 50% resistance exercises. Emphasized importance of physical activity and reducing sedentary time. Sleep prescription: A goal of 7-8 hours of uninterrupted sleep is recommended to turn off the Grehlin hormone to be released from the stomach and triggers appetite while promoting weight gain. Proper rest turns on Leptin hormone to be released from white adipose tissue and promotes weight loss. Reviewed New Direct CD Weekly Education Class Note from 2/23/17. Additional relevant handouts given and discussed with patient today. Continue current medications and care. Start Adipex-P 37.5 mg daily, month 1 of 3. Advised pt not to take vitamins within 4hours of Synthroid. Start OTC Vitamin B12 1,000 mcg daily and OTC Fish Oil with EPA and -1,000 mg daily. Recommend OTC Magnesium 400 mg daily x1 week then every other day. Prescription given to patient during office visit today. Adipex-P 37.5 mg. Controlled Substance Agreement reviewed and signed. Recheck pertinent labs. Counseled patient on health concerns:  Strategies to overcome habits or challenges to approve or continue adherence, flu-like syndrome, Vit D deficiency, and thryoid. Stressors. Reminded females of reproductive age that with weight loss, they may become more fertile and recommend the use of condoms if pregnancy is not desired. Weight loss goal of 5-10% in 6-12 months has shown significant improvement in obesity and its health consequences. Immunizations noted. Advance Care Booklet given at office visit. Discussed with pt today. Declines honoring choices referral.    Offered empathy, support, legitimation, prayers, partnership to patient. Praised patient for progress. Follow-up Disposition:  Return in about 1 month (around 4/15/2017) for weight, bp check. Patient was offered a choice/choices in the treatment plan today. Patient expresses understanding of the plan and agrees with recommendations. Written by aakash Mendez, as dictated by Dr. Martita Gottlieb DO. Documentation True and Accepted by Jose Hermosillo. Elva Raya. Patient Instructions   Start OTC Vitamin B12 1,000 mcg daily and OTC Fish Oil with EPA and -1,000 mg daily. Recommend OTC Magnesium 400 mg daily x1 week then every other day.

## 2017-03-15 NOTE — ACP (ADVANCE CARE PLANNING)
Discussed ACP with patient. Gave pt an Right to [facility]. Patient prefers to read it on her own. Declines referral to Honoring Choices team at this time. Patient will bring document to her next office visit or attach it to her MyChart record.

## 2017-03-15 NOTE — PROGRESS NOTES
Chief Complaint   Patient presents with    Weight Management     MSWL Program 1 mo. f/u     1. Have you been to the ER, urgent care clinic since your last visit? Hospitalized since your last visit? No    2. Have you seen or consulted any other health care providers outside of the [facility] since your last visit? Include any pap smears or colon screening. No    In the event something were to happen to you and you were unable to speak on your behalf, do you have an Advance Directive/ Living Will in place stating your wishes? NO    If yes, do we have a copy on file NO    If no, would you like information:    PT offered and declined. Stated that she received information at last ov.      Body Weight: 209lb  Body Fat: 39.7%  Muscle Mass: 32.3%  Body H2O: 44.6%  BMR: 1608  BMI: 37.8%

## 2017-03-15 NOTE — MR AVS SNAPSHOT
Visit Information Date & Time Provider Department Dept. Phone Encounter #  
 3/15/2017  8:30 AM January Nergo DO [place] [phone] [id number] Follow-up Instructions Return in about 1 month (around 4/15/2017) for weight, bp check. Routing History Upcoming Health Maintenance Date Due DTaP/Tdap/Td series (1 - Tdap) 3/23/2017* PAP AKA CERVICAL CYTOLOGY 3/23/2018 *Topic was postponed. The date shown is not the original due date. Allergies as of 3/15/2017  Review Complete On: 3/15/2017 By: January Negro DO Severity Noted Reaction Type Reactions Latex Low 03/23/2015    Itching Other Medication High 02/15/2017    Rash [facility] Metformin  02/15/2017   Intolerance Diarrhea  
 severe Mirena [Levonorgestrel]  10/31/2013    Other (comments) Severe abdominal pain GALACTORRHEA Current Immunizations  Reviewed on 3/15/2017 Name Date Influenza Vaccine 10/3/2016, 10/31/2013 Reviewed by January Negro DO on 3/15/2017 at  9:49 AM  
 Reviewed by January Negro DO on 3/15/2017 at  9:49 AM  
You Were Diagnosed With   
  
 Codes Comments Obesity, Class II, BMI 35-39.9, with comorbidity (HCC)    -  Primary ICD-10-CM: E66.01 
ICD-9-CM: 278.01 Hypothyroidism due to acquired atrophy of thyroid     ICD-10-CM: E03.4 ICD-9-CM: 244.8, 246.8 uncontrolled off Synthroid x 1 week Chronic constipation     ICD-10-CM: K59.09 
ICD-9-CM: 564.00 worse on VLCD Dizziness     ICD-10-CM: J18 ICD-9-CM: 780.4 due to side effects of VLCD during week 2 vs Flu vs other, resolved Other fatigue     ICD-10-CM: R53.83 ICD-9-CM: 780.79 due to ?low sodium vs ?low blood sugar vs flu vs other, resolved Flu-like symptoms     ICD-10-CM: R68.89 ICD-9-CM: 780.99 due to mild Flu from exposure to son vs VLCD side effect vs other Pure hypercholesterolemia     ICD-10-CM: E78.00 ICD-9-CM: 272.0 Vitamin D deficiency     ICD-10-CM: E55.9 ICD-9-CM: 268.9 Hyperinsulinemia     ICD-10-CM: E16.1 ICD-9-CM: 251.1 Elevated C-reactive protein (CRP)     ICD-10-CM: K78.19 ICD-9-CM: 790.95 Vitals BP Pulse Temp Resp Height(growth percentile) Weight(growth percentile) 108/74 (BP 1 Location: Right arm, BP Patient Position: Sitting) 92 99.1 °F (37.3 °C) (Oral) 16 5' 2\" (1.575 m) 209 lb (94.8 kg) LMP SpO2 BMI OB Status Smoking Status 12/22/2016 (Exact Date) 97% 38.23 kg/m2 Having regular periods Never Smoker BMI and BSA Data Body Mass Index Body Surface Area  
 38.23 kg/m 2 2.04 m 2 Preferred Pharmacy Pharmacy Name Phone [id number] [address] [phone] Your Updated Medication List  
  
   
This list is accurate as of: 3/15/17  9:57 AM.  Always use your most recent med list.  
  
  
  
  
 albuterol 90 mcg/actuation inhaler Commonly known as:  PROVENTIL HFA, VENTOLIN HFA, PROAIR HFA Take 2 Puffs by inhalation every four (4) hours as needed for Wheezing. Indications: BRONCHOSPASM PREVENTION  
  
 ergocalciferol 50,000 unit capsule Commonly known as:  ERGOCALCIFEROL Take 1 Cap by mouth every seven (7) days for 60 days. For low vit D. Take for 2 months then start over the counter vit D 1000 units daily. Indications: VITAMIN D DEFICIENCY  
  
 levothyroxine 137 mcg tablet Commonly known as:  SYNTHROID  
TAKE ONE TABLET BY MOUTH DAILY BEFORE BREAKFAST phentermine 37.5 mg tablet Commonly known as:  ADIPEX-P Take 1 Tab by mouth every morning. Max Daily Amount: 37.5 mg. Indications: WEIGHT LOSS MANAGEMENT FOR OBESE PATIENT (BMI >= 30) Prescriptions Printed Refills  
 phentermine (ADIPEX-P) 37.5 mg tablet 0 Sig: Take 1 Tab by mouth every morning. Max Daily Amount: 37.5 mg. Indications: WEIGHT LOSS MANAGEMENT FOR OBESE PATIENT (BMI >= 30) Class: Print Route: Oral  
  
We Performed the Following MAGNESIUM [id number] CPT(R)] METABOLIC PANEL, COMPREHENSIVE [89338 CPT(R)] T4, FREE [id number] CPT(R)] TSH 3RD GENERATION [08982 CPT(R)] VITAMIN D, 25 HYDROXY [id number] CPT(R)] Follow-up Instructions Return in about 1 month (around 4/15/2017) for weight, bp check. Patient Instructions Start OTC Vitamin B12 1,000 mcg daily and OTC Fish Oil with EPA and -1,000 mg daily. Recommend OTC Magnesium 400 mg daily x1 week then every other day. Introducing [facility]! Dear Elham Adam: Thank you for requesting a [employer] account. Our records indicate that you already have an active [employer] account. You can access your account anytime at [email or web address] Did you know that you can access your hospital and ER discharge instructions at any time in [employer]? You can also review all of your test results from your hospital stay or ER visit. Additional Information If you have questions, please visit the Frequently Asked Questions section of the [employer] website at [email or web address] Remember, [employer] is NOT to be used for urgent needs. For medical emergencies, dial 911. Now available from your iPhone and Android! Please provide this summary of care documentation to your next provider. Your primary care clinician is listed as Alfred Slade. If you have any questions after today's visit, please call [phone].

## 2017-03-15 NOTE — PATIENT INSTRUCTIONS
Start OTC Vitamin B12 1,000 mcg daily and OTC Fish Oil with EPA and -1,000 mg daily. Recommend OTC Magnesium 400 mg daily x1 week then every other day.

## 2017-04-04 LAB
25(OH)D3+25(OH)D2 SERPL-MCNC: 20.3 NG/ML (ref 30–100)
ALBUMIN SERPL-MCNC: 4.4 G/DL (ref 3.5–5.5)
ALBUMIN/GLOB SERPL: 1.6 {RATIO} (ref 1.2–2.2)
ALP SERPL-CCNC: 67 IU/L (ref 39–117)
ALT SERPL-CCNC: 13 IU/L (ref 0–32)
AST SERPL-CCNC: 12 IU/L (ref 0–40)
BILIRUB SERPL-MCNC: 0.3 MG/DL (ref 0–1.2)
BUN SERPL-MCNC: 7 MG/DL (ref 6–20)
BUN/CREAT SERPL: 12 (ref 9–23)
CALCIUM SERPL-MCNC: 9.6 MG/DL (ref 8.7–10.2)
CHLORIDE SERPL-SCNC: 100 MMOL/L (ref 96–106)
CO2 SERPL-SCNC: 23 MMOL/L (ref 18–29)
CREAT SERPL-MCNC: 0.58 MG/DL (ref 0.57–1)
GLOBULIN SER CALC-MCNC: 2.7 G/DL (ref 1.5–4.5)
GLUCOSE SERPL-MCNC: 92 MG/DL (ref 65–99)
MAGNESIUM SERPL-MCNC: 2.1 MG/DL (ref 1.6–2.3)
POTASSIUM SERPL-SCNC: 4.6 MMOL/L (ref 3.5–5.2)
PROT SERPL-MCNC: 7.1 G/DL (ref 6–8.5)
SODIUM SERPL-SCNC: 140 MMOL/L (ref 134–144)
T4 FREE SERPL-MCNC: 1.95 NG/DL (ref 0.82–1.77)
TSH SERPL DL<=0.005 MIU/L-ACNC: 0.28 UIU/ML (ref 0.45–4.5)

## 2017-04-10 ENCOUNTER — OFFICE VISIT (OUTPATIENT)
Dept: FAMILY MEDICINE CLINIC | Age: 32
End: 2017-04-10

## 2017-04-10 VITALS
OXYGEN SATURATION: 97 % | BODY MASS INDEX: 38.2 KG/M2 | SYSTOLIC BLOOD PRESSURE: 111 MMHG | HEIGHT: 62 IN | WEIGHT: 207.6 LBS | HEART RATE: 83 BPM | RESPIRATION RATE: 18 BRPM | DIASTOLIC BLOOD PRESSURE: 73 MMHG | TEMPERATURE: 98.2 F

## 2017-04-10 DIAGNOSIS — R63.4 WEIGHT LOSS: ICD-10-CM

## 2017-04-10 DIAGNOSIS — E16.1 HYPERINSULINEMIA: ICD-10-CM

## 2017-04-10 DIAGNOSIS — E55.9 VITAMIN D DEFICIENCY: ICD-10-CM

## 2017-04-10 DIAGNOSIS — E03.4 HYPOTHYROIDISM DUE TO ACQUIRED ATROPHY OF THYROID: Primary | ICD-10-CM

## 2017-04-10 DIAGNOSIS — J30.1 SEASONAL ALLERGIC RHINITIS DUE TO POLLEN: ICD-10-CM

## 2017-04-10 DIAGNOSIS — R51.9 HEADACHE DISORDER: ICD-10-CM

## 2017-04-10 DIAGNOSIS — R73.9 HYPERGLYCEMIA: ICD-10-CM

## 2017-04-10 RX ORDER — ERGOCALCIFEROL 1.25 MG/1
50000 CAPSULE ORAL
Qty: 5 CAP | Refills: 2 | Status: SHIPPED | OUTPATIENT
Start: 2017-04-10 | End: 2017-06-09

## 2017-04-10 RX ORDER — PHENTERMINE HYDROCHLORIDE 37.5 MG/1
37.5 TABLET ORAL
Qty: 30 TAB | Refills: 0 | Status: SHIPPED | OUTPATIENT
Start: 2017-04-10 | End: 2017-05-15 | Stop reason: SDUPTHER

## 2017-04-10 RX ORDER — PHENDIMETRAZINE TARTRATE 105 MG/1
CAPSULE, EXTENDED RELEASE ORAL
Refills: 0 | COMMUNITY
Start: 2017-03-22 | End: 2017-04-10 | Stop reason: ALTCHOICE

## 2017-04-10 NOTE — PROGRESS NOTES
HISTORY OF PRESENT ILLNESS  Dani Ramos is a 32 y.o. female presents with Blood Pressure Check; Weight Management; Results; and Medication Refill      Agree with nurse note. She is tolerating Adipex-P 37.5 mg well, month 1 of 3. Last prescribed on 03/15/17. She has noticed an increase of energy and decreased appetite since starting the medication. She lost 2 pounds since the last visit. Percent body fat has increased from 39.7% to 42.9%, waist circumference measurement has changed from 45\" to 42.5\". Overall, patient is pleased and requests a refill of the medication today. Hyperglycemic pt with hyperinsulinemia and hypothyroidism presents to the office with a BP of 111/73. She requests her most recent labs from 04/03/17. Vit D was 20.3, up from 12. Magnesium was 2.1. CMP was WNL. TSH was 0.276, down from 9.22. Free T4 was 1.95. For her thyroid, she has been taking Synthroid 137 mcg daily, tolerating well. Pt with seasonal allergies. She is taking OTC liquid Zyrtec qhs but reports she has still been sneezing. She also had a frontal headache and believes she may have been dehydrated. Pt has not had an eye exam x3 years. She previously saw optometrist, Dr. Edda Sahu. Patient denies fatigue, sleep disturbance, chest pain, heart palpitations, nausea, dry mouth, constipation, signs of depression. Written by aakash Bland, as dictated by Dr. Adrianna Menard DO.  ROS    Review of Systems negative except as noted above in HPI.     ALLERGIES:    Allergies   Allergen Reactions    Latex Itching    Other Medication Rash     SPRINTEC    Metformin Diarrhea     severe    Mirena [Levonorgestrel] Other (comments)     Severe abdominal pain  GALACTORRHEA       CURRENT MEDICATIONS:    Outpatient Prescriptions Marked as Taking for the 4/10/17 encounter (Office Visit) with Emory Conn DO   Medication Sig Dispense Refill    phentermine (ADIPEX-P) 37.5 mg tablet Take 1 Tab by mouth every morning. Max Daily Amount: 37.5 mg. Indications: WEIGHT LOSS MANAGEMENT FOR OBESE PATIENT (BMI >= 30) 30 Tab 0    ergocalciferol (ERGOCALCIFEROL) 50,000 unit capsule Take 1 Cap by mouth every seven (7) days for 60 days. For low vit D. Take for 2 months then start over the counter vit D 1000 units daily. Indications: VITAMIN D DEFICIENCY 5 Cap 2    [] ergocalciferol (ERGOCALCIFEROL) 50,000 unit capsule Take 1 Cap by mouth every seven (7) days for 60 days. For low vit D. Take for 2 months then start over the counter vit D 1000 units daily. Indications: VITAMIN D DEFICIENCY 5 Cap 2    levothyroxine (SYNTHROID) 137 mcg tablet TAKE ONE TABLET BY MOUTH DAILY BEFORE BREAKFAST 30 Tab 11       PAST MEDICAL HISTORY:    Past Medical History:   Diagnosis Date    Allergy, unspecified not elsewhere classified     Ankle sprain     bilaterally. 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 Dr. Christiano Garza, OB. Corinna Galeano, Genetic Counselor   Disha Wells.  Elevated liver enzymes     Heart palpitations 2016    Exertional.  Dr. Monster Rudolph. Negative Exercise Stress Test.    Hyperinsulinemia 2015    Hypothyroidism due to acquired atrophy of thyroid     Pure hypercholesterolemia 2013       PAST SURGICAL HISTORY:    Past Surgical History:   Procedure Laterality Date    HX  SECTION      HX DILATION AND CURETTAGE  2016    due to Cystic Hygroma/Terrell's Syndrome fetus. Dr. Katherin Carolina   (placed),  (removed)    IUD. Dr. Drew Lamas.  HX TONSIL AND ADENOIDECTOMY  childhood    HX WISDOM TEETH EXTRACTION  2016    All 4 removed.        FAMILY HISTORY:    Family History   Problem Relation Age of Onset   [facility] Arthritis-osteo Mother     Hypertension Mother    [facility] Migraines Mother     Other Mother      IBS    Hypertension Maternal Uncle     Asthma Maternal Uncle     Hypertension Maternal Grandmother     Other Maternal Grandmother      diverticulosis    Arthritis-osteo Maternal Grandmother     Cancer Maternal Grandfather      prostate    Arthritis-osteo Maternal Grandfather     Other Maternal Aunt      uterine fibroids    Obesity Other        SOCIAL HISTORY:    Social History     Social History    Marital status: SINGLE     Spouse name: N/A    Number of children: N/A    Years of education: N/A     Social History Main Topics    Smoking status: Never Smoker    Smokeless tobacco: Never Used    Alcohol use 0.6 oz/week     1 Glasses of wine, 0 Standard drinks or equivalent per week      Comment: every 3 weeks    Drug use: No    Sexual activity: Yes     Partners: Male     Birth control/ protection: Condom     Other Topics Concern    None     Social History Narrative       IMMUNIZATIONS:    Immunization History   Administered Date(s) Administered    Influenza Vaccine 10/31/2013, 10/03/2016       PHYSICAL EXAMINATION    Vital Signs    Visit Vitals    /73 (BP 1 Location: Right arm, BP Patient Position: Sitting)    Pulse 83    Temp 98.2 °F (36.8 °C) (Oral)    Resp 18    Ht 5' 2.01\" (1.575 m)    Wt 207 lb 9.6 oz (94.2 kg)    LMP 03/19/2017 (Approximate)    SpO2 97%    BMI 37.96 kg/m2       Weight Metrics 4/10/2017 4/10/2017 3/15/2017 3/15/2017 2/23/2017 2/15/2017 2/15/2017   Weight - 207 lb 9.6 oz - 209 lb 209 lb 14.4 oz - 210 lb 12.8 oz   Neck Circ (inches) - - 13.5 - - 13.5 -   Waist Measure Inches 42.5 - 45 - 41 43 -   Body Fat % 42.9 - 39.7 - - 41.8 -   BMI - 37.96 kg/m2 - 38.23 kg/m2 37.9 kg/m2 - 38.06 kg/m2       General appearance - Well nourished. Well appearing. Well developed. No acute distress. Overweight. Head - Normocephalic. Atraumatic. Eyes - pupils equal and reactive, extraocular eye movements intact, sclera anicteric  Ears - Hearing is grossly normal bilaterally.       Nose - normal and patent, no erythema, discharge or polyps   Mouth - mucous membranes moist, pharynx normal with cobblestone appearance. No erythema, white exudate or obstruction. Neck - supple. Midline trachea. No carotid bruits are noted. No thyromegaly noted. Chest - clear to auscultation bilaterally anterriorly and posteriorly. No wheezes, rales or rhonchi. Breath sounds are symmetrical and unlabored bilaterally. Heart - normal rate. Regular rhythm, normal S1, S2. No murmur. No rubs, clicks or gallops noted. Abdomen - soft and distended. No masses or organomegaly. No rebound, rigidity or guarding. Bowel sounds normal x 4 quadrants. No tenderness noted. Neurological - awake, alert and oriented to person, place, and time and event. Cranial nerves II through XII intact. Muscle strength is +5/5 x 4 extremities. Sensation is intact to light touch bilaterally. Steady gait. Heme/Lymph - peripheral pulses normal x 4 extremities. No peripheral edema is noted. No cervical adenopathy noted. Skin - no rashes, erythema, ecchymosis, lacerations, abrasions, suspicious moles noted. No skin tags. No acanthosis nigricans noted in the axilla or neck. Psychological -   normal behavior, speech, dress and thought processes. Good insight. Good eye contact. Normal affect. Appropriate mood.       DATA REVIEWED    Lab Results   Component Value Date/Time    WBC 9.4 02/01/2017 10:00 AM    Hemoglobin (POC) 13.6 02/21/2011 03:39 PM    HGB 13.4 02/01/2017 10:00 AM    Hematocrit (POC) 40 02/21/2011 03:39 PM    HCT 41 02/01/2017 10:00 AM    PLATELET 806 11/54/0203 10:00 AM    MCV 79 02/01/2017 10:00 AM     Lab Results   Component Value Date/Time    Sodium 140 04/03/2017 08:15 AM    Potassium 4.6 04/03/2017 08:15 AM    Chloride 100 04/03/2017 08:15 AM    CO2 23 04/03/2017 08:15 AM    Anion gap 13 02/01/2017 10:00 AM    Glucose 92 04/03/2017 08:15 AM    BUN 7 04/03/2017 08:15 AM    Creatinine 0.58 04/03/2017 08:15 AM    BUN/Creatinine ratio 12 04/03/2017 08:15 AM    GFR est  04/03/2017 08:15 AM    GFR est non- 04/03/2017 08:15 AM    Calcium 9.6 04/03/2017 08:15 AM    Bilirubin, total 0.3 04/03/2017 08:15 AM    AST (SGOT) 12 04/03/2017 08:15 AM    Alk. phosphatase 67 04/03/2017 08:15 AM    Protein, total 7.1 04/03/2017 08:15 AM    Albumin 4.4 04/03/2017 08:15 AM    Globulin 3.9 05/31/2015 07:58 PM    A-G Ratio 1.6 04/03/2017 08:15 AM    ALT (SGPT) 13 04/03/2017 08:15 AM     Lab Results   Component Value Date/Time    Cholesterol, total 228 02/01/2017 10:00 AM    HDL Cholesterol 58 02/01/2017 10:00 AM    LDL, calculated 167 02/01/2017 10:00 AM    VLDL, calculated 14 05/27/2016 10:16 AM    Triglyceride 83 02/01/2017 10:00 AM     Lab Results   Component Value Date/Time    Vitamin D 25-Hydroxy 12 02/01/2017 10:00 AM    VITAMIN D, 25-HYDROXY 20.3 04/03/2017 08:15 AM       Lab Results   Component Value Date/Time    Hemoglobin A1c 6.1 05/27/2016 10:16 AM     Lab Results   Component Value Date/Time    TSH 0.276 04/03/2017 08:15 AM    TSH 9.22 02/01/2017 10:00 AM       ASSESSMENT and PLAN    ICD-10-CM ICD-9-CM    1. Hypothyroidism due to acquired atrophy of thyroid E03.4 244.8 TSH 3RD GENERATION     246.8 T4, FREE   2. Hyperglycemia R73.9 790.29    3. Seasonal allergic rhinitis due to pollen J30.1 477.0    4. Hyperinsulinemia E16.1 251.1    5. Headache disorder R51 784.0     frontal due to sinus congestion vs dehydration vs other   6. Vitamin D deficiency E55.9 268.9 ergocalciferol (ERGOCALCIFEROL) 50,000 unit capsule   7. Obesity, Class II, BMI 35-39.9, with comorbidity (HCC) E66.9 278.00 phentermine (ADIPEX-P) 37.5 mg tablet   8. Weight loss R63.4 783.21     2# due to Adipex and efforts       Continue current medications and care. Decrease to a 1/2 tablet on Sundays. Add OTC nasal steroid spray daily x2 weeks to Zyrtec. Start Rx Vitamin D 50,000IU weekly x3 months and then take OTC Vitamin D 5,000IU daily.    Prescriptions written and given to patient (Vit D 50,000IU and Adpiex-P 37.5 mg, month 2 of 3.) Medication side effects discussed. Recheck labs in 8 weeks. Discussed the patient's BMI with her. The BMI follow up plan is as follows: I have counseled this patient on diet and exercise regimens. Addressed weight, diet and exercise with patient. Diet recommendations:  Decrease carbohydrates (white foods including flour, white bread, white rice, white pasta, white potatoes; corn, sweet foods, sweet drinks and alcohol), increase green leafy vegetables and protein with each meal.  Avoid fried foods. Eat 3-5 small meals daily. Do not skip meals. Ok to use meal replacements up to twice daily with protein goal of 60 mg per meal.   Recommend OTC Premiere Protein bars or shakes. Increase water intake. Increase physical activity to 30 minutes daily for health benefit or 60 minutes daily to prevent weight regain, as tolerated. Physical Activity prescription:  A goal of 30 minutes physical activity daily is recommended for health benefit and at least 60 minutes daily to prevent weight regain. For weight loss, no less than 75% needs to be aerobic (i.e. Walking) and no more than 25% resistance exercising (i.e. Weight lifting). For weight maintenance phase, 50% aerobic and 50% resistance exercises. Sleep prescription:    A goal of 7-8 hours of uninterrupted sleep is recommended to turn off the Grehlin hormone to be released from the stomach and triggers appetite while promoting weight gain. Proper rest turns on Leptin hormone to be released from white adipose tissue and promotes weight loss. Counseled patient on: weight loss goals, emphasizing a 5-10% weight loss in 6-12 months and strategies. Allergies and hypothyroidism. Relevant handouts given and discussed with patient. Immunizations noted. Praised pt for weight loss efforts and progress. Patient was offered a choice/choices in the treatment plan today. Patient expresses understanding of the plan and agrees with recommendations.     Follow-up Disposition:  Return in about 1 month (around 5/10/2017) for weight, med refill, bp check . Written by aakash Lam, as dictated by Dr. Yudy Mccallum DO. Documentation True and Accepted by Gaby Worthy. Zuly Carson. Patient Instructions   Advise patient to start taking Over The Counter allergy medication (Allegra, Zyrtec, Claritin, Xyzal or Alavert) daily. If you have itchy, watery eyes you can try OTC Zaditor or Zyrtec Allergy Eye drops. If you have a stuffy nose, please try OTC Flonase, Flonase Sensimist, Rhinocort, or Nasacort AQ 2 squirts up each nostril once a day (adults) or 1 squirt up each nostril once a day (children). Use allergy free, dye free, fragrance free products on your body and hair. After being outdoors, brush hair vigorously, wash face and arms, rinse nostrils with a nasal saline spray and consider changing your clothing. Dust furniture frequently and wear a mask while doing it. Vacuum floors weekly. Remove stuffed animals or extra pillows from the bed. Clean bedding in hot water weekly. Sometimes allergies can be so severe that 2 nasal sprays and 2 pills may be needed to control symptoms.

## 2017-04-10 NOTE — MR AVS SNAPSHOT
Visit Information Date & Time Provider Department Dept. Phone Encounter #  
 4/10/2017  3:45 PM [facility]""[place] [phone] [id number] Follow-up Instructions Return in about 1 month (around 5/10/2017) for weight, med refill, bp check . Upcoming Health Maintenance Date Due  
 PAP AKA CERVICAL CYTOLOGY 3/23/2018 DTaP/Tdap/Td series (2 - Td) 4/10/2027 Allergies as of 4/10/2017  Review Complete On: 4/10/2017 By: Vladimir Code Severity Noted Reaction Type Reactions Latex Low 03/23/2015    Itching Other Medication High 02/15/2017    Rash [address] [facility] Metformin  02/15/2017   Intolerance Diarrhea  
 severe Mirena [Levonorgestrel]  10/31/2013    Other (comments) Severe abdominal pain GALACTORRHEA Current Immunizations  Reviewed on 3/15/2017 Name Date Influenza Vaccine 10/3/2016, 10/31/2013 Not reviewed this visit You Were Diagnosed With   
  
 Codes Comments Hypothyroidism due to acquired atrophy of thyroid    -  Primary ICD-10-CM: E03.4 ICD-9-CM: 244.8, 246.8 Hyperglycemia     ICD-10-CM: R73.9 ICD-9-CM: 790.29 Seasonal allergic rhinitis due to pollen     ICD-10-CM: J30.1 ICD-9-CM: 477.0 Hyperinsulinemia     ICD-10-CM: E16.1 ICD-9-CM: 251.1 Headache disorder     ICD-10-CM: Anneliese Dull ICD-9-CM: 784.0 frontal due to sinus congestion vs dehydration vs other Vitamin D deficiency     ICD-10-CM: E55.9 ICD-9-CM: 268.9 Obesity, Class II, BMI 35-39.9, with comorbidity (HCC)     ICD-10-CM: E66.9 ICD-9-CM: 278.00 Weight loss     ICD-10-CM: R63.4 ICD-9-CM: 783.21 2# due to Adipex and efforts Vitals BP Pulse Temp Resp Height(growth percentile) Weight(growth percentile) 111/73 (BP 1 Location: Right arm, BP Patient Position: Sitting) 83 98.2 °F (36.8 °C) (Oral) 18 5' 2.01\" (1.575 m) 207 lb 9.6 oz (94.2 kg) LMP SpO2 BMI OB Status Smoking Status 03/19/2017 (Approximate) 97% 37.96 kg/m2 Having regular periods Never Smoker Vitals History BMI and BSA Data Body Mass Index Body Surface Area  
 37.96 kg/m 2 2.03 m 2 Preferred Pharmacy Pharmacy Name Phone [id number]- [place] - [address] [phone] Your Updated Medication List  
  
   
This list is accurate as of: 4/10/17  5:57 PM.  Always use your most recent med list.  
  
  
  
  
 albuterol 90 mcg/actuation inhaler Commonly known as:  PROVENTIL HFA, VENTOLIN HFA, PROAIR HFA Take 2 Puffs by inhalation every four (4) hours as needed for Wheezing. Indications: BRONCHOSPASM PREVENTION * ergocalciferol 50,000 unit capsule Commonly known as:  ERGOCALCIFEROL Take 1 Cap by mouth every seven (7) days for 60 days. For low vit D. Take for 2 months then start over the counter vit D 1000 units daily. Indications: VITAMIN D DEFICIENCY * ergocalciferol 50,000 unit capsule Commonly known as:  ERGOCALCIFEROL Take 1 Cap by mouth every seven (7) days for 60 days. For low vit D. Take for 2 months then start over the counter vit D 1000 units daily. Indications: VITAMIN D DEFICIENCY  
  
 levothyroxine 137 mcg tablet Commonly known as:  SYNTHROID  
TAKE ONE TABLET BY MOUTH DAILY BEFORE BREAKFAST phentermine 37.5 mg tablet Commonly known as:  ADIPEX-P Take 1 Tab by mouth every morning. Max Daily Amount: 37.5 mg. Indications: WEIGHT LOSS MANAGEMENT FOR OBESE PATIENT (BMI >= 30) * Notice: This list has 2 medication(s) that are the same as other medications prescribed for you. Read the directions carefully, and ask your doctor or other care provider to review them with you. Prescriptions Printed Refills  
 phentermine (ADIPEX-P) 37.5 mg tablet 0 Sig: Take 1 Tab by mouth every morning.  Max Daily Amount: 37.5 mg. Indications: WEIGHT LOSS MANAGEMENT FOR OBESE PATIENT (BMI >= 30) Class: Print Route: Oral  
 ergocalciferol (ERGOCALCIFEROL) 50,000 unit capsule 2 Sig: Take 1 Cap by mouth every seven (7) days for 60 days. For low vit D. Take for 2 months then start over the counter vit D 1000 units daily. Indications: VITAMIN D DEFICIENCY Class: Print Route: Oral  
  
We Performed the Following T4, FREE [id number] CPT(R)] TSH 3RD GENERATION [76114 CPT(R)] Follow-up Instructions Return in about 1 month (around 5/10/2017) for weight, med refill, bp check . Patient Instructions Advise patient to start taking Over The Counter allergy medication (Allegra, Zyrtec, Claritin, Xyzal or Alavert) daily. If you have itchy, watery eyes you can try OTC Zaditor or Zyrtec Allergy Eye drops. If you have a stuffy nose, please try OTC Flonase, Flonase Sensimist, Rhinocort, or Nasacort AQ 2 squirts up each nostril once a day (adults) or 1 squirt up each nostril once a day (children). Use allergy free, dye free, fragrance free products on your body and hair. After being outdoors, brush hair vigorously, wash face and arms, rinse nostrils with a nasal saline spray and consider changing your clothing. Dust furniture frequently and wear a mask while doing it. Vacuum floors weekly. Remove stuffed animals or extra pillows from the bed. Clean bedding in hot water weekly. Sometimes allergies can be so severe that 2 nasal sprays and 2 pills may be needed to control symptoms. Introducing [facility]! Dear [facility]: Thank you for requesting a Cartavi account. Our records indicate that you already have an active Cartavi account. You can access your account anytime at [email or web address] Did you know that you can access your hospital and ER discharge instructions at any time in Cartavi? You can also review all of your test results from your hospital stay or ER visit. Additional Information If you have questions, please visit the Frequently Asked Questions section of the [email or web address] website at [email or web address] Remember, [email or web address] is NOT to be used for urgent needs. For medical emergencies, dial 911. Now available from your iPhone and Android! Please provide this summary of care documentation to your next provider. Your primary care clinician is listed as Lalo Lopez. If you have any questions after today's visit, please call [phone].

## 2017-04-10 NOTE — PATIENT INSTRUCTIONS
Advise patient to start taking Over The Counter allergy medication (Allegra, Zyrtec, Claritin, Xyzal or Alavert) daily. If you have itchy, watery eyes you can try OTC Zaditor or Zyrtec Allergy Eye drops. If you have a stuffy nose, please try OTC Flonase, Flonase Sensimist, Rhinocort, or Nasacort AQ 2 squirts up each nostril once a day (adults) or 1 squirt up each nostril once a day (children). Use allergy free, dye free, fragrance free products on your body and hair. After being outdoors, brush hair vigorously, wash face and arms, rinse nostrils with a nasal saline spray and consider changing your clothing. Dust furniture frequently and wear a mask while doing it. Vacuum floors weekly. Remove stuffed animals or extra pillows from the bed. Clean bedding in hot water weekly. Sometimes allergies can be so severe that 2 nasal sprays and 2 pills may be needed to control symptoms.

## 2017-04-10 NOTE — PROGRESS NOTES
Chief Complaint   Patient presents with    Blood Pressure Check    Weight Management     1. Have you been to the ER, urgent care clinic since your last visit? Hospitalized since your last visit? No    2. Have you seen or consulted any other health care providers outside of the [address] [place] [address] since your last visit? Include any pap smears or colon screening. No    In the event something were to happen to you and you were unable to speak on your behalf, do you have an Advance Directive/ Living Will in place stating your wishes? NO    If yes, do we have a copy on file NO    If no, would you like information:    Pt stated that she is currently working on it. Pt stated that she has no other concerns to talk to Dr. Renee Mao about today.

## 2017-05-09 RX ORDER — LEVOTHYROXINE SODIUM 137 UG/1
137 TABLET ORAL
Qty: 30 TAB | Refills: 11 | Status: SHIPPED | OUTPATIENT
Start: 2017-05-09 | End: 2017-08-15 | Stop reason: DRUGHIGH

## 2017-05-15 ENCOUNTER — OFFICE VISIT (OUTPATIENT)
Dept: FAMILY MEDICINE CLINIC | Age: 32
End: 2017-05-15

## 2017-05-15 VITALS
HEIGHT: 62 IN | TEMPERATURE: 98.5 F | DIASTOLIC BLOOD PRESSURE: 71 MMHG | WEIGHT: 207.9 LBS | RESPIRATION RATE: 16 BRPM | BODY MASS INDEX: 38.26 KG/M2 | OXYGEN SATURATION: 95 % | SYSTOLIC BLOOD PRESSURE: 115 MMHG | HEART RATE: 71 BPM

## 2017-05-15 DIAGNOSIS — E03.4 HYPOTHYROIDISM DUE TO ACQUIRED ATROPHY OF THYROID: Primary | ICD-10-CM

## 2017-05-15 DIAGNOSIS — M25.572 CHRONIC PAIN OF LEFT ANKLE: ICD-10-CM

## 2017-05-15 DIAGNOSIS — E55.9 VITAMIN D DEFICIENCY: ICD-10-CM

## 2017-05-15 DIAGNOSIS — G89.29 CHRONIC PAIN OF LEFT ANKLE: ICD-10-CM

## 2017-05-15 DIAGNOSIS — E16.1 HYPERINSULINEMIA: ICD-10-CM

## 2017-05-15 DIAGNOSIS — R73.9 HYPERGLYCEMIA: ICD-10-CM

## 2017-05-15 RX ORDER — PHENTERMINE HYDROCHLORIDE 37.5 MG/1
37.5 TABLET ORAL
Qty: 30 TAB | Refills: 0 | Status: SHIPPED | OUTPATIENT
Start: 2017-05-15 | End: 2017-06-19 | Stop reason: ALTCHOICE

## 2017-05-15 NOTE — PROGRESS NOTES
Chief Complaint   Patient presents with    Weight Management    Blood Pressure Check    Medication Refill     Adipex-P     1. Have you been to the ER, urgent care clinic since your last visit? Hospitalized since your last visit? No    2. Have you seen or consulted any other health care providers outside of the [address] [place] [address] since your last visit? Include any pap smears or colon screening. No    In the event something were to happen to you and you were unable to speak on your behalf, do you have an Advance Directive/ Living Will in place stating your wishes? NO    If yes, do we have a copy on file NO    If no, would you like information:   Pt started last week and has not finished it. Pt stated her job is having weight loss seminars on Thursday and would like to talk to Dr. Ariella Tidwell about it today.

## 2017-05-15 NOTE — PROGRESS NOTES
HISTORY OF PRESENT ILLNESS  Julio C Garcia is a 32 y.o. female presents with Weight Management; Blood Pressure Check; and Medication Refill (Adipex-P)      Agree with nurse note. She is tolerating Adipex-P 37.5 mg well, month 2 of 3. Last prescribed on 04/10/17. She has noticed an increase of energy and decreased appetite since starting the medication. She has changed her snacks from chips to a mixture of nuts including almonds, craisins, and pistachios. She no longer eats 1 meal/day, now eats 3 meals/day with 1-2 snacks. She drinks about 96 ounces of water daily. She walks x10 minutes a couple times per day. She plans to attend a bariatric surgery orientation and has questions today. She lost 0 pounds since the last visit. Percent body fat has decreased from 42.9% to 42.5%, waist circumference measurement has changed from 42.5\" to 46.5\". It is almost time to start her menstrual cycle. Overall, patient requests a refill of the medication today. Hyperinsulinemic, hyperglycemic pt with hypothyroidism and Vit D deficiency presents to the office with a BP of 115/71. She went 6 days without Synthroid and restarted it last Wednesday. She is taking Rx Vit D, tolerating well. She mentions L ankle pain and swelling off and on, which she attributes to recurrent sprains. No new trauma. No knee or hip pain. Patient denies fatigue, sleep disturbance, chest pain, heart palpitations, nausea, dry mouth, constipation, signs of depression. Written by aakash Silverman, as dictated by Dr. Jn Lezama DO.    MARIA G    Review of Systems negative except as noted above in HPI.     ALLERGIES:    Allergies   Allergen Reactions    Latex Itching    Other Medication Rash     SPRINTEC    Metformin Diarrhea     severe    Mirena [Levonorgestrel] Other (comments)     Severe abdominal pain  GALACTORRHEA       CURRENT MEDICATIONS:    Outpatient Prescriptions Marked as Taking for the 5/15/17 encounter (Office Visit) with Sierra Brizuela, DO   Medication Sig Dispense Refill    phentermine (ADIPEX-P) 37.5 mg tablet Take 1 Tab by mouth every morning. Max Daily Amount: 37.5 mg. Indications: WEIGHT LOSS MANAGEMENT FOR OBESE PATIENT (BMI >= 30) 30 Tab 0    levothyroxine (SYNTHROID) 137 mcg tablet Take 137 mcg by mouth Daily (before breakfast). Indications: hypothyroidism 30 Tab 11    ergocalciferol (ERGOCALCIFEROL) 50,000 unit capsule Take 1 Cap by mouth every seven (7) days for 60 days. For low vit D. Take for 2 months then start over the counter vit D 1000 units daily. Indications: VITAMIN D DEFICIENCY 5 Cap 2       PAST MEDICAL HISTORY:    Past Medical History:   Diagnosis Date    Allergy, unspecified not elsewhere classified     Ankle sprain     bilaterally. 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 Dr. Isatu Salinas, OB. Lewis Hernandez, Genetic Counselor   Rachid Rivera.  Elevated liver enzymes     Heart palpitations 2016    Exertional.  Dr. Radha Hoang. Negative Exercise Stress Test.    Hyperinsulinemia 2015    Hypothyroidism due to acquired atrophy of thyroid     Pure hypercholesterolemia 2013       PAST SURGICAL HISTORY:    Past Surgical History:   Procedure Laterality Date    HX  SECTION      HX DILATION AND CURETTAGE  2016    due to Cystic Hygroma/Terrell's Syndrome fetus. Dr. Jin Varghese   (placed),  (removed)    IUD. Dr. Debby Nino.  HX TONSIL AND ADENOIDECTOMY  childhood    HX WISDOM TEETH EXTRACTION  2016    All 4 removed.        FAMILY HISTORY:    Family History   Problem Relation Age of Onset   [place] Arthritis-osteo Mother     Hypertension Mother    [place] Migraines Mother     Other Mother      IBS    Hypertension Maternal Uncle     Asthma Maternal Uncle     Hypertension Maternal Grandmother     Other Maternal Grandmother      diverticulosis    Arthritis-osteo Maternal Grandmother     Cancer Maternal Grandfather      prostate    Arthritis-osteo Maternal Grandfather     Other Maternal Aunt      uterine fibroids    Obesity Other        SOCIAL HISTORY:    Social History     Social History    Marital status: SINGLE     Spouse name: N/A    Number of children: N/A    Years of education: N/A     Social History Main Topics    Smoking status: Never Smoker    Smokeless tobacco: Never Used    Alcohol use 0.6 oz/week     1 Glasses of wine, 0 Standard drinks or equivalent per week      Comment: every 3 weeks    Drug use: No    Sexual activity: Yes     Partners: Male     Birth control/ protection: Condom     Other Topics Concern    None     Social History Narrative       IMMUNIZATIONS:    Immunization History   Administered Date(s) Administered    Influenza Vaccine 10/31/2013, 10/03/2016       PHYSICAL EXAMINATION    Vital Signs    Visit Vitals    /71 (BP 1 Location: Right arm, BP Patient Position: Sitting)    Pulse 71    Temp 98.5 °F (36.9 °C) (Oral)    Resp 16    Ht 5' 2.01\" (1.575 m)    Wt 207 lb 14.4 oz (94.3 kg)    SpO2 95%    BMI 38.02 kg/m2       Weight Metrics 5/15/2017 5/15/2017 4/10/2017 4/10/2017 3/15/2017 3/15/2017 2/23/2017   Weight - 207 lb 14.4 oz - 207 lb 9.6 oz - 209 lb 209 lb 14.4 oz   Neck Circ (inches) - - - - 13.5 - -   Waist Measure Inches 46.5 - 42.5 - 45 - 41   Body Fat % 42.5 - 42.9 - 39.7 - -   BMI - 38.02 kg/m2 - 37.96 kg/m2 - 38.23 kg/m2 37.9 kg/m2       General appearance - Well nourished. Well appearing. Well developed. No acute distress. Overweight. Head - Normocephalic. Atraumatic. Eyes - pupils equal and reactive, extraocular eye movements intact, sclera anicteric  Ears - Hearing is grossly normal bilaterally. Nose - normal and patent, no erythema, discharge or polyps   Mouth - mucous membranes moist, pharynx normal with cobblestone appearance. No erythema, white exudate or obstruction. Neck - supple. Midline trachea. No carotid bruits are noted. No thyromegaly noted. Chest - clear to auscultation bilaterally anterriorly and posteriorly. No wheezes, rales or rhonchi. Breath sounds are symmetrical and unlabored bilaterally. Heart - normal rate. Regular rhythm, normal S1, S2. No murmur. No rubs, clicks or gallops noted. Abdomen - soft and distended. No masses or organomegaly. No rebound, rigidity or guarding. Bowel sounds normal x 4 quadrants. No tenderness noted. Neurological - awake, alert and oriented to person, place, and time and event. Cranial nerves II through XII intact. Muscle strength is +5/5 x 4 extremities. Sensation is intact to light touch bilaterally. Steady gait. Heme/Lymph - peripheral pulses normal x 4 extremities. No peripheral edema is noted. No cervical adenopathy noted. Musculoskeletal - Intact x 4. Mild L Ankle pain with movement. No tenderness in the pelvis, pubic bone, bilateral hips, knees, R ankle. Skin - no rashes, erythema, ecchymosis, lacerations, abrasions, suspicious moles noted. No skin tags. No acanthosis nigricans noted in the axilla or neck. Psychological -   normal behavior, speech, dress and thought processes. Good insight. Good eye contact. Normal affect. Appropriate mood.       DATA REVIEWED    Lab Results   Component Value Date/Time    WBC 9.4 02/01/2017 10:00 AM    Hemoglobin (POC) 13.6 02/21/2011 03:39 PM    HGB 13.4 02/01/2017 10:00 AM    Hematocrit (POC) 40 02/21/2011 03:39 PM    HCT 41 02/01/2017 10:00 AM    PLATELET 906 45/51/0419 10:00 AM    MCV 79 02/01/2017 10:00 AM     Lab Results   Component Value Date/Time    Sodium 140 04/03/2017 08:15 AM    Potassium 4.6 04/03/2017 08:15 AM    Chloride 100 04/03/2017 08:15 AM    CO2 23 04/03/2017 08:15 AM    Anion gap 13 02/01/2017 10:00 AM    Glucose 92 04/03/2017 08:15 AM    BUN 7 04/03/2017 08:15 AM    Creatinine 0.58 04/03/2017 08:15 AM    BUN/Creatinine ratio 12 04/03/2017 08:15 AM    GFR est  04/03/2017 08:15 AM    GFR est non- 04/03/2017 08:15 AM    Calcium 9.6 04/03/2017 08:15 AM    Bilirubin, total 0.3 04/03/2017 08:15 AM    AST (SGOT) 12 04/03/2017 08:15 AM    Alk. phosphatase 67 04/03/2017 08:15 AM    Protein, total 7.1 04/03/2017 08:15 AM    Albumin 4.4 04/03/2017 08:15 AM    Globulin 3.9 05/31/2015 07:58 PM    A-G Ratio 1.6 04/03/2017 08:15 AM    ALT (SGPT) 13 04/03/2017 08:15 AM     Lab Results   Component Value Date/Time    Cholesterol, total 228 02/01/2017 10:00 AM    HDL Cholesterol 58 02/01/2017 10:00 AM    LDL, calculated 167 02/01/2017 10:00 AM    VLDL, calculated 14 05/27/2016 10:16 AM    Triglyceride 83 02/01/2017 10:00 AM     Lab Results   Component Value Date/Time    Vitamin D 25-Hydroxy 12 02/01/2017 10:00 AM    VITAMIN D, 25-HYDROXY 20.3 04/03/2017 08:15 AM       Lab Results   Component Value Date/Time    Hemoglobin A1c 6.1 05/27/2016 10:16 AM     Lab Results   Component Value Date/Time    TSH 0.276 04/03/2017 08:15 AM    TSH 9.22 02/01/2017 10:00 AM       ASSESSMENT and PLAN    ICD-10-CM ICD-9-CM    1. Hypothyroidism due to acquired atrophy of thyroid E03.4 244.8      246.8    2. Hyperinsulinemia E16.1 251.1    3. Obesity, Class II, BMI 35-39.9, with comorbidity (HCC) E66.9 278.00 phentermine (ADIPEX-P) 37.5 mg tablet   4. Vitamin D deficiency E55.9 268.9    5. Hyperglycemia R73.9 790.29    6. Chronic pain of left ankle M25.572 719.47     G89.29 338.29     due to recurrent sprains       Continue current medications and care. Never miss thyroid medication. If missed, take it as soon as it is remembered. Avoid vitamins or heartburn meds within 4 hours of taking it. Prescriptions written and given to patient (Adipex-P 37.5 mg, month 3 of 3.)  Medication side effects discussed. Discussed the patient's BMI with her. The BMI follow up plan is as follows: I have counseled this patient on diet and exercise regimens. Addressed weight, diet and exercise with patient.     Diet recommendations:  Decrease carbohydrates (white foods including flour, white bread, white rice, white pasta, white potatoes; corn, sweet foods, sweet drinks and alcohol), increase green leafy vegetables and protein with each meal.  Avoid fried foods. Eat 3-5 small meals daily. Do not skip meals. Ok to use meal replacements up to twice daily with protein goal of 60 mg per meal.   Recommend OTC Premiere Protein bars or shakes. Increase water intake. Increase physical activity to 30 minutes daily for health benefit or 60 minutes daily to prevent weight regain, as tolerated. Physical Activity prescription:  A goal of 30 minutes physical activity daily is recommended for health benefit and at least 60 minutes daily to prevent weight regain. For weight loss, no less than 75% needs to be aerobic (i.e. Walking) and no more than 25% resistance exercising (i.e. Weight lifting). For weight maintenance phase, 50% aerobic and 50% resistance exercises. Sleep prescription:    A goal of 7-8 hours of uninterrupted sleep is recommended to turn off the Grehlin hormone to be released from the stomach and triggers appetite while promoting weight gain. Proper rest turns on Leptin hormone to be released from white adipose tissue and promotes weight loss. Counseled patient on: weight loss goals, emphasizing a 5-10% weight loss in 6-12 months and strategies. Ankle care/RICE therapy. Medication compliance. To ortho if symptoms worsen or persist.    Immunizations noted. Praised pt for weight loss efforts and progress. Patient was offered a choice/choices in the treatment plan today. Patient expresses understanding of the plan and agrees with recommendations. Patient declines any additional handouts. Patient is satisfied with previous handouts received from our office    Follow-up Disposition:  Return in about 1 month (around 6/15/2017) for weight, bp.     Written by aakash Yin, as dictated by Dr. Julien Farr Julien Gomez DO. Documentation True and Accepted by Ronny King.  Denise Lynch.

## 2017-05-15 NOTE — MR AVS SNAPSHOT
Visit Information Date & Time Provider Department Dept. Phone Encounter #  
 5/15/2017  8:30 AM Emory Conn DO [facility] [phone] Follow-up Instructions Return in about 1 month (around 6/15/2017) for weight, bp. Upcoming Health Maintenance Date Due INFLUENZA AGE 9 TO ADULT 8/1/2017 PAP AKA CERVICAL CYTOLOGY 3/23/2018 DTaP/Tdap/Td series (2 - Td) 4/10/2027 Allergies as of 5/15/2017  Review Complete On: 5/15/2017 By: Emory Conn DO Severity Noted Reaction Type Reactions Latex Low 03/23/2015    Itching Other Medication High 02/15/2017    Rash [facility] Metformin  02/15/2017   Intolerance Diarrhea  
 severe Mirena [Levonorgestrel]  10/31/2013    Other (comments) Severe abdominal pain GALACTORRHEA Current Immunizations  Reviewed on 5/15/2017 Name Date Influenza Vaccine 10/3/2016, 10/31/2013 Reviewed by Emory Conn DO on 5/15/2017 at  9:29 AM  
You Were Diagnosed With   
  
 Codes Comments Hypothyroidism due to acquired atrophy of thyroid    -  Primary ICD-10-CM: E03.4 ICD-9-CM: 244.8, 246.8 Hyperinsulinemia     ICD-10-CM: E16.1 ICD-9-CM: 251.1 Obesity, Class II, BMI 35-39.9, with comorbidity (HCC)     ICD-10-CM: E66.9 ICD-9-CM: 278.00 Vitamin D deficiency     ICD-10-CM: E55.9 ICD-9-CM: 268.9 Hyperglycemia     ICD-10-CM: R73.9 ICD-9-CM: 790.29 Chronic pain of left ankle     ICD-10-CM: M25.572, G89.29 ICD-9-CM: 719.47, 338.29 due to recurrent sprains Vitals BP Pulse Temp Resp Height(growth percentile) Weight(growth percentile) 115/71 (BP 1 Location: Right arm, BP Patient Position: Sitting) 71 98.5 °F (36.9 °C) (Oral) 16 5' 2.01\" (1.575 m) 207 lb 14.4 oz (94.3 kg) SpO2 BMI OB Status Smoking Status 95% 38.02 kg/m2 Having regular periods Never Smoker BMI and BSA Data Body Mass Index Body Surface Area 38.02 kg/m 2 2.03 m 2 Preferred Pharmacy Pharmacy Name Phone [id number]- [place] - [address] [phone] Your Updated Medication List  
  
   
This list is accurate as of: 5/15/17  9:34 AM.  Always use your most recent med list.  
  
  
  
  
 albuterol 90 mcg/actuation inhaler Commonly known as:  PROVENTIL HFA, VENTOLIN HFA, PROAIR HFA Take 2 Puffs by inhalation every four (4) hours as needed for Wheezing. Indications: BRONCHOSPASM PREVENTION  
  
 ergocalciferol 50,000 unit capsule Commonly known as:  ERGOCALCIFEROL Take 1 Cap by mouth every seven (7) days for 60 days. For low vit D. Take for 2 months then start over the counter vit D 1000 units daily. Indications: VITAMIN D DEFICIENCY  
  
 levothyroxine 137 mcg tablet Commonly known as:  SYNTHROID Take 137 mcg by mouth Daily (before breakfast). Indications: hypothyroidism  
  
 phentermine 37.5 mg tablet Commonly known as:  ADIPEX-P Take 1 Tab by mouth every morning. Max Daily Amount: 37.5 mg. Indications: WEIGHT LOSS MANAGEMENT FOR OBESE PATIENT (BMI >= 30) Prescriptions Printed Refills  
 phentermine (ADIPEX-P) 37.5 mg tablet 0 Sig: Take 1 Tab by mouth every morning. Max Daily Amount: 37.5 mg. Indications: WEIGHT LOSS MANAGEMENT FOR OBESE PATIENT (BMI >= 30) Class: Print Route: Oral  
  
Follow-up Instructions Return in about 1 month (around 6/15/2017) for weight, bp. Introducing [facility]! Dear Manolo Nolan: Thank you for requesting a Ghostery account. Our records indicate that you already have an active Ghostery account. You can access your account anytime at [email or web address] Did you know that you can access your hospital and ER discharge instructions at any time in Ghostery? You can also review all of your test results from your hospital stay or ER visit. Additional Information If you have questions, please visit the Frequently Asked Questions section of the [email or web address]hart website at [email or web address] Remember, [employer] is NOT to be used for urgent needs. For medical emergencies, dial 911. Now available from your iPhone and Android! Please provide this summary of care documentation to your next provider. Your primary care clinician is listed as Marina Mckeon. If you have any questions after today's visit, please call [phone].

## 2017-05-25 ENCOUNTER — OFFICE VISIT (OUTPATIENT)
Dept: SURGERY | Age: 32
End: 2017-05-25

## 2017-05-25 VITALS
HEIGHT: 63 IN | DIASTOLIC BLOOD PRESSURE: 70 MMHG | TEMPERATURE: 98.5 F | HEART RATE: 110 BPM | SYSTOLIC BLOOD PRESSURE: 124 MMHG | WEIGHT: 206 LBS | RESPIRATION RATE: 18 BRPM | BODY MASS INDEX: 36.5 KG/M2 | OXYGEN SATURATION: 98 %

## 2017-05-25 DIAGNOSIS — E66.01 MORBID OBESITY DUE TO EXCESS CALORIES (HCC): Primary | ICD-10-CM

## 2017-05-25 DIAGNOSIS — E78.00 PURE HYPERCHOLESTEROLEMIA: ICD-10-CM

## 2017-05-25 DIAGNOSIS — E03.4 HYPOTHYROIDISM DUE TO ACQUIRED ATROPHY OF THYROID: ICD-10-CM

## 2017-05-25 DIAGNOSIS — R73.03 PRE-DIABETES: ICD-10-CM

## 2017-05-25 RX ORDER — PHENDIMETRAZINE TARTRATE 105 MG/1
CAPSULE, EXTENDED RELEASE ORAL
COMMUNITY
End: 2017-06-28

## 2017-05-25 NOTE — PROGRESS NOTES
Pricilla Webb is a 32year old female that presents today for evaluation for weight loss surgery. Patient has been overweight for the over 10 years. Patient height is 5'3'', weight is 206, BMI is 36.49, and frame is medium at 5.5 cm. Neck circumference is 14 inches, waist is 45.5 inches, and hip circumference is 52 inches    Dietary Habits: Eating 3 meals daily. Snacking with train mix. Cooks at home with baked foods. Admits to eating large portions and eating a lot starchy foods. Beverage of choice is water. No exercise. Prior weight loss attempts: Patient has tried physician supervised diet, prescription diet pills, and numerous unsupervised diets; all with minimal weight loss. Co-Morbid Conditions/ Past Medical History/ Medications:  Problem List  Date Reviewed: 5/25/2017          Codes Class Noted    Pre-diabetes ICD-10-CM: R73.03  ICD-9-CM: 790.29  5/25/2017    Overview Signed 5/25/2017  2:23 PM by Roseann Patton NP     Previous on Metformin for treatment. Morbid obesity due to excess calories [facility]) ICD-10-CM: E66.01  ICD-9-CM: 278.01  2/28/2017    Overview Signed 5/25/2017  2:21 PM by Roseann Patton NP     Overweight for past 10 years. Highest adult weight was 220 pounds in 2010. Lowest adult weight was 170 pounds.               Elevated C-reactive protein (CRP) ICD-10-CM: R79.82  ICD-9-CM: 790.95  2/28/2017        Chronic constipation ICD-10-CM: K59.09  ICD-9-CM: 564.00  6/28/2016    Overview Signed 6/28/2016 10:45 AM by Jerrel Cockayne, DO     improving with oatmeal, flax seed and exercise             Hypothyroidism due to acquired atrophy of thyroid ICD-10-CM: E03.4  ICD-9-CM: 244.8, 246.8  5/22/2015        Hyperinsulinemia ICD-10-CM: E16.1  ICD-9-CM: 251.1  5/22/2015        Pure hypercholesterolemia ICD-10-CM: E78.00  ICD-9-CM: 272.0  5/22/2015    Overview Signed 5/25/2017  2:19 PM by Roseann Patton NP     No medication management             Vitamin D deficiency ICD-10-CM: E55.9  ICD-9-CM: 268.9  2014        Family history of heart disease in male family member before age 54 ICD-10-CM: Z80.55  ICD-9-CM: V17.49  2013        Hyperglycemia ICD-10-CM: R73.9  ICD-9-CM: 790.29  2013        Low back pain ICD-10-CM: M54.5  ICD-9-CM: 724.2  2013            Current Outpatient Prescriptions   Medication Sig    phendimetrazine tartrate 105 mg cpER Take  by mouth.  levothyroxine (SYNTHROID) 137 mcg tablet Take 137 mcg by mouth Daily (before breakfast). Indications: hypothyroidism    ergocalciferol (ERGOCALCIFEROL) 50,000 unit capsule Take 1 Cap by mouth every seven (7) days for 60 days. For low vit D. Take for 2 months then start over the counter vit D 1000 units daily. Indications: VITAMIN D DEFICIENCY    phentermine (ADIPEX-P) 37.5 mg tablet Take 1 Tab by mouth every morning. Max Daily Amount: 37.5 mg. Indications: WEIGHT LOSS MANAGEMENT FOR OBESE PATIENT (BMI >= 30)    albuterol (PROVENTIL HFA, VENTOLIN HFA) 90 mcg/actuation inhaler Take 2 Puffs by inhalation every four (4) hours as needed for Wheezing. Indications: BRONCHOSPASM PREVENTION     No current facility-administered medications for this visit. Surgical History/ Prior Hospitalizations/ Psychiatric Admissions:  Past Surgical History:   Procedure Laterality Date    HX  SECTION      HX DILATION AND CURETTAGE  2016    due to Cystic Hygroma/Terrell's Syndrome fetus. Dr. Tisha Arredondo   (placed),  (removed)    IUD. Dr. Doreen Magaña.  HX TONSIL AND ADENOIDECTOMY  childhood    HX WISDOM TEETH EXTRACTION  2016    All 4 removed. Pregnancy History and Complications:  3 Para 1    Primary Care and Other Pertinent Providers: Ovi Masters DO is primary care provider. Allergies:    Allergies   Allergen Reactions    Latex Itching    Other Medication Rash     SPRINTEC    Metformin Diarrhea     severe    Mirena [Levonorgestrel] Other (comments) Severe abdominal pain  GALACTORRHEA         Family History:   Family History   Problem Relation Age of Onset   Ann Guerrero Arthritis-osteo Mother     Hypertension Mother     Migraines Mother     Other Mother      IBS    Hypertension Maternal Uncle     Asthma Maternal Uncle     Hypertension Maternal Grandmother     Other Maternal Grandmother      diverticulosis    Arthritis-osteo Maternal Grandmother     Cancer Maternal Grandfather      prostate    Arthritis-osteo Maternal Grandfather     Other Maternal Aunt      uterine fibroids    Obesity Other           Social History:   Social History     Social History    Marital status: SINGLE     Spouse name: N/A    Number of children: 1    Years of education: N/A     Occupational History    Not on file. Social History Main Topics    Smoking status: Never Smoker    Smokeless tobacco: Never Used    Alcohol use 0.6 oz/week     1 Glasses of wine, 0 Standard drinks or equivalent per week      Comment: [place] - 1 every 2 weeks    Drug use: No    Sexual activity: Yes     Partners: Male     Birth control/ protection: Condom     Other Topics Concern    Not on file     Social History Narrative           Physical Examination: Blood Pressure today is 124/70. Patient is a pleasant black female  in no acute distress. Patient had adequate dentition. Skin is warm and dry. Chest is clear. Heart with regular rate and rhythmn. Abdomen is obese, soft, non-tender, non-distended, bowels sound present in all four quadrants, no masses palpated. Extremities warm to the touch without edema. Sleeve gastrectomy or vertical gastric resection involves a resection of the vast majority of the stomach usually done with a dilator pressed against the right half of the stomach of the lesser curvature.  One preserves the pyloric sphincter at the lower end of the stomach and then sequentially staples and divides all the way up to the gastroesophageal junction leaving a small rim of the stomach to the left better known as the angle of His. This procedure significantly reduces the amount that the stomach can hold while removing the part of the stomach that secretes the hormone grehlin and alters the speed of food emptying from the stomach. Once food leaves the stomach, the remainder of the intestinal tract is completely intact and there is no effect on the digestion or absorption of food nutrients and calories. The procedure is intermediate between the adjustable gastric band and the gastric bypass as far as weight loss, potential complications and resolution of comorbid diseases such as Type 2 diabetes, high blood pressure, sleep apnea, arthritic pain, cholesterol disorders to mention a few. The usual complications are that of any procedure which affects the ability of the stomach to accept food at any given time. Those are usually nausea and vomiting which either spontaneously resolve or require endoscopic dilatation. The most serious complication from this surgery is a leak from the staple line usually near the  gastroesophageal junction. The frequency of this serious complication is low and usually heals spontaneously although reoperation may be necessary. The other serious complications are those which can occur with any major surgery and include  Infection, bleeding, blood clots and/or cardiopulmonary problems. Patient meets criteria established by the NIH. Without weight reduction, co-morbidities will escalate as well as risk of early mortality. Recommendation is patient could be served with surgical weight reduction, the procedure of laparoscopic sleeve gastrectomy was discussed. I explained to the patient differences between laparoscopic and open gastric bypass, laparoscopic adjustable gastric banding, and sleeve gastrectomy procedures. Patient has attended one our informational meeting and has seen our educational materials. Patient desires to have surgery with Dr. Homer Flood.      I have reinforced without lifestyle change and behavior modification, they would not achieve his weight loss goals. I reviewed risks and complications associated with each procedure. Other recommendations include psychological evaluation, nutritional consultation. Patient informed that she is to complete 6 consecutive month physician supervised diet. I discussed with patient a diet high in protein, low-fat, low- sugar, limited carbohydrates, and discontinuing use of carbonated beverages. Also discussed physical activity and exercises. I have answered all questions, they wish to proceed.     CC: Keron Bishop DO    50 minutes was spent with patient, greater than 50% of time spent counseling

## 2017-05-25 NOTE — PROGRESS NOTES
1. Have you been to the ER, urgent care clinic since your last visit? Hospitalized since your last visit? No    2. Have you seen or consulted any other health care providers outside of the [address] [place] [address] since your last visit? Include any pap smears or colon screening. No     Laura Emmanuel  Body composition    female  32 y.o. Vitals:    05/25/17 1348   BP: 124/70   Pulse: (!) 110   Resp: 18   Temp: 98.5 °F (36.9 °C)   TempSrc: Oral   SpO2: 98%   Weight: 206 lb (93.4 kg)   Height: 5' 3\" (1.6 m)     Body mass index is 36.49 kg/(m^2). Vercharismada Chard Neck-  14 in. Waist- 45.5 in. Hips- 52 in.   Frame size- 5.5 cm - Small

## 2017-05-25 NOTE — PATIENT INSTRUCTIONS
Learning About Bariatric Surgery  What is bariatric surgery? Bariatric surgery is surgery to help you lose weight. This type of surgery is only used for people who are very overweight and have not been able to lose weight with diet and exercise. This surgery makes the stomach smaller. Some types of surgery also change the connection between your stomach and intestines. How is bariatric surgery done? Bariatric surgery may be either \"open\" or \"laparoscopic. \" Open surgery is done through a large cut (incision) in the belly. Laparoscopic surgery is done through several small cuts. The doctor puts a lighted tube, or scope, and other surgical tools through small cuts in your belly. The doctor is able to see your organs with the scope. There are different types of bariatric surgery. Gastric sleeve surgery  The surgery is usually done through several small incisions in the belly. The doctor removes more than half of your stomach. This leaves a thin sleeve, or tube, that is about the size of a banana. Because part of your stomach has been removed, this can't be reversed. Baltazar-en-Y gastric bypass surgery  Baltazar-en-Y (say \"luciano-en-why\") surgery changes the connection between the stomach and the intestines. The doctor separates a section of your stomach from the rest of your stomach. This makes a small pouch. The new pouch will hold the food you eat. The doctor connects the stomach pouch to the middle part of the small intestine. Gastric banding surgery  The surgery is usually done through several small incisions in the belly. The doctor wraps a band around the upper part of the stomach. This creates a small pouch. The small size of the pouch means that you will get full after you eat just a small amount of food. The doctor can inflate or deflate the band to adjust the size. This lets the doctor adjust how quickly food passes from the new pouch into the stomach.  It does not change the connection between the stomach and the intestines. What can you expect after the surgery? You may stay in the hospital for one or more days after the surgery. How long you stay depends on the type of surgery you had. Most people need 2 to 4 weeks before they are ready to get back to their usual routine. For the first 2 to 6 weeks after surgery, you probably will need to follow a liquid or soft diet. Bit by bit, you will be able to eat more solid foods. Your doctor may advise you to work with a dietitian. This way you'll be sure to get enough protein, vitamins, and minerals while you are losing weight. Even with a healthy diet, you may need to take vitamin and mineral supplements. After surgery, you will not be able to eat very much at one time. You will get full quickly. Try not to eat too much at one time or eat foods that are high in fat or sugar. If you do, you may vomit, get stomach pain, or have diarrhea. You probably will lose weight very quickly in the first few months after surgery. As time goes on, your weight loss will slow down. You will have regular doctor visits to check how you are doing. Think of bariatric surgery as a tool to help you lose weight. It isn't an instant fix. You will still need to eat a healthy diet and get regular exercise. This will help you reach your weight goal and avoid regaining the weight you lose. Follow-up care is a key part of your treatment and safety. Be sure to make and go to all appointments, and call your doctor if you are having problems. It's also a good idea to know your test results and keep a list of the medicines you take. Where can you learn more? Go to http://anna-alistair.info/. Enter G469 in the search box to learn more about \"Learning About Bariatric Surgery. \"  Current as of: October 13, 2016  Content Version: 11.2  © 2303-4233 [employer].  Care instructions adapted under license by [employer] (which disclaims liability or warranty for this information). If you have questions about a medical condition or this instruction, always ask your healthcare professional. [place] [address] any warranty or liability for your use of this information.

## 2017-05-25 NOTE — MR AVS SNAPSHOT
Visit Information Date & Time Provider Department Dept. Phone Encounter #  
 5/25/2017  1:20 PM Lizzeth Bustamante NP [facility] [phone] [id number] Follow-up Instructions Routing History Your Appointments 6/12/2017  8:15 AM  
LAB with LAB [facility] ([facility] [place]) Appt Note: Dr. Chester Jones Fasting 3979 [facility] [phone]  
  
   
 [address]  
  
    
 6/19/2017 11:30 AM  
Office Visit with Venita Poole DO [facility] ([facility] [place]) Appt Note: 1 MO F/U Weight, BP  
 [address] 
[address] [facility] [address]  
[phone]  
  
   
 [address] 7/17/2017  2:00 PM  
Office Visit with Venita Poole DO [facility] ([facility] [place]) Appt Note: 1 MO F/U Weight, BP  
 [address] 
[address] [facility] [id number] [phone]  
  
    
 8/15/2017  8:30 AM  
Office Visit with Venita Poole DO [facility] ([facility] [place]) Appt Note: 1 MO F/U Weight, BP  
 [address] 
[address] [facility] [id number] [phone] Upcoming Health Maintenance Date Due INFLUENZA AGE 9 TO ADULT 8/1/2017 PAP AKA CERVICAL CYTOLOGY 3/23/2018 DTaP/Tdap/Td series (2 - Td) 4/10/2027 Allergies as of 5/25/2017  Review Complete On: 5/25/2017 By: Lizzeth Bustamante NP Severity Noted Reaction Type Reactions Latex Low 03/23/2015    Itching Other Medication High 02/15/2017    Rash 3533 [facility] Metformin  02/15/2017   Intolerance Diarrhea  
 severe Mirena [Levonorgestrel]  10/31/2013    Other (comments) Severe abdominal pain GALACTORRHEA Current Immunizations  Reviewed on 5/15/2017 Name Date Influenza Vaccine 10/3/2016, 10/31/2013 Not reviewed this visit You Were Diagnosed With   
  
 Codes Comments Morbid obesity due to excess calories ([facility] [address]    -  Primary ICD-10-CM: E66.01 
ICD-9-CM: 278.01 Pre-diabetes     ICD-10-CM: R73.03 
ICD-9-CM: 790.29 Pure hypercholesterolemia     ICD-10-CM: E78.00 ICD-9-CM: 272.0 Hypothyroidism due to acquired atrophy of thyroid     ICD-10-CM: E03.4 ICD-9-CM: 244.8, 246.8 BMI 36.0-36.9,adult     ICD-10-CM: B19.08 
ICD-9-CM: V85.36 Vitals BP Pulse Temp Resp Height(growth percentile) Weight(growth percentile) 124/70 (BP 1 Location: Left arm, BP Patient Position: Sitting) (!) 110 98.5 °F (36.9 °C) (Oral) 18 5' 3\" (1.6 m) 206 lb (93.4 kg) SpO2 BMI OB Status Smoking Status 98% 36.49 kg/m2 Having regular periods Never Smoker Vitals History BMI and BSA Data Body Mass Index Body Surface Area  
 36.49 kg/m 2 2.04 m 2 Preferred Pharmacy Pharmacy Name Phone [facility]/PHARMACY #[id number], [place] - [address] [phone] Your Updated Medication List  
  
   
This list is accurate as of: 5/25/17  2:36 PM.  Always use your most recent med list.  
  
  
  
  
 albuterol 90 mcg/actuation inhaler Commonly known as:  PROVENTIL HFA, VENTOLIN HFA, PROAIR HFA Take 2 Puffs by inhalation every four (4) hours as needed for Wheezing. Indications: BRONCHOSPASM PREVENTION  
  
 ergocalciferol 50,000 unit capsule Commonly known as:  ERGOCALCIFEROL Take 1 Cap by mouth every seven (7) days for 60 days. For low vit D. Take for 2 months then start over the counter vit D 1000 units daily. Indications: VITAMIN D DEFICIENCY  
  
 levothyroxine 137 mcg tablet Commonly known as:  SYNTHROID Take 137 mcg by mouth Daily (before breakfast). Indications: hypothyroidism  
  
 phendimetrazine tartrate 105 mg Cper Take  by mouth.  
  
 phentermine 37.5 mg tablet Commonly known as:  ADIPEX-P  
 Take 1 Tab by mouth every morning. Max Daily Amount: 37.5 mg. Indications: WEIGHT LOSS MANAGEMENT FOR OBESE PATIENT (BMI >= 30) We Performed the Following REFERRAL TO NUTRITION [REF50 Custom] Referral Information Referral ID Referred By Referred To  
  
 [id number] Di Klein Not Available Visits Status Start Date End Date 1 New Request 5/25/17 5/25/18 If your referral has a status of pending review or denied, additional information will be sent to support the outcome of this decision. Patient Instructions Learning About Bariatric Surgery What is bariatric surgery? Bariatric surgery is surgery to help you lose weight. This type of surgery is only used for people who are very overweight and have not been able to lose weight with diet and exercise. This surgery makes the stomach smaller. Some types of surgery also change the connection between your stomach and intestines. How is bariatric surgery done? Bariatric surgery may be either \"open\" or \"laparoscopic. \" Open surgery is done through a large cut (incision) in the belly. Laparoscopic surgery is done through several small cuts. The doctor puts a lighted tube, or scope, and other surgical tools through small cuts in your belly. The doctor is able to see your organs with the scope. There are different types of bariatric surgery. Gastric sleeve surgery The surgery is usually done through several small incisions in the belly. The doctor removes more than half of your stomach. This leaves a thin sleeve, or tube, that is about the size of a banana. Because part of your stomach has been removed, this can't be reversed. Baltazar-en-Y gastric bypass surgery Baltazar-en-Y (say \"luciano-en-why\") surgery changes the connection between the stomach and the intestines. The doctor separates a section of your stomach from the rest of your stomach. This makes a small pouch.  The new pouch will hold the food you eat. The doctor connects the stomach pouch to the middle part of the small intestine. Gastric banding surgery The surgery is usually done through several small incisions in the belly. The doctor wraps a band around the upper part of the stomach. This creates a small pouch. The small size of the pouch means that you will get full after you eat just a small amount of food. The doctor can inflate or deflate the band to adjust the size. This lets the doctor adjust how quickly food passes from the new pouch into the stomach. It does not change the connection between the stomach and the intestines. What can you expect after the surgery? You may stay in the hospital for one or more days after the surgery. How long you stay depends on the type of surgery you had. Most people need 2 to 4 weeks before they are ready to get back to their usual routine. For the first 2 to 6 weeks after surgery, you probably will need to follow a liquid or soft diet. Bit by bit, you will be able to eat more solid foods. Your doctor may advise you to work with a dietitian. This way you'll be sure to get enough protein, vitamins, and minerals while you are losing weight. Even with a healthy diet, you may need to take vitamin and mineral supplements. After surgery, you will not be able to eat very much at one time. You will get full quickly. Try not to eat too much at one time or eat foods that are high in fat or sugar. If you do, you may vomit, get stomach pain, or have diarrhea. You probably will lose weight very quickly in the first few months after surgery. As time goes on, your weight loss will slow down. You will have regular doctor visits to check how you are doing. Think of bariatric surgery as a tool to help you lose weight. It isn't an instant fix. You will still need to eat a healthy diet and get regular exercise. This will help you reach your weight goal and avoid regaining the weight you lose. Follow-up care is a key part of your treatment and safety. Be sure to make and go to all appointments, and call your doctor if you are having problems. It's also a good idea to know your test results and keep a list of the medicines you take. Where can you learn more? Go to http://anna-alistair.info/. Enter G469 in the search box to learn more about \"Learning About Bariatric Surgery. \" Current as of: October 13, 2016 Content Version: 11.2 © 9810-5098 [employer]. Care instructions adapted under license by [employer] (which disclaims liability or warranty for this information). If you have questions about a medical condition or this instruction, always ask your healthcare professional. [address] any warranty or liability for your use of this information. Introducing [facility]! Dear Sarah Esquivel: Thank you for requesting a [employer] account. Our records indicate that you already have an active [employer] account. You can access your account anytime at [email or web address] Did you know that you can access your hospital and ER discharge instructions at any time in [employer]? You can also review all of your test results from your hospital stay or ER visit. Additional Information If you have questions, please visit the Frequently Asked Questions section of the [employer] website at [email or web address] Remember, [employer] is NOT to be used for urgent needs. For medical emergencies, dial 911. Now available from your iPhone and Android! Please provide this summary of care documentation to your next provider. Your primary care clinician is listed as Lalo Lopez. If you have any questions after today's visit, please call [phone].

## 2017-06-13 LAB
T4 FREE SERPL-MCNC: 2.38 NG/DL (ref 0.82–1.77)
TSH SERPL DL<=0.005 MIU/L-ACNC: 0.32 UIU/ML (ref 0.45–4.5)

## 2017-06-19 ENCOUNTER — OFFICE VISIT (OUTPATIENT)
Dept: FAMILY MEDICINE CLINIC | Age: 32
End: 2017-06-19

## 2017-06-19 VITALS
HEART RATE: 83 BPM | SYSTOLIC BLOOD PRESSURE: 108 MMHG | BODY MASS INDEX: 34.99 KG/M2 | TEMPERATURE: 98.3 F | RESPIRATION RATE: 18 BRPM | HEIGHT: 63 IN | OXYGEN SATURATION: 100 % | DIASTOLIC BLOOD PRESSURE: 75 MMHG | WEIGHT: 197.5 LBS

## 2017-06-19 DIAGNOSIS — E66.9 OBESITY, CLASS I, BMI 30-34.9: ICD-10-CM

## 2017-06-19 DIAGNOSIS — R63.4 WEIGHT LOSS: ICD-10-CM

## 2017-06-19 DIAGNOSIS — E55.9 VITAMIN D DEFICIENCY: ICD-10-CM

## 2017-06-19 DIAGNOSIS — K59.09 CHRONIC CONSTIPATION: ICD-10-CM

## 2017-06-19 DIAGNOSIS — E78.00 PURE HYPERCHOLESTEROLEMIA: ICD-10-CM

## 2017-06-19 DIAGNOSIS — E03.4 HYPOTHYROIDISM DUE TO ACQUIRED ATROPHY OF THYROID: Primary | ICD-10-CM

## 2017-06-19 DIAGNOSIS — R73.9 HYPERGLYCEMIA: ICD-10-CM

## 2017-06-19 DIAGNOSIS — E01.0 THYROMEGALY: ICD-10-CM

## 2017-06-19 PROBLEM — R73.03 PRE-DIABETES: Status: RESOLVED | Noted: 2017-05-25 | Resolved: 2017-06-19

## 2017-06-19 RX ORDER — PHENDIMETRAZINE TARTRATE 105 MG/1
1 CAPSULE, EXTENDED RELEASE ORAL DAILY
Qty: 30 CAP | Refills: 0 | Status: SHIPPED | OUTPATIENT
Start: 2017-06-19 | End: 2017-06-27 | Stop reason: SDUPTHER

## 2017-06-19 NOTE — PROGRESS NOTES
Chief Complaint   Patient presents with    Weight Management    Blood Pressure Check    Medication Refill     need to change diet pill     1. Have you been to the ER, urgent care clinic since your last visit? Hospitalized since your last visit? No    2. Have you seen or consulted any other health care providers outside of the [address] [place] [address] since your last visit? Include any pap smears or colon screening. No    In the event something were to happen to you and you were unable to speak on your behalf, do you have an Advance Directive/ Living Will in place stating your wishes? NO    If yes, do we have a copy on file NO    If no, would you like information:   Pt has a meeting in July to have it done.

## 2017-06-19 NOTE — MR AVS SNAPSHOT
Visit Information Date & Time Provider Department Dept. Phone Encounter #  
 6/19/2017 11:30 AM Sierra Brizuela DO [facility] [phone] [id number] Follow-up Instructions Return in about 1 month (around 7/19/2017) for weight, bp check, med refill . Your Appointments 7/17/2017  2:00 PM  
Office Visit with Sierra Brizuela DO [facility] ([facility] [address] [place]) Appt Note: 1 MO F/U Weight, BP  
 [address] 
[address] [facility] [address]  
[phone]  
  
   
 [address] 8/15/2017  8:30 AM  
Office Visit with Sierra Brizuela DO [facility] ([facility] [address] [place]) Appt Note: 1 MO F/U Weight, BP  
 [address] 
[address] [facility] [address] [phone] Upcoming Health Maintenance Date Due INFLUENZA AGE 9 TO ADULT 8/1/2017 PAP AKA CERVICAL CYTOLOGY 3/23/2018 DTaP/Tdap/Td series (2 - Td) 4/10/2027 Allergies as of 6/19/2017  Review Complete On: 6/19/2017 By: Arcelia Gallegos Severity Noted Reaction Type Reactions Latex Low 03/23/2015    Itching Other Medication High 02/15/2017    Rash [facility] Metformin  02/15/2017   Intolerance Diarrhea  
 severe Mirena [Levonorgestrel]  10/31/2013    Other (comments) Severe abdominal pain GALACTORRHEA Current Immunizations  Reviewed on 5/15/2017 Name Date Influenza Vaccine 10/3/2016, 10/31/2013 Not reviewed this visit You Were Diagnosed With   
  
 Codes Comments Hypothyroidism due to acquired atrophy of thyroid    -  Primary ICD-10-CM: E03.4 ICD-9-CM: 244.8, 246.8 slightly overtreated Chronic constipation     ICD-10-CM: K59.09 
ICD-9-CM: 564.00 resolved after Magnesium citrate Vitamin D deficiency     ICD-10-CM: E55.9 ICD-9-CM: 268.9 Pure hypercholesterolemia     ICD-10-CM: E78.00 ICD-9-CM: 272.0 Hyperglycemia     ICD-10-CM: R73.9 ICD-9-CM: 790.29 Obesity, Class I, BMI 30-34.9     ICD-10-CM: E66.9 ICD-9-CM: 278.00 Weight loss     ICD-10-CM: R63.4 ICD-9-CM: 783.21 10# since 05/2017 due to efforts Thyromegaly     ICD-10-CM: E01.0 ICD-9-CM: 240.9 mild Vitals BP Pulse Temp Resp Height(growth percentile) Weight(growth percentile) 108/75 (BP 1 Location: Right arm, BP Patient Position: Sitting) 83 98.3 °F (36.8 °C) (Oral) 18 5' 3\" (1.6 m) 197 lb 8 oz (89.6 kg) LMP SpO2 BMI OB Status Smoking Status 06/12/2017 (Approximate) 100% 34.99 kg/m2 Having regular periods Never Smoker BMI and BSA Data Body Mass Index Body Surface Area 34.99 kg/m 2 2 m 2 Preferred Pharmacy Pharmacy Name Phone [facility]/PHARMACY #[id number]- [place], [place] - [address] [phone] Your Updated Medication List  
  
   
This list is accurate as of: 6/19/17  1:23 PM.  Always use your most recent med list.  
  
  
  
  
 albuterol 90 mcg/actuation inhaler Commonly known as:  PROVENTIL HFA, VENTOLIN HFA, PROAIR HFA Take 2 Puffs by inhalation every four (4) hours as needed for Wheezing. Indications: BRONCHOSPASM PREVENTION  
  
 levothyroxine 137 mcg tablet Commonly known as:  SYNTHROID Take 137 mcg by mouth Daily (before breakfast). Indications: hypothyroidism * phendimetrazine tartrate 105 mg Cper Take  by mouth. * phendimetrazine tartrate 105 mg Cper Take 1 Cap by mouth daily. Max Daily Amount: 1 Cap. Indications: WEIGHT LOSS MANAGEMENT FOR OBESE PATIENT (BMI >= 30) * Notice: This list has 2 medication(s) that are the same as other medications prescribed for you. Read the directions carefully, and ask your doctor or other care provider to review them with you. Prescriptions Printed Refills  
 phendimetrazine tartrate 105 mg cpER 0 Sig: Take 1 Cap by mouth daily. Max Daily Amount: 1 Cap.  Indications: WEIGHT LOSS MANAGEMENT FOR OBESE PATIENT (BMI >= 30) Class: Print Route: Oral  
  
Follow-up Instructions Return in about 1 month (around 7/19/2017) for weight, bp check, med refill . To-Do List   
 08/19/2017 Imaging:  US THYROID/PARATHYROID/SOFT TISS Introducing [facility]! Dear Marilynn Curling: Thank you for requesting a [employer] account. Our records indicate that you already have an active [employer] account. You can access your account anytime at [email or web address] Did you know that you can access your hospital and ER discharge instructions at any time in [employer]? You can also review all of your test results from your hospital stay or ER visit. Additional Information If you have questions, please visit the Frequently Asked Questions section of the [employer] website at [email or web address] Remember, [employer] is NOT to be used for urgent needs. For medical emergencies, dial 911. Now available from your iPhone and Android! Please provide this summary of care documentation to your next provider. Your primary care clinician is listed as Lenore Araya. If you have any questions after today's visit, please call [phone].

## 2017-06-19 NOTE — PROGRESS NOTES
HISTORY OF PRESENT ILLNESS  Aubrey Holloway is a 32 y.o. female presents with Weight Management; Blood Pressure Check; Medication Refill; Results; and Thyroid Problem      Agree with nurse note. Hyperglycemic pt with hypothyroidism, hypercholesterolemia, and Vit D deficiency presents to the office with a BP of 108/75. On 06/12/17, TSH was 0.317, down from 9.22 and FT4 was 2.38. She currently takes Synthroid 137 mcg daily and a 1/2 tablet on Sundays. She is tolerating Adipex-P 37.5 mg well, month 3 of 3. Last prescribed on 05/15/17. She has noticed an increase of energy and decreased appetite since starting the medication. She makes her own juice consisting of fruits and green vegetables for breakfast. She increased her protein intake. She drinks 101-135 oz of water daily. She has exercised 4x but this week plans to start exercising 3x weekly. She met with a bariatric NP for gastric surgery evaluation but would like to continue working on losing the weight on her own. She lost 10 pounds since the last visit. Percent body fat has decreased from 42.5% to 41.1%, waist circumference measurement has changed from 46.5\" to 39\". Overall, patient is pleased and requests a refill of the medication today. She mentions experiencing some constipation last week. She took OTC Magnesium Citrate with relief. Patient denies fatigue, sleep disturbance, chest pain, heart palpitations, nausea, dry mouth, signs of depression. Written by aakash Alcantar, as dictated by Dr. Clement Loo DO.    MARIA G    Review of Systems negative except as noted above in HPI.     ALLERGIES:    Allergies   Allergen Reactions    Latex Itching    Other Medication Rash     SPRINTEC    Metformin Diarrhea     severe    Mirena [Levonorgestrel] Other (comments)     Severe abdominal pain  GALACTORRHEA       CURRENT MEDICATIONS:    Outpatient Prescriptions Marked as Taking for the 6/19/17 encounter (Office Visit) with Adi Ventura Julien Gomez,    Medication Sig Dispense Refill    [DISCONTINUED] phendimetrazine tartrate 105 mg cpER Take 1 Cap by mouth daily. Max Daily Amount: 1 Cap. Indications: WEIGHT LOSS MANAGEMENT FOR OBESE PATIENT (BMI >= 30) 30 Cap 0    [DISCONTINUED] phendimetrazine tartrate 105 mg cpER Take  by mouth.  levothyroxine (SYNTHROID) 137 mcg tablet Take 137 mcg by mouth Daily (before breakfast). Indications: hypothyroidism 30 Tab 11       PAST MEDICAL HISTORY:    Past Medical History:   Diagnosis Date    Allergy, unspecified not elsewhere classified     Ankle sprain     bilaterally. 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 Dr. Rajani Mejia, OB. Karan Fishman, Genetic Counselor   Corinne Bailey.  Elevated liver enzymes     Heart palpitations 2016    Exertional.  Dr. Leoncio Pruitt. Negative Exercise Stress Test.    Hyperinsulinemia 2015    Hypothyroidism due to acquired atrophy of thyroid     Morbid obesity due to excess calories ([address] 2017    Obesity, Class II, BMI 35-39.9, with comorbidity ([address] 2017    Pre-diabetes 2017    Previous on Metformin for treatment.  Pure hypercholesterolemia 2013       PAST SURGICAL HISTORY:    Past Surgical History:   Procedure Laterality Date    HX  SECTION      HX DILATION AND CURETTAGE  2016    due to Cystic Hygroma/Terrell's Syndrome fetus. Dr. Emory Fitzgerald   (placed),  (removed)    IUD. Dr. Michael Ha.  HX TONSIL AND ADENOIDECTOMY  childhood    HX WISDOM TEETH EXTRACTION  2016    All 4 removed.        FAMILY HISTORY:    Family History   Problem Relation Age of Onset   [address] [facility] Arthritis-osteo Mother     Hypertension Mother    [facility] Migraines Mother     Other Mother      IBS    Hypertension Maternal Uncle     Asthma Maternal Uncle     Hypertension Maternal Grandmother     Other Maternal Grandmother      diverticulosis    Arthritis-osteo Maternal Grandmother     Cancer Maternal Grandfather      prostate    Arthritis-osteo Maternal Grandfather     Other Maternal Aunt      uterine fibroids    Obesity Other        SOCIAL HISTORY:    Social History     Social History    Marital status: SINGLE     Spouse name: N/A    Number of children: 1    Years of education: N/A     Social History Main Topics    Smoking status: Never Smoker    Smokeless tobacco: Never Used    Alcohol use 0.6 oz/week     1 Glasses of wine, 0 Standard drinks or equivalent per week      Comment: Moscato - 1 every 2 weeks    Drug use: No    Sexual activity: Yes     Partners: Male     Birth control/ protection: Condom     Other Topics Concern    None     Social History Narrative       IMMUNIZATIONS:    Immunization History   Administered Date(s) Administered    Influenza Vaccine 10/31/2013, 10/03/2016       PHYSICAL EXAMINATION    Vital Signs    Visit Vitals    /75 (BP 1 Location: Right arm, BP Patient Position: Sitting)    Pulse 83    Temp 98.3 °F (36.8 °C) (Oral)    Resp 18    Ht 5' 3\" (1.6 m)    Wt 197 lb 8 oz (89.6 kg)    LMP 06/12/2017 (Approximate)    SpO2 100%    BMI 34.99 kg/m2       Weight Metrics 6/27/2017 6/20/2017 6/19/2017 6/19/2017 5/25/2017 5/15/2017 5/15/2017   Weight 195 lb 197 lb 5 oz - 197 lb 8 oz 206 lb - 207 lb 14.4 oz   Neck Circ (inches) - - - - - - -   Waist Measure Inches - - 39 - - 46.5 -   Body Fat % - - 41.1 - - 42.5 -   BMI 34.54 kg/m2 34.95 kg/m2 - 34.99 kg/m2 36.49 kg/m2 - 38.02 kg/m2       General appearance - Well nourished. Well appearing. Well developed. No acute distress. Obese. Head - Normocephalic. Atraumatic. Eyes - pupils equal and reactive, extraocular eye movements intact, sclera anicteric  Ears - Hearing is grossly normal bilaterally. Nose - normal and patent, no erythema, discharge or polyps   Mouth - mucous membranes moist, pharynx normal with cobblestone appearance.   No erythema, white exudate or obstruction. Neck - supple. Midline trachea. No carotid bruits are noted. Mild thyromegaly. Chest - clear to auscultation bilaterally anterriorly and posteriorly. No wheezes, rales or rhonchi. Breath sounds are symmetrical and unlabored bilaterally. Heart - normal rate. Regular rhythm, normal S1, S2. No murmur. No rubs, clicks or gallops noted. Abdomen - soft and distended. No masses or organomegaly. No rebound, rigidity or guarding. Bowel sounds normal x 4 quadrants. No tenderness noted. Neurological - awake, alert and oriented to person, place, and time and event. Cranial nerves II through XII intact. Muscle strength is +5/5 x 4 extremities. Sensation is intact to light touch bilaterally. Steady gait. Heme/Lymph - peripheral pulses normal x 4 extremities. No peripheral edema is noted. No cervical adenopathy noted. Skin - no rashes, erythema, ecchymosis, lacerations, abrasions, suspicious moles noted. No skin tags. No acanthosis nigricans noted in the axilla or neck. Psychological -   normal behavior, speech, dress and thought processes. Good insight. Good eye contact. Normal affect. Appropriate mood.       DATA REVIEWED    Lab Results   Component Value Date/Time    WBC 9.4 02/01/2017 10:00 AM    Hemoglobin (POC) 13.6 02/21/2011 03:39 PM    HGB 13.4 02/01/2017 10:00 AM    Hematocrit (POC) 40 02/21/2011 03:39 PM    HCT 41 02/01/2017 10:00 AM    PLATELET 461 02/92/6279 10:00 AM    MCV 79 02/01/2017 10:00 AM     Lab Results   Component Value Date/Time    Sodium 140 04/03/2017 08:15 AM    Potassium 4.6 04/03/2017 08:15 AM    Chloride 100 04/03/2017 08:15 AM    CO2 23 04/03/2017 08:15 AM    Anion gap 13 02/01/2017 10:00 AM    Glucose 92 04/03/2017 08:15 AM    BUN 7 04/03/2017 08:15 AM    Creatinine 0.58 04/03/2017 08:15 AM    BUN/Creatinine ratio 12 04/03/2017 08:15 AM    GFR est  04/03/2017 08:15 AM    GFR est non- 04/03/2017 08:15 AM    Calcium 9.6 04/03/2017 08:15 AM    Bilirubin, total 0.3 04/03/2017 08:15 AM    AST (SGOT) 12 04/03/2017 08:15 AM    Alk. phosphatase 67 04/03/2017 08:15 AM    Protein, total 7.1 04/03/2017 08:15 AM    Albumin 4.4 04/03/2017 08:15 AM    Globulin 3.9 05/31/2015 07:58 PM    A-G Ratio 1.6 04/03/2017 08:15 AM    ALT (SGPT) 13 04/03/2017 08:15 AM     Lab Results   Component Value Date/Time    Cholesterol, total 228 02/01/2017 10:00 AM    HDL Cholesterol 58 02/01/2017 10:00 AM    LDL, calculated 167 02/01/2017 10:00 AM    VLDL, calculated 14 05/27/2016 10:16 AM    Triglyceride 83 02/01/2017 10:00 AM     Lab Results   Component Value Date/Time    Vitamin D 25-Hydroxy 12 02/01/2017 10:00 AM    VITAMIN D, 25-HYDROXY 20.3 04/03/2017 08:15 AM       Lab Results   Component Value Date/Time    Hemoglobin A1c 6.1 05/27/2016 10:16 AM     Lab Results   Component Value Date/Time    TSH 0.317 06/12/2017 08:12 AM    TSH 9.22 02/01/2017 10:00 AM    T4, Free 2.38 06/12/2017 08:12 AM       ASSESSMENT and PLAN    ICD-10-CM ICD-9-CM    1. Hypothyroidism due to acquired atrophy of thyroid E03.4 244.8      246.8     slightly overtreated   2. Chronic constipation K59.09 564.00     resolved after Magnesium citrate   3. Vitamin D deficiency E55.9 268.9    4. Pure hypercholesterolemia E78.00 272.0    5. Hyperglycemia R73.9 790.29    6. Obesity, Class I, BMI 30-34.9 E66.9 278.00 DISCONTINUED: phendimetrazine tartrate 105 mg cpER   7. Weight loss R63.4 783.21     10# since 05/2017 due to efforts   8. Thyromegaly E01.0 240.9 US THYROID/PARATHYROID/SOFT TISS    mild        Thyroid US ordered. Continue current medications and care. Skip Synthroid dose on Sundays. Change from Phentermine to Phendimetrazine. Prescriptions written and given to patient (Phendimetrazine 105 mg, month 1 of 3.)  Medication side effects discussed. Recheck TSH and FT4 in 6-8 weeks. Discussed the patient's BMI with her.   The BMI follow up plan is as follows: I have counseled this patient on diet and exercise regimens. Diet recommendations:  Decrease carbohydrates (white foods including flour, white bread, white rice, white pasta, white potatoes; corn, sweet foods, sweet drinks and alcohol), increase green leafy vegetables and protein with each meal.  Avoid fried foods. Eat 3-5 small meals daily. Do not skip meals. Ok to use meal replacements up to twice daily with protein goal of 60 mg per meal.   Recommend OTC Premiere Protein bars or shakes. Increase water intake. Increase physical activity to 30 minutes daily for health benefit or 60 minutes daily to prevent weight regain, as tolerated. Physical Activity prescription:  A goal of 30 minutes physical activity daily is recommended for health benefit and at least 60 minutes daily to prevent weight regain. For weight loss, no less than 75% needs to be aerobic (i.e. Walking) and no more than 25% resistance exercising (i.e. Weight lifting). For weight maintenance phase, 50% aerobic and 50% resistance exercises. Sleep prescription:    A goal of 7-8 hours of uninterrupted sleep is recommended to turn off the Grehlin hormone to be released from the stomach and triggers appetite while promoting weight gain. Proper rest turns on Leptin hormone to be released from white adipose tissue and promotes weight loss. Counseled patient on: weight loss goals, emphasizing a 5-10% weight loss in 6-12 months and strategies. Hypothyroidism. Constipation. Immunizations noted. Praised pt for weight loss efforts and progress. Patient was offered a choice/choices in the treatment plan today. Patient expresses understanding of the plan and agrees with recommendations. Patient declines any additional handouts. Patient is satisfied with previous handouts received from our office    Follow-up Disposition:  Return in about 1 month (around 7/19/2017) for weight, bp check, med refill .     Written by aakash Stevenson, as dictated by Dr. Roro Collins, DO.    Documentation True and Accepted by Naldo Schulz.  Carlos Torre.

## 2017-06-20 ENCOUNTER — HOSPITAL ENCOUNTER (OUTPATIENT)
Dept: ULTRASOUND IMAGING | Age: 32
Discharge: HOME | End: 2017-06-20
Attending: FAMILY MEDICINE
Payer: COMMERCIAL

## 2017-06-20 ENCOUNTER — HOSPITAL ENCOUNTER (EMERGENCY)
Age: 32
Discharge: HOME | End: 2017-06-20
Attending: EMERGENCY MEDICINE
Payer: COMMERCIAL

## 2017-06-20 VITALS
SYSTOLIC BLOOD PRESSURE: 148 MMHG | BODY MASS INDEX: 34.96 KG/M2 | TEMPERATURE: 98.5 F | OXYGEN SATURATION: 100 % | DIASTOLIC BLOOD PRESSURE: 89 MMHG | HEART RATE: 102 BPM | RESPIRATION RATE: 18 BRPM | WEIGHT: 197.31 LBS | HEIGHT: 63 IN

## 2017-06-20 DIAGNOSIS — E01.0 THYROMEGALY: ICD-10-CM

## 2017-06-20 DIAGNOSIS — M54.12 CERVICAL RADICULOPATHY: ICD-10-CM
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
## 2017-06-20 DIAGNOSIS — S16.1XXA CERVICAL STRAIN, INITIAL ENCOUNTER: Primary | ICD-10-CM

## 2017-06-20 PROCEDURE — 99283 EMERGENCY DEPT VISIT LOW MDM: CPT

## 2017-06-20 PROCEDURE — 74011250637 HC RX REV CODE- 250/637: Performed by: PHYSICIAN ASSISTANT

## 2017-06-20 PROCEDURE — 76536 US EXAM OF HEAD AND NECK: CPT

## 2017-06-20 RX ORDER — CYCLOBENZAPRINE HCL 10 MG
10 TABLET ORAL
Status: COMPLETED | OUTPATIENT
Start: 2017-06-20 | End: 2017-06-20

## 2017-06-20 RX ORDER — IBUPROFEN 600 MG/1
600 TABLET ORAL
Qty: 20 TAB | Refills: 0 | OUTPATIENT
Start: 2017-06-20 | End: 2017-07-04

## 2017-06-20 RX ORDER — IBUPROFEN 600 MG/1
600 TABLET ORAL
Status: COMPLETED | OUTPATIENT
Start: 2017-06-20 | End: 2017-06-20

## 2017-06-20 RX ORDER — CYCLOBENZAPRINE HCL 10 MG
10 TABLET ORAL
Qty: 15 TAB | Refills: 0 | OUTPATIENT
Start: 2017-06-20 | End: 2017-07-04

## 2017-06-20 RX ADMIN — IBUPROFEN 600 MG: 600 TABLET, FILM COATED ORAL at 20:48

## 2017-06-20 RX ADMIN — CYCLOBENZAPRINE HYDROCHLORIDE 10 MG: 10 TABLET, FILM COATED ORAL at 20:48

## 2017-06-20 NOTE — LETTER
[address] 
\[facility]\"" EMERGENCY DEPT 
[address] [place] [address] [facility] [address] [id number] [phone] Work/School Note Date: 6/20/2017 To Whom It May concern: 
 
Hortencia Rodgers was seen and treated today in the emergency room by the following provider(s): 
Attending Provider: Jessica Low MD 
Physician Assistant: Gilles Landau, PA. Laura Emmanuel may return to work on 6/22/17, may return earlier if feeling better. Sincerely, Gilles Landau, [place]

## 2017-06-21 ENCOUNTER — PATIENT OUTREACH (OUTPATIENT)
Dept: OTHER | Age: 32
End: 2017-06-21

## 2017-06-21 NOTE — PROGRESS NOTES
Transition Of Care Note    Patient discharged from ED visit for cervical strain with numbness/tingling in her left arm. Medical History:     Past Medical History:   Diagnosis Date    Allergy, unspecified not elsewhere classified     Ankle sprain     bilaterally. 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 Dr. Leena Watson, OB. Keturah Paredes, Genetic Counselor   Alisa Santos.  Elevated liver enzymes     Heart palpitations 2016    Exertional.  Dr. Sarah Chase. Negative Exercise Stress Test.    Hyperinsulinemia 2015    Hypothyroidism due to acquired atrophy of thyroid     Morbid obesity due to excess calories ([address] 2017    Obesity, Class II, BMI 35-39.9, with comorbidity ([facility] 2017    Pre-diabetes 2017    Previous on Metformin for treatment.  Pure hypercholesterolemia 2013       Care Manager contacted the patient by telephone to perform post ED discharge assessment. Verified  and zip code with patient as identifiers. Provided introduction to self, and explanation of the Nurse Care Manager role. Medication:   Performed medication reconciliation with patient, and patient verbalizes understanding of administration of home medications. There were no barriers to obtaining medications identified at this time. Patient is at work, but can speak briefly and agreed to [facility]. States she last took her Ibuprofen last evening along with her Flexeril with complete relief. She is experiencing the left arm tingliing at work this morning and has a call out ot her PCP for a FU appointment. She also has the name of a Orthopedic physician if she is unable to see her PCP this week. She is currently keeping her arm immobilized while at work which is helping. No new symptoms have occurred. Support system:  patient    Discharge Instructions :  Reviewed discharge instructions with patient.   Patient verbalizes understanding of discharge instructions and follow-up care. Red Flags:  Unable to move an arm or leg  New or worsening symptoms like numbness or tingling, weakness, pain. Advance Care Planning:   Patient was offered the opportunity to discuss advance care planning:  no     Does patient have an Advance Directive:  no   If no, did you provide information on [facility] Connections?  no     PCP/Specialist follow up: Patient scheduled to follow up with Adelina Pichardo, DO this week if she can get an appointment. Reviewed red flags with patient, and patient verbalizes understanding. Patient given an opportunity to ask questions. No other clinical/social/functional needs noted. The patient agrees to contact the PCP office for questions related to their healthcare. The patient expressed thanks, offered no additional questions and ended the call. Will FU next week with patient to see if she has FU with her PCP. To call me if she is unable to get an appt with her PCP this week.
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
## 2017-06-21 NOTE — DISCHARGE INSTRUCTIONS
Learning About Relief for Back Pain  What is back tension and strain? Back strain happens when you overstretch, or pull, a muscle in your back. You may hurt your back in an accident or when you exercise or lift something. Most back pain will get better with rest and time. You can take care of yourself at home to help your back heal.  What can you do first to relieve back pain? When you first feel back pain, try these steps:  · Walk. Take a short walk (10 to 20 minutes) on a level surface (no slopes, hills, or stairs) every 2 to 3 hours. Walk only distances you can manage without pain, especially leg pain. · Relax. Find a comfortable position for rest. Some people are comfortable on the floor or a medium-firm bed with a small pillow under their head and another under their knees. Some people prefer to lie on their side with a pillow between their knees. Don't stay in one position for too long. · Try heat or ice. Try using a heating pad on a low or medium setting, or take a warm shower, for 15 to 20 minutes every 2 to 3 hours. Or you can buy single-use heat wraps that last up to 8 hours. You can also try an ice pack for 10 to 15 minutes every 2 to 3 hours. You can use an ice pack or a bag of frozen vegetables wrapped in a thin towel. There is not strong evidence that either heat or ice will help, but you can try them to see if they help. You may also want to try switching between heat and cold. · Take pain medicine exactly as directed. ¨ If the doctor gave you a prescription medicine for pain, take it as prescribed. ¨ If you are not taking a prescription pain medicine, ask your doctor if you can take an over-the-counter medicine. What else can you do? · Stretch and exercise. Exercises that increase flexibility may relieve your pain and make it easier for your muscles to keep your spine in a good, neutral position. And don't forget to keep walking. · Do self-massage.  You can use self-massage to unwind after work or school or to energize yourself in the morning. You can easily massage your feet, hands, or neck. Self-massage works best if you are in comfortable clothes and are sitting or lying in a comfortable position. Use oil or lotion to massage bare skin. · Reduce stress. Back pain can lead to a vicious [place]: Distress about the pain tenses the muscles in your back, which in turn causes more pain. Learn how to relax your mind and your muscles to lower your stress. Where can you learn more? Go to http://anna-alistair.info/. Enter I555 in the search box to learn more about \"Learning About Relief for Back Pain. \"  Current as of: March 21, 2017  Content Version: 11.3  © 5042-0684 [employer]. Care instructions adapted under license by [employer] (which disclaims liability or warranty for this information). If you have questions about a medical condition or this instruction, always ask your healthcare professional. [place] [address] any warranty or liability for your use of this information.
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
## 2017-06-21 NOTE — ED NOTES
Discharge instructions reviewed with patient by PA. Pt denies questions or concerns at this time. Pt appears in no acute distress. Pt ambulatory out of department with steady gait with friend to drive.

## 2017-06-23 DIAGNOSIS — E66.9 OBESITY, CLASS I, BMI 30-34.9: ICD-10-CM
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
## 2017-06-27 ENCOUNTER — HOSPITAL ENCOUNTER (EMERGENCY)
Age: 32
Discharge: HOME | End: 2017-06-27
Attending: FAMILY MEDICINE

## 2017-06-27 VITALS
TEMPERATURE: 97.3 F | DIASTOLIC BLOOD PRESSURE: 81 MMHG | BODY MASS INDEX: 34.55 KG/M2 | RESPIRATION RATE: 16 BRPM | HEIGHT: 63 IN | WEIGHT: 195 LBS | HEART RATE: 96 BPM | SYSTOLIC BLOOD PRESSURE: 131 MMHG | OXYGEN SATURATION: 97 %

## 2017-06-27 DIAGNOSIS — S16.1XXD CERVICAL STRAIN, SUBSEQUENT ENCOUNTER: Primary | ICD-10-CM
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
## 2017-06-27 DIAGNOSIS — M54.12 CERVICAL RADICULOPATHY: ICD-10-CM

## 2017-06-27 RX ORDER — PREDNISONE 10 MG/1
TABLET ORAL
Qty: 21 TAB | Refills: 0 | Status: SHIPPED | OUTPATIENT
Start: 2017-06-27 | End: 2017-07-17 | Stop reason: ALTCHOICE

## 2017-06-27 RX ORDER — METHOCARBAMOL 500 MG/1
500 TABLET, FILM COATED ORAL
Qty: 20 TAB | Refills: 0 | Status: SHIPPED | OUTPATIENT
Start: 2017-06-27 | End: 2017-08-15 | Stop reason: ALTCHOICE
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
## 2017-06-27 NOTE — LETTER
[facility] 
[address] [place] [id number] 
[phone] Work/School Note Date: 6/27/2017 To Whom It May concern: 
 
Corry Dubose was seen and treated today in the urgent care center by the following provider(s): 
Attending Provider: Clay Oekefe MD.   
 
Corry Dubose may return to work on 6/28/2017. Sincerely, Clay Okeefe MD

## 2017-06-27 NOTE — DISCHARGE INSTRUCTIONS
Pinched Nerve in the Neck: Care Instructions  Your Care Instructions  A pinched nerve in the neck happens when a vertebra or disc in the upper part of your spine is damaged. This damage can happen because of an injury. Or it can just happen with age. The changes caused by the damage may put pressure on a nearby nerve root, pinching it. This causes symptoms such as sharp pain in your neck, shoulder, arm, or back. You may also have tingling or numbness. Sometimes it makes your arm weaker. The symptoms are usually worse when you turn your head or strain your neck. For many people, the symptoms get better over time and finally go away. Early treatment usually includes medicines for pain and swelling. Sometimes physical therapy and special exercises may help. Follow-up care is a key part of your treatment and safety. Be sure to make and go to all appointments, and call your doctor if you are having problems. It's also a good idea to know your test results and keep a list of the medicines you take. How can you care for yourself at home? · Be safe with medicines. Read and follow all instructions on the label. ¨ If the doctor gave you a prescription medicine for pain, take it as prescribed. ¨ If you are not taking a prescription pain medicine, ask your doctor if you can take an over-the-counter medicine. · Try using a heating pad on a low or medium setting for 15 to 20 minutes every 2 or 3 hours. Try a warm shower in place of one session with the heating pad. You can also buy single-use heat wraps that last up to 8 hours. · You can also try an ice pack for 10 to 15 minutes every 2 to 3 hours. There isn't strong evidence that either heat or ice will help. But you can try them to see if they help you. · Don't spend too long in one position. Take short breaks to move around and change positions. · Wear a seat belt and shoulder harness when you are in a car.   · Sleep with a pillow under your head and neck that keeps your neck straight. · If you were given a neck brace (cervical collar) to limit neck motion, wear it as instructed for as many days as your doctor tells you to. Do not wear it longer than you were told to. Wearing a brace for too long can lead to neck stiffness and can weaken the neck muscles. · Follow your doctor's instructions for gentle neck-stretching exercises. · Do not smoke. Smoking can slow healing of your discs. If you need help quitting, talk to your doctor about stop-smoking programs and medicines. These can increase your chances of quitting for good. · Avoid strenuous work or exercise until your doctor says it is okay. When should you call for help? Call 911 anytime you think you may need emergency care. For example, call if:  · You are unable to move an arm or a leg at all. Call your doctor now or seek immediate medical care if:  · You have new or worse symptoms in your arms, legs, chest, belly, or buttocks. Symptoms may include:  ¨ Numbness or tingling. ¨ Weakness. ¨ Pain. · You lose bladder or bowel control. Watch closely for changes in your health, and be sure to contact your doctor if:  · You are not getting better as expected. Where can you learn more? Go to http://anna-alistair.info/. Enter P451 in the search box to learn more about \"Pinched Nerve in the Neck: Care Instructions. \"  Current as of: March 21, 2017  Content Version: 11.3  © 2126-8890 [employer]. Care instructions adapted under license by [employer] (which disclaims liability or warranty for this information). If you have questions about a medical condition or this instruction, always ask your healthcare professional. [place] [address] any warranty or liability for your use of this information. Neck Strain or Sprain: Rehab Exercises  Your Care Instructions  Here are some examples of typical rehabilitation exercises for your condition.  Start each exercise slowly. Ease off the exercise if you start to have pain. Your doctor or physical therapist will tell you when you can start these exercises and which ones will work best for you. How to do the exercises  Neck rotation    1. Sit in a firm chair, or stand up straight. 2. Keeping your chin level, turn your head to the right, and hold for 15 to 30 seconds. 3. Turn your head to the left and hold for 15 to 30 seconds. 4. Repeat 2 to 4 times to each side. Neck stretches    1. Look straight ahead, and tip your right ear to your right shoulder. Do not let your left shoulder rise up as you tip your head to the right. 2. Hold for 15 to 30 seconds. 3. Tilt your head to the left. Do not let your right shoulder rise up as you tip your head to the left. 4. Hold for 15 to 30 seconds. 5. Repeat 2 to 4 times to each side. Forward neck flexion    1. Sit in a firm chair, or stand up straight. 2. Bend your head forward. 3. Hold for 15 to 30 seconds. 4. Repeat 2 to 4 times. Lateral (side) bend strengthening    1. With your right hand, place your first two fingers on your right temple. 2. Start to bend your head to the side while using gentle pressure from your fingers to keep your head from bending. 3. Hold for about 6 seconds. 4. Repeat 8 to 12 times. 5. Switch hands and repeat the same exercise on your left side. Forward bend strengthening    1. Place your first two fingers of either hand on your forehead. 2. Start to bend your head forward while using gentle pressure from your fingers to keep your head from bending. 3. Hold for about 6 seconds. 4. Repeat 8 to 12 times. Neutral position strengthening    1. Using one hand, place your fingertips on the back of your head at the top of your neck. 2. Start to bend your head backward while using gentle pressure from your fingers to keep your head from bending. 3. Hold for about 6 seconds. 4. Repeat 8 to 12 times. Chin tuck    1.  Lie on the floor with a rolled-up towel under your neck. Your head should be touching the floor. 2. Slowly bring your chin toward your chest.  3. Hold for a count of 6, and then relax for up to 10 seconds. 4. Repeat 8 to 12 times. Follow-up care is a key part of your treatment and safety. Be sure to make and go to all appointments, and call your doctor if you are having problems. It's also a good idea to know your test results and keep a list of the medicines you take. Where can you learn more? Go to http://anna-alistair.info/. Enter M679 in the search box to learn more about \"Neck Strain or Sprain: Rehab Exercises. \"  Current as of: March 21, 2017  Content Version: 11.3  © 1698-0819 [employer]. Care instructions adapted under license by [employer] (which disclaims liability or warranty for this information). If you have questions about a medical condition or this instruction, always ask your healthcare professional. [address] any warranty or liability for your use of this information.

## 2017-06-28 RX ORDER — PHENDIMETRAZINE TARTRATE 105 MG/1
1 CAPSULE, EXTENDED RELEASE ORAL DAILY
Qty: 30 CAP | Refills: 0 | Status: SHIPPED | OUTPATIENT
Start: 2017-06-28 | End: 2017-07-17 | Stop reason: SDUPTHER

## 2017-06-30 ENCOUNTER — HOSPITAL ENCOUNTER (OUTPATIENT)
Dept: PHYSICAL THERAPY | Age: 32
Discharge: HOME | End: 2017-06-30
Payer: COMMERCIAL

## 2017-06-30 PROCEDURE — 97162 PT EVAL MOD COMPLEX 30 MIN: CPT

## 2017-06-30 PROCEDURE — 97110 THERAPEUTIC EXERCISES: CPT
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
## 2017-07-03 ENCOUNTER — HOSPITAL ENCOUNTER (OUTPATIENT)
Dept: PHYSICAL THERAPY | Age: 32
Discharge: HOME | End: 2017-07-03
Payer: COMMERCIAL

## 2017-07-03 ENCOUNTER — PATIENT OUTREACH (OUTPATIENT)
Dept: OTHER | Age: 32
End: 2017-07-03

## 2017-07-03 PROCEDURE — 97110 THERAPEUTIC EXERCISES: CPT

## 2017-07-03 NOTE — PROGRESS NOTES
PT DAILY TREATMENT NOTE 2-15    Patient Name: Priscila Coronel  Date:7/3/2017  : 1985  [x]  Patient  Verified  Payor: Christine Dior / Plan: Nydia Dunn / Product Type: PPO /    In time: 10:00 AM  Out time: 10:50 AM  Total Treatment Time (min): 50 minutes  Visit #: 2     Treatment Area: Low back pain [M54.5]  Left shoulder pain [M25.512]  Neck pain [M54.2]    SUBJECTIVE  Pain Level (0-10 scale): 5/10  Any medication changes, allergies to medications, adverse drug reactions, diagnosis change, or new procedure performed?: [x] No    [] Yes (see summary sheet for update)  Subjective functional status/changes:   [] No changes reported  The denied any increased pain after the initial evaluation and has been able to do her exercises at home without difficulty. She reports that the stretches help to reduce the tightness she feels in the neck and back, but she still has some mild discomfort. She reports that she still has pain sitting for long periods at work. OBJECTIVE  50 min Therapeutic Exercise:  [x] See flow sheet :   Rationale: increase ROM, increase strength, improve coordination, improve balance and increase proprioception to improve the patients ability to perform work duties and ADLs with less pain or discomfort. With   [x] TE   [] TA   [] neuro   [] other: Patient Education: [x] Review HEP    [] Progressed/Changed HEP based on:   [] positioning   [] body mechanics   [] transfers   [] heat/ice application    [] other:      Other Objective/Functional Measures: None noted     Pain Level (0-10 scale) post treatment: 3/10    ASSESSMENT/Changes in Function:   The Pt tolerated the additions to her therapy program well without any increased pain or discomfort. Attempted to perform STM to left neck musculature, but the Pt was still too tender to palpation and was unable to tolerate this treatment technique. Will progress as tolerated next PT session.    Patient will continue to benefit from skilled PT services to modify and progress therapeutic interventions, address functional mobility deficits, address ROM deficits, address strength deficits, analyze and address soft tissue restrictions, analyze and cue movement patterns, analyze and modify body mechanics/ergonomics, assess and modify postural abnormalities and instruct in home and community integration to attain remaining goals. []  See Plan of Care  []  See progress note/recertification  []  See Discharge Summary         Progress towards goals / Updated goals:  Short Term Goals: To be accomplished in 6 treatments:  1. The Pt will be independent and compliant with her HEP. 2. The Pt will report a 50% reduction in pain. Long Term Goals: To be accomplished in 12 treatments:  1. The Pt will score the MCII on her FOTO survey demonstrating improved overall function (Neck 42 to 69 points; Lumbar 31 to 40 points). 2. The Pt will be able to sit >/=3 hrs with 0-2/10 pain to allow the Pt to be able to perform work duties with less pain or breaks needed.   3. The Pt will be able to stand/walk >/= 45 minutes with 0-2/10 pain to allow the Pt to be able to return to walking for exercise.     PLAN  [x]  Upgrade activities as tolerated     [x]  Continue plan of care  [x]  Update interventions per flow sheet       []  Discharge due to:_  []  Other:_      Bernarda Cornejo, PT 7/3/2017  10:24 AM

## 2017-07-10 DIAGNOSIS — E03.4 HYPOTHYROIDISM DUE TO ACQUIRED ATROPHY OF THYROID: ICD-10-CM

## 2017-07-11 LAB
T4 FREE SERPL-MCNC: 1.87 NG/DL (ref 0.82–1.77)
TSH SERPL DL<=0.005 MIU/L-ACNC: 0.44 UIU/ML (ref 0.45–4.5)

## 2017-07-17 ENCOUNTER — OFFICE VISIT (OUTPATIENT)
Dept: FAMILY MEDICINE CLINIC | Age: 32
End: 2017-07-17

## 2017-07-17 VITALS
DIASTOLIC BLOOD PRESSURE: 82 MMHG | WEIGHT: 192.8 LBS | SYSTOLIC BLOOD PRESSURE: 120 MMHG | BODY MASS INDEX: 34.16 KG/M2 | HEIGHT: 63 IN | OXYGEN SATURATION: 95 % | RESPIRATION RATE: 16 BRPM | TEMPERATURE: 98.2 F | HEART RATE: 92 BPM

## 2017-07-17 DIAGNOSIS — E66.9 OBESITY, CLASS I, BMI 30-34.9: ICD-10-CM

## 2017-07-17 DIAGNOSIS — E16.1 HYPERINSULINEMIA: ICD-10-CM

## 2017-07-17 DIAGNOSIS — R63.4 WEIGHT LOSS: ICD-10-CM

## 2017-07-17 DIAGNOSIS — R73.9 HYPERGLYCEMIA: ICD-10-CM

## 2017-07-17 DIAGNOSIS — E78.00 PURE HYPERCHOLESTEROLEMIA: ICD-10-CM

## 2017-07-17 DIAGNOSIS — E03.4 HYPOTHYROIDISM DUE TO ACQUIRED ATROPHY OF THYROID: Primary | ICD-10-CM

## 2017-07-17 PROBLEM — E66.01 MORBID OBESITY DUE TO EXCESS CALORIES (HCC): Status: RESOLVED | Noted: 2017-02-28 | Resolved: 2017-07-17

## 2017-07-17 RX ORDER — CYCLOBENZAPRINE HCL 10 MG
TABLET ORAL
Refills: 0 | COMMUNITY
Start: 2017-06-22 | End: 2017-07-17 | Stop reason: ALTCHOICE

## 2017-07-17 RX ORDER — IBUPROFEN 600 MG/1
TABLET ORAL
Refills: 0 | COMMUNITY
Start: 2017-06-22 | End: 2017-08-15 | Stop reason: ALTCHOICE

## 2017-07-17 RX ORDER — PHENTERMINE HYDROCHLORIDE 37.5 MG/1
TABLET ORAL
Refills: 0 | COMMUNITY
Start: 2017-06-27 | End: 2017-07-17 | Stop reason: ALTCHOICE

## 2017-07-17 RX ORDER — PHENDIMETRAZINE TARTRATE 105 MG/1
1 CAPSULE, EXTENDED RELEASE ORAL DAILY
Qty: 30 CAP | Refills: 0 | Status: SHIPPED | OUTPATIENT
Start: 2017-07-17 | End: 2017-08-15 | Stop reason: SDUPTHER

## 2017-07-17 NOTE — PROGRESS NOTES
Chief Complaint   Patient presents with    Weight Management    Blood Pressure Check    Medication Refill     1. Have you been to the ER, urgent care clinic since your last visit? Hospitalized since your last visit? Yes, pt has been to ED and UC since lov    2. Have you seen or consulted any other health care providers outside of the [address] [place] [address] since your last visit? Include any pap smears or colon screening. No    In the event something were to happen to you and you were unable to speak on your behalf, do you have an Advance Directive/ Living Will in place stating your wishes? YES    If yes, do we have a copy on file NO    If no, would you like information:    Pt encouraged to bring into office to be scanned into her chart.

## 2017-07-17 NOTE — MR AVS SNAPSHOT
Visit Information Date & Time Provider Department Dept. Phone Encounter #  
 7/17/2017  2:00 PM Radha Mallory DO [facility] [phone] [id number] Follow-up Instructions Return in about 1 month (around 8/17/2017) for weight, bp check, med refill . Your Appointments 8/15/2017  8:30 AM  
Office Visit with DO Otto UmanzorRoque (REKHA [place]) Appt Note: 1 MO F/U Weight, BP  
 [address] 
[address] [facility] [address]  
[phone]  
  
   
 [address] [facility] [address] [facility] [address] [facility] Upcoming Health Maintenance Date Due INFLUENZA AGE 9 TO ADULT 8/1/2017 PAP AKA CERVICAL CYTOLOGY 3/23/2018 DTaP/Tdap/Td series (2 - Td) 4/10/2027 Allergies as of 7/17/2017  Review Complete On: 7/17/2017 By: Radha Mallory DO Severity Noted Reaction Type Reactions Latex Low 03/23/2015    Itching Other Medication High 02/15/2017    Rash [address] [facility] Metformin  02/15/2017   Intolerance Diarrhea  
 severe Mirena [Levonorgestrel]  10/31/2013    Other (comments) Severe abdominal pain GALACTORRHEA Current Immunizations  Reviewed on 7/17/2017 Name Date Influenza Vaccine 10/3/2016, 10/31/2013 Reviewed by Radha Mallory DO on 7/17/2017 at  3:03 PM  
You Were Diagnosed With   
  
 Codes Comments Hypothyroidism due to acquired atrophy of thyroid    -  Primary ICD-10-CM: E03.4 ICD-9-CM: 244.8, 246.8 slightly overtreated Pure hypercholesterolemia     ICD-10-CM: E78.00 ICD-9-CM: 272.0 Hyperglycemia     ICD-10-CM: R73.9 ICD-9-CM: 790.29 Hyperinsulinemia     ICD-10-CM: E16.1 ICD-9-CM: 251.1 Obesity, Class I, BMI 30-34.9     ICD-10-CM: E66.9 ICD-9-CM: 278.00 Weight loss     ICD-10-CM: R63.4 ICD-9-CM: 783.21 5# since 06/2017, 18# since 02/2017 due to increased protein, exercise Vitals BP Pulse Temp Resp Height(growth percentile) Weight(growth percentile) 120/82 (BP 1 Location: Right arm, BP Patient Position: Sitting) 92 98.2 °F (36.8 °C) (Oral) 16 5' 3\" (1.6 m) 192 lb 12.8 oz (87.5 kg) LMP SpO2 BMI OB Status Smoking Status 07/01/2017 (Approximate) 95% 34.15 kg/m2 Having regular periods Never Smoker BMI and BSA Data Body Mass Index Body Surface Area  
 34.15 kg/m 2 1.97 m 2 Preferred Pharmacy Pharmacy Name Phone [id number]- [place], [place] - [address] [phone] Your Updated Medication List  
  
   
This list is accurate as of: 7/17/17  3:04 PM.  Always use your most recent med list.  
  
  
  
  
 albuterol 90 mcg/actuation inhaler Commonly known as:  PROVENTIL HFA, VENTOLIN HFA, PROAIR HFA Take 2 Puffs by inhalation every four (4) hours as needed for Wheezing. Indications: BRONCHOSPASM PREVENTION  
  
 ibuprofen 600 mg tablet Commonly known as:  MOTRIN  
TAKE 1 TABLET BY MOUTH EVERY 6 HOURS AS NEEDED FOR PAIN. levothyroxine 137 mcg tablet Commonly known as:  SYNTHROID Take 137 mcg by mouth Daily (before breakfast). Indications: hypothyroidism  
  
 methocarbamol 500 mg tablet Commonly known as:  ROBAXIN Take 1 Tab by mouth three (3) times daily as needed (muscle spasm). phendimetrazine tartrate 105 mg Cper Take 1 Cap by mouth daily. Max Daily Amount: 1 Cap. Indications: WEIGHT LOSS MANAGEMENT FOR OBESE PATIENT (BMI >= 30) Prescriptions Printed Refills  
 phendimetrazine tartrate 105 mg cpER 0 Sig: Take 1 Cap by mouth daily. Max Daily Amount: 1 Cap. Indications: WEIGHT LOSS MANAGEMENT FOR OBESE PATIENT (BMI >= 30) Class: Print Route: Oral  
  
We Performed the Following T4, FREE [id number] CPT(R)] TSH 3RD GENERATION [93539 CPT(R)] Follow-up Instructions  Return in about 1 month (around 8/17/2017) for weight, bp check, med refill . To-Do List   
 07/19/2017 7:30 AM  
  Appointment with Nadege Puente PT at [facility] ([phone])  
  
 07/21/2017 7:30 AM  
  Appointment with Nadege Puente PT at [facility] ([phone])  
  
 07/24/2017 7:30 AM  
  Appointment with Nadege Puente PT at [facility] ([phone])  
  
 07/26/2017 7:30 AM  
  Appointment with Nadege Puente PT at [facility] ([phone])  
  
 08/07/2017 7:30 AM  
  Appointment with Nadege Puente PT at [facility] ([phone])  
  
 08/09/2017 7:30 AM  
  Appointment with Nadege Puente PT at [facility] ([phone]) [facility] SERVICES! Dear Elder Degroot: Thank you for requesting a [employer] account. Our records indicate that you already have an active [employer] account. You can access your account anytime at [email or web address] Did you know that you can access your hospital and ER discharge instructions at any time in [employer]? You can also review all of your test results from your hospital stay or ER visit. Additional Information If you have questions, please visit the Frequently Asked Questions section of the Our[employer] website at [email or web address] Remember, OurStage is NOT to be used for urgent needs. For medical emergencies, dial 911. Now available from your iPhone and Android! Please provide this summary of care documentation to your next provider. Your primary care clinician is listed as Mukund Humphries. If you have any questions after today's visit, please call [phone].
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
## 2017-07-19 ENCOUNTER — HOSPITAL ENCOUNTER (OUTPATIENT)
Dept: PHYSICAL THERAPY | Age: 32
Discharge: HOME | End: 2017-07-19
Payer: COMMERCIAL

## 2017-07-19 PROCEDURE — 97110 THERAPEUTIC EXERCISES: CPT

## 2017-07-19 PROCEDURE — 97140 MANUAL THERAPY 1/> REGIONS: CPT

## 2017-07-19 NOTE — PROGRESS NOTES
PT DAILY TREATMENT NOTE 2-15    Patient Name: Marino Carter  Date:2017  : 1985  [x]  Patient  Verified  Payor: Malathi Colmenares / Plan: Shirley Castro / Product Type: PPO /    In time: 7:35 AM  Out time: 8:36 AM  Total Treatment Time (min): 61 minutes  Visit #: 3     Treatment Area: Low back pain [M54.5]  Left shoulder pain [M25.512]  Neck pain [M54.2]    SUBJECTIVE  Pain Level (0-10 scale): 6/10  Any medication changes, allergies to medications, adverse drug reactions, diagnosis change, or new procedure performed?: [x] No    [] Yes (see summary sheet for update)  Subjective functional status/changes:   [] No changes reported  The Pt reports that her pain is much improved and she is having less pain and more soreness. She states that her left arm pain is no longer constant and she have intermittent aching down the arm (1x every 3 days) and it is less intense as well. Her lower back is no longer causing as much discomfort and she is able to perform her work duties for longer periods without needing a break. She is complaint with her HEP. She believes that she is 80-85% improved at this time. OBJECTIVE      46 min Therapeutic Exercise:  [x] See flow sheet :   Rationale: increase ROM, increase strength, improve coordination, improve balance and increase proprioception to improve the patients ability to perform work duties and ADLs with less pain or discomfort. 15 min Manual Therapy:  STM and trigger point release to UT, scalenes, cervical paraspinals bilaterally and right parascapular and rhomboids; Right ST mobilizations   Rationale: decrease pain, increase ROM, increase tissue extensibility and decrease trigger points  to improve the patients ability to perform work duties and ADLs with less pain or discomfort.     With   [x] TE   [] TA   [] neuro   [] other: Patient Education: [x] Review HEP    [] Progressed/Changed HEP based on:   [] positioning   [] body mechanics   [] transfers   [] heat/ice application    [] other:      Other Objective/Functional Measures: None noted     Pain Level (0-10 scale) post treatment: 4/10    ASSESSMENT/Changes in Function:   The Pt tolerated the addition of the manual therapy very well and reported having tenderness, but it was not painful. She is progressing well towards her goals and will attempt to add in more strengthening exercises at next PT session. Patient will continue to benefit from skilled PT services to modify and progress therapeutic interventions, address functional mobility deficits, address ROM deficits, address strength deficits, analyze and address soft tissue restrictions, analyze and cue movement patterns, analyze and modify body mechanics/ergonomics, assess and modify postural abnormalities and instruct in home and community integration to attain remaining goals. []  See Plan of Care  []  See progress note/recertification  []  See Discharge Summary         Progress towards goals / Updated goals:  Short Term Goals: To be accomplished in 6 treatments:  1. The Pt will be independent and compliant with her HEP- progressing  2. The Pt will report a 50% reduction in pain- progressing  Long Term Goals: To be accomplished in 12 treatments:  1. The Pt will score the MCII on her FOTO survey demonstrating improved overall function (Neck 42 to 69 points; Lumbar 31 to 40 points)- progressing  2. The Pt will be able to sit >/=3 hrs with 0-2/10 pain to allow the Pt to be able to perform work duties with less pain or breaks needed- progressing  3.  The Pt will be able to stand/walk >/= 45 minutes with 0-2/10 pain to allow the Pt to be able to return to walking for exercise- progressing     PLAN  [x]  Upgrade activities as tolerated     [x]  Continue plan of care  [x]  Update interventions per flow sheet       []  Discharge due to:_  []  Other:_      Luz Mosqueda, PT 7/19/2017  8:18 AM

## 2017-07-21 ENCOUNTER — HOSPITAL ENCOUNTER (OUTPATIENT)
Dept: PHYSICAL THERAPY | Age: 32
Discharge: HOME | End: 2017-07-21
Payer: COMMERCIAL

## 2017-07-21 PROCEDURE — 97110 THERAPEUTIC EXERCISES: CPT

## 2017-07-21 PROCEDURE — 97140 MANUAL THERAPY 1/> REGIONS: CPT

## 2017-07-21 NOTE — PROGRESS NOTES
PT DAILY TREATMENT NOTE 2-15    Patient Name: Claudine Mosquera  Date:2017  : 1985  [x]  Patient  Verified  Payor: Chana Pinto / Plan: Alan Frame / Product Type: PPO /    In time: 7:30 AM  Out time: 8:28 AM  Total Treatment Time (min): 58 minutes  Visit #: 4     Treatment Area: Low back pain [M54.5]  Left shoulder pain [M25.512]  Neck pain [M54.2]    SUBJECTIVE  Pain Level (0-10 scale): 10  Any medication changes, allergies to medications, adverse drug reactions, diagnosis change, or new procedure performed?: [x] No    [] Yes (see summary sheet for update)  Subjective functional status/changes:   [] No changes reported  The Pt states that her pain is less intense overall. She states that the left-side of her neck is feeling better, but the right side is more tight because she has been protecting the left side and stretching it more frequently than the right. She states that the left parascapular region has been achy and sore, but still it is less painful than it was. OBJECTIVE    48 min Therapeutic Exercise:  [x] See flow sheet :   Rationale: increase ROM, increase strength, improve coordination, improve balance and increase proprioception to improve the patients ability to perform ADLs and work duties with less pain or discomfort. 10 min Manual Therapy:  STM and trigger point release to right UT and scalenes and left parascapluar muscles   Rationale: decrease pain, increase ROM, increase tissue extensibility and decrease trigger points  to improve the patients ability to perform work duties and ADLs with less pain or discomfort.           With   [x] TE   [] TA   [] neuro   [] other: Patient Education: [x] Review HEP    [] Progressed/Changed HEP based on:   [] positioning   [] body mechanics   [] transfers   [] heat/ice application    [] other:      Other Objective/Functional Measures: None noted     Pain Level (0-10 scale) post treatment: 2/10    ASSESSMENT/Changes in Function:   The Pt tolerated the additions to her therapy program well and had improved pain at the end of the session. She is demonstrating improved core strength and stability and did not fatigue as quickly. Patient will continue to benefit from skilled PT services to modify and progress therapeutic interventions, address functional mobility deficits, address ROM deficits, address strength deficits, analyze and address soft tissue restrictions, analyze and cue movement patterns, analyze and modify body mechanics/ergonomics, assess and modify postural abnormalities and instruct in home and community integration to attain remaining goals. []  See Plan of Care  []  See progress note/recertification  []  See Discharge Summary         Progress towards goals / Updated goals:  Short Term Goals: To be accomplished in 6 treatments:  1. The Pt will be independent and compliant with her HEP- progressing  2. The Pt will report a 50% reduction in pain- progressing  Long Term Goals: To be accomplished in 12 treatments:  1. The Pt will score the MCII on her FOTO survey demonstrating improved overall function (Neck 42 to 69 points; Lumbar 31 to 40 points)- progressing  2. The Pt will be able to sit >/=3 hrs with 0-2/10 pain to allow the Pt to be able to perform work duties with less pain or breaks needed- progressing  3.  The Pt will be able to stand/walk >/= 45 minutes with 0-2/10 pain to allow the Pt to be able to return to walking for exercise- progressing     PLAN  [x]  Upgrade activities as tolerated     [x]  Continue plan of care  [x]  Update interventions per flow sheet       []  Discharge due to:_  []  Other:_      Megan Moody, PT 7/21/2017  7:53 AM

## 2017-07-24 ENCOUNTER — HOSPITAL ENCOUNTER (OUTPATIENT)
Dept: PHYSICAL THERAPY | Age: 32
Discharge: HOME | End: 2017-07-24
Payer: COMMERCIAL

## 2017-07-24 PROCEDURE — 97140 MANUAL THERAPY 1/> REGIONS: CPT

## 2017-07-24 PROCEDURE — 97110 THERAPEUTIC EXERCISES: CPT

## 2017-07-24 NOTE — PROGRESS NOTES
PT DAILY TREATMENT NOTE 2-15    Patient Name: lVad Forte  Date:2017  : 1985  [x]  Patient  Verified  Payor: Srinivasan Burton / Plan: Miriam Cope / Product Type: PPO /    In time: 7:30 AM  Out time: 8:40 AM  Total Treatment Time (min): 80 minutes  Visit #: 5     Treatment Area: Low back pain [M54.5]  Left shoulder pain [M25.512]  Neck pain [M54.2]    SUBJECTIVE  Pain Level (0-10 scale): 10  Any medication changes, allergies to medications, adverse drug reactions, diagnosis change, or new procedure performed?: [x] No    [] Yes (see summary sheet for update)  Subjective functional status/changes:   [] No changes reported  The Pt states that she felt great over the weekend and was able to do more than she expected, but she started to have more pain and soreness last night and woke up this morning feeling very stiff. She continues to be diligent with her HEP.     OBJECTIVE     65 min Therapeutic Exercise:  [x] See flow sheet :   Rationale: increase ROM, increase strength, improve coordination, improve balance and increase proprioception to improve the patients ability to perform work duties and ADLs with less pain or discomfort.     15 min Manual Therapy:  STM to right UT and left scalenes; cervical downslides bilaterally   Rationale: decrease pain, increase ROM, increase tissue extensibility and decrease trigger points  to improve the patients ability to perform work duties and ADLs with less pain or discomfort.     With   [x] TE   [] TA   [] neuro   [] other: Patient Education: [x] Review HEP    [] Progressed/Changed HEP based on:   [] positioning   [] body mechanics   [] transfers   [] heat/ice application    [] other:       Other Objective/Functional Measures: None noted                        Pain Level (0-10 scale) post treatment: 4/10     ASSESSMENT/Changes in Function:   The Pt tolerated the additions to her therapy program well without any increased pain or discomfort. She is progressing well with her exercises and well towards her goals. Patient will continue to benefit from skilled PT services to modify and progress therapeutic interventions, address functional mobility deficits, address ROM deficits, address strength deficits, analyze and address soft tissue restrictions, analyze and cue movement patterns, analyze and modify body mechanics/ergonomics, assess and modify postural abnormalities and instruct in home and community integration to attain remaining goals.      []  See Plan of Care  []  See progress note/recertification  []  See Discharge Summary      Progress towards goals / Updated goals:  Short Term Goals: To be accomplished in 6 treatments:  1. The Pt will be independent and complaint with her HEP- progressing  2. The Pt will report a 50% reduction in pain- progressing  Long Term Goals: To be accomplished in 12 treatments:  1. The Pt will score the MCII on her FOTO survey demonstrating improved overall function (48 to 58 points)- progressing  2. The Pt will be able to sit >/= 3 hrs with 0-3/10 pain to allow the Pt to be able to return to work with less pain or discomfort- progressing  3. The Pt will be able to bend forward with 0-3/10 pain to allow the Pt to dress herself with less discomfort- progressing  4.  The Pt will be able to stand >/=30 minutes with 0-3/10 pain to allow the Pt to perform ADLs with less pain or discomfort- progressing     PLAN  [x]  Upgrade activities as tolerated     [x]  Continue plan of care  [x]  Update interventions per flow sheet       []  Discharge due to:_  []  Other:_      Nelly Mccurdy, PT 7/24/2017  11:39 AM

## 2017-07-25 ENCOUNTER — OFFICE VISIT (OUTPATIENT)
Dept: FAMILY MEDICINE CLINIC | Age: 32
End: 2017-07-25

## 2017-07-25 VITALS
HEART RATE: 96 BPM | OXYGEN SATURATION: 97 % | SYSTOLIC BLOOD PRESSURE: 108 MMHG | WEIGHT: 192.2 LBS | HEIGHT: 63 IN | DIASTOLIC BLOOD PRESSURE: 75 MMHG | RESPIRATION RATE: 18 BRPM | TEMPERATURE: 98.6 F | BODY MASS INDEX: 34.05 KG/M2

## 2017-07-25 DIAGNOSIS — M54.9 ACUTE UPPER BACK PAIN: ICD-10-CM

## 2017-07-25 DIAGNOSIS — G44.319 ACUTE POST-TRAUMATIC HEADACHE, NOT INTRACTABLE: ICD-10-CM

## 2017-07-25 DIAGNOSIS — M54.50 ACUTE BILATERAL LOW BACK PAIN WITHOUT SCIATICA: ICD-10-CM
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
## 2017-07-25 DIAGNOSIS — M25.532 LEFT WRIST PAIN: ICD-10-CM

## 2017-07-25 DIAGNOSIS — M54.2 NECK PAIN, BILATERAL POSTERIOR: Primary | ICD-10-CM

## 2017-07-25 DIAGNOSIS — R20.2 NUMBNESS AND TINGLING IN LEFT ARM: ICD-10-CM

## 2017-07-25 DIAGNOSIS — R20.0 NUMBNESS AND TINGLING IN LEFT ARM: ICD-10-CM
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
## 2017-07-26 ENCOUNTER — HOSPITAL ENCOUNTER (OUTPATIENT)
Dept: PHYSICAL THERAPY | Age: 32
Discharge: HOME | End: 2017-07-26
Payer: COMMERCIAL

## 2017-07-26 PROCEDURE — 97140 MANUAL THERAPY 1/> REGIONS: CPT

## 2017-07-26 PROCEDURE — 97110 THERAPEUTIC EXERCISES: CPT

## 2017-07-26 NOTE — PROGRESS NOTES
PT DAILY TREATMENT NOTE 2-15    Patient Name: Jc Xiao  Date:2017  : 1985  [x]  Patient  Verified  Payor: Belen Galo / Plan: Rashad Cantu / Product Type: PPO /    In time: 7:31 AM  Out time: 8:40 AM  Total Treatment Time (min): 69 minutes  Visit #: 6     Treatment Area: Low back pain [M54.5]  Left shoulder pain [M25.512]  Neck pain [M54.2]    SUBJECTIVE  Pain Level (0-10 scale): 3/10 (not pain, but stiffness)  Any medication changes, allergies to medications, adverse drug reactions, diagnosis change, or new procedure performed?: [x] No    [] Yes (see summary sheet for update)  Subjective functional status/changes:   [] No changes reported  The Pt reports that her neck is feeling much better and it is less stiff and tight this morning. She states that she had a follow up with her PCP who recommended massage therapy for her tight muscles and chiropractic care for her \"alingment being off\". She states that overall she is feeling better and believes that she is 100% improved since beginning therapy. She feels comfortable to continue independently with her HEP. OBJECTIVE    54 min Therapeutic Exercise:  [x] See flow sheet :   Rationale: increase ROM, increase strength, improve coordination, improve balance and increase proprioception to improve the patients ability to perform ADLs and work duties with less pain or discomfort. 15 min Manual Therapy:  STM to right UT and left scalenes; cervical downslides bilaterally; STM and trigger point release to left parascapular muscles   Rationale: decrease pain, increase ROM, increase tissue extensibility and decrease trigger points  to improve the patients ability to perform ADLs and work duties with less pain or discomfort.     With   [x] TE   [] TA   [] neuro   [x] other: Patient Education: [x] Review HEP    [] Progressed/Changed HEP based on:   [] positioning   [] body mechanics   [] transfers   [] heat/ice application    [x] other: Pt educated how to safely progress her HEP independently     Other Objective/Functional Measures: FOTO 73/100 (39/100 at initial evaluation)  Cervical ROM:  Flexion- 50  Extension- 47  Sidebending: right- 40, left- 37  Rotation: right- 65, left-60     Pain Level (0-10 scale) post treatment: 1/10 (not pain, but stiffness)    ASSESSMENT/Changes in Function:      []  See Plan of Care  []  See progress note/recertification  [x]  See Discharge Summary         Progress towards goals / Updated goals:  Short Term Goals: To be accomplished in 6 treatments:  1. The Pt will be independent and complaint with her HEP- met  2. The Pt will report a 50% reduction in pain- met  Long Term Goals: To be accomplished in 12 treatments:  1. The Pt will score the MCII on her FOTO survey demonstrating improved overall function (48 to 58 points)- met FOTO 73/100 at discharge  2. The Pt will be able to sit >/= 3 hrs with 0-3/10 pain to allow the Pt to be able to return to work with less pain or discomfort- met  3. The Pt will be able to bend forward with 0-3/10 pain to allow the Pt to dress herself with less discomfort- met  4.  The Pt will be able to stand >/=30 minutes with 0-3/10 pain to allow the Pt to perform ADLs with less pain or discomfort- met    PLAN  []  Upgrade activities as tolerated     []  Continue plan of care  []  Update interventions per flow sheet       [x]  Discharge due to: Pt has met all of her therapeutic goals and is complaint with HEP  []  Other:Amada Garrison, PT 7/26/2017  7:38 AM
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
## 2017-08-02 ENCOUNTER — PATIENT OUTREACH (OUTPATIENT)
Dept: OTHER | Age: 32
End: 2017-08-02
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
## 2017-08-07 ENCOUNTER — APPOINTMENT (OUTPATIENT)
Dept: PHYSICAL THERAPY | Age: 32
End: 2017-08-07

## 2017-08-09 ENCOUNTER — APPOINTMENT (OUTPATIENT)
Dept: PHYSICAL THERAPY | Age: 32
End: 2017-08-09

## 2017-08-15 ENCOUNTER — OFFICE VISIT (OUTPATIENT)
Dept: FAMILY MEDICINE CLINIC | Age: 32
End: 2017-08-15

## 2017-08-15 VITALS
RESPIRATION RATE: 16 BRPM | HEART RATE: 74 BPM | BODY MASS INDEX: 34.12 KG/M2 | TEMPERATURE: 98.6 F | SYSTOLIC BLOOD PRESSURE: 112 MMHG | WEIGHT: 192.6 LBS | HEIGHT: 63 IN | OXYGEN SATURATION: 98 % | DIASTOLIC BLOOD PRESSURE: 76 MMHG

## 2017-08-15 DIAGNOSIS — R73.9 HYPERGLYCEMIA: ICD-10-CM

## 2017-08-15 DIAGNOSIS — E55.9 VITAMIN D DEFICIENCY: ICD-10-CM

## 2017-08-15 DIAGNOSIS — E16.1 HYPERINSULINEMIA: ICD-10-CM

## 2017-08-15 DIAGNOSIS — E78.00 PURE HYPERCHOLESTEROLEMIA: ICD-10-CM

## 2017-08-15 DIAGNOSIS — K59.09 CHRONIC CONSTIPATION: Primary | ICD-10-CM

## 2017-08-15 DIAGNOSIS — E66.9 OBESITY, CLASS I, BMI 30-34.9: ICD-10-CM

## 2017-08-15 DIAGNOSIS — E03.4 HYPOTHYROIDISM DUE TO ACQUIRED ATROPHY OF THYROID: ICD-10-CM

## 2017-08-15 RX ORDER — LEVOTHYROXINE SODIUM 125 UG/1
125 TABLET ORAL
Qty: 30 TAB | Refills: 5 | Status: SHIPPED | OUTPATIENT
Start: 2017-08-15 | End: 2018-06-19 | Stop reason: SDUPTHER

## 2017-08-15 RX ORDER — PREDNISONE 10 MG/1
TABLET ORAL
Refills: 0 | COMMUNITY
Start: 2017-06-27 | End: 2017-08-15 | Stop reason: ALTCHOICE

## 2017-08-15 RX ORDER — PHENDIMETRAZINE TARTRATE 105 MG/1
1 CAPSULE, EXTENDED RELEASE ORAL DAILY
Qty: 30 CAP | Refills: 0 | Status: SHIPPED | OUTPATIENT
Start: 2017-08-15 | End: 2018-08-20 | Stop reason: ALTCHOICE
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
## 2017-08-15 NOTE — PATIENT INSTRUCTIONS
Constipation: Care Instructions  Your Care Instructions  Constipation means that you have a hard time passing stools (bowel movements). People pass stools from 3 times a day to once every 3 days. What is normal for you may be different. Constipation may occur with pain in the rectum and cramping. The pain may get worse when you try to pass stools. Sometimes there are small amounts of bright red blood on toilet paper or the surface of stools. This is because of enlarged veins near the rectum (hemorrhoids). A few changes in your diet and lifestyle may help you avoid ongoing constipation. Your doctor may also prescribe medicine to help loosen your stool. Some medicines can cause constipation. These include pain medicines and antidepressants. Tell your doctor about all the medicines you take. Your doctor may want to make a medicine change to ease your symptoms. Follow-up care is a key part of your treatment and safety. Be sure to make and go to all appointments, and call your doctor if you are having problems. It's also a good idea to know your test results and keep a list of the medicines you take. How can you care for yourself at home? · Drink plenty of fluids, enough so that your urine is light yellow or clear like water. If you have kidney, heart, or liver disease and have to limit fluids, talk with your doctor before you increase the amount of fluids you drink. · Include high-fiber foods in your diet each day. These include fruits, vegetables, beans, and whole grains. · Get at least 30 minutes of exercise on most days of the week. Walking is a good choice. You also may want to do other activities, such as running, swimming, cycling, or playing tennis or team sports. · Take a fiber supplement, such as Citrucel or Metamucil, every day. Read and follow all instructions on the label. · Schedule time each day for a bowel movement. A daily routine may help.  Take your time having your bowel movement. · Support your feet with a small step stool when you sit on the toilet. This helps flex your hips and places your pelvis in a squatting position. · Your doctor may recommend an over-the-counter laxative to relieve your constipation. Examples are Milk of Magnesia and MiraLax. Read and follow all instructions on the label. Do not use laxatives on a long-term basis. When should you call for help? Call your doctor now or seek immediate medical care if:  · You have new or worse belly pain. · You have new or worse nausea or vomiting. · You have blood in your stools. Watch closely for changes in your health, and be sure to contact your doctor if:  · Your constipation is getting worse. · You do not get better as expected. Where can you learn more? Go to http://anna-alistair.info/. Enter 21 [phone] in the search box to learn more about \"Constipation: Care Instructions. \"  Current as of: March 20, 2017  Content Version: 11.3  © 4117-7531 [employer]. Care instructions adapted under license by [employer] (which disclaims liability or warranty for this information). If you have questions about a medical condition or this instruction, always ask your healthcare professional. [place] [address] any warranty or liability for your use of this information. To relieve or prevent constipation, increase your water intake, fiber, and walking to prevent constipation. Use over the counter Smooth Move Tea ([employer] international tea section),  fiber or stool softener as needed. Consider OTC Miralax or Milk of Magnesia if no bowel movement in 3 days. Constipation: Care Instructions  Your Care Instructions  Constipation means that you have a hard time passing stools (bowel movements). People pass stools from 3 times a day to once every 3 days. What is normal for you may be different. Constipation may occur with pain in the rectum and cramping.  The pain may get worse when you try to pass stools. Sometimes there are small amounts of bright red blood on toilet paper or the surface of stools. This is because of enlarged veins near the rectum (hemorrhoids). A few changes in your diet and lifestyle may help you avoid ongoing constipation. Your doctor may also prescribe medicine to help loosen your stool. Some medicines can cause constipation. These include pain medicines and antidepressants. Tell your doctor about all the medicines you take. Your doctor may want to make a medicine change to ease your symptoms. Follow-up care is a key part of your treatment and safety. Be sure to make and go to all appointments, and call your doctor if you are having problems. It's also a good idea to know your test results and keep a list of the medicines you take. How can you care for yourself at home? · Drink plenty of fluids, enough so that your urine is light yellow or clear like water. If you have kidney, heart, or liver disease and have to limit fluids, talk with your doctor before you increase the amount of fluids you drink. · Include high-fiber foods in your diet each day. These include fruits, vegetables, beans, and whole grains. · Get at least 30 minutes of exercise on most days of the week. Walking is a good choice. You also may want to do other activities, such as running, swimming, cycling, or playing tennis or team sports. · Take a fiber supplement, such as Citrucel or Metamucil, every day. Read and follow all instructions on the label. · Schedule time each day for a bowel movement. A daily routine may help. Take your time having your bowel movement. · Support your feet with a small step stool when you sit on the toilet. This helps flex your hips and places your pelvis in a squatting position. · Your doctor may recommend an over-the-counter laxative to relieve your constipation. Examples are Milk of Magnesia and MiraLax.  Read and follow all instructions on the label. Do not use laxatives on a long-term basis. When should you call for help? Call your doctor now or seek immediate medical care if:  · You have new or worse belly pain. · You have new or worse nausea or vomiting. · You have blood in your stools. Watch closely for changes in your health, and be sure to contact your doctor if:  · Your constipation is getting worse. · You do not get better as expected. Where can you learn more? Go to http://anna-alistair.info/. Enter 21 [phone] in the search box to learn more about \"Constipation: Care Instructions. \"  Current as of: March 20, 2017  Content Version: 11.3  © 9425-0089 [employer]. Care instructions adapted under license by [employer] (which disclaims liability or warranty for this information). If you have questions about a medical condition or this instruction, always ask your healthcare professional. [place] [address] any warranty or liability for your use of this information. Starting a Weight Loss Plan: Care Instructions  Your Care Instructions  If you are thinking about losing weight, it can be hard to know where to start. Your doctor can help you set up a weight loss plan that best meets your needs. You may want to take a class on nutrition or exercise, or join a weight loss support group. If you have questions about how to make changes to your eating or exercise habits, ask your doctor about seeing a registered dietitian or an exercise specialist.  It can be a big challenge to lose weight. But you do not have to make huge changes at once. Make small changes, and stick with them. When those changes become habit, add a few more changes. If you do not think you are ready to make changes right now, try to pick a date in the future. Make an appointment to see your doctor to discuss whether the time is right for you to start a plan. Follow-up care is a key part of your treatment and safety.  Be sure to make and go to all appointments, and call your doctor if you are having problems. Its also a good idea to know your test results and keep a list of the medicines you take. How can you care for yourself at home? · Set realistic goals. Many people expect to lose much more weight than is likely. A weight loss of 5% to 10% of your body weight may be enough to improve your health. · Get family and friends involved to provide support. Talk to them about why you are trying to lose weight, and ask them to help. They can help by participating in exercise and having meals with you, even if they may be eating something different. · Find what works best for you. If you do not have time or do not like to cook, a program that offers meal replacement bars or shakes may be better for you. Or if you like to prepare meals, finding a plan that includes daily menus and recipes may be best.  · Ask your doctor about other health professionals who can help you achieve your weight loss goals. ¨ A dietitian can help you make healthy changes in your diet. ¨ An exercise specialist or  can help you develop a safe and effective exercise program.  ¨ A counselor or psychiatrist can help you cope with issues such as depression, anxiety, or family problems that can make it hard to focus on weight loss. · Consider joining a support group for people who are trying to lose weight. Your doctor can suggest groups in your area. Where can you learn more? Go to http://anna-alistair.info/. Enter Q007 in the search box to learn more about \"Starting a Weight Loss Plan: Care Instructions. \"  Current as of: October 13, 2016  Content Version: 11.3  © 7191-6687 Healthwise[employer]orporated. Care instructions adapted under license by [employer] (which disclaims liability or warranty for this information).  If you have questions about a medical condition or this instruction, always ask your healthcare professional. [address] any warranty or liability for your use of this information. WEIGHT LOSS PLAN    1. Read food labels and count calories. Goal 5633-2940 calories daily for women and 7562-4413 calories daily for men. Achieve this by decreasing caloric intake by 500 calories by weekly increments. NOTE:  1 pound = 3500 calories! Www.loseit. [employer]  Www.myFood and Beveragenesspal.eHi Car Rental  Www.[employer]  Www.Connectloud. gov  Weight watchers mobile    Calories needed to lose 1-2 pounds a week = 10 x your weight in pounds    2. Increase water intake. Per [facility] loss Program, it is important to drink 1/2 your body weight in ounces of water daily. Decrease your water consumption 2-3 hours before bedtime to prevent sleep disturbance from frequent urination. 3.  Decrease sugary beverages. Each can or glass of soda increases your risk of obesity by 60%. Can lose 10 pounds in a month by avoiding any soda. 12 oz can of soda = 140 calories, 16 oz cup of sweet tea = 200 calories    16 oz orange juice = 200 calories, 10 oz apple juice = 150 calories   32 oz sports drink = 200 calories, 16 oz punch = 240 calories   3.5 oz alcohol = 100-150 calories     4. Avoid High Fructose Corn Syrup products. This ingredient makes products highly addictive! 5. Exercise 30 minutes daily 5 days weekly, minimally. If you burn 3500 calories that equals a pound! Use a pedometer to count steps. Visit www. [employer]. [employer] for a free pedometer and diet recommendations. Your maximum heart rate = 220 - your age    Never exercise at your maximum heart rate. See handout for target heart rate. 6.  Decrease carbohydrates (white bread, pasta, rice, potatoes, sweet foods and sweet drinks like soda, tea, coffee, juice and sports drinks). Increase fiber and protein.     Goal:   calories daily of carbohydrates     Try CHROMIUM PICONATE 200 MG THREE TIMES DAILY,    to decrease Premenstrual carbohydrate cravings. 7.  Eat 3-5 small meals daily, include lots of protein (beans/legumes, nuts, lean meat, eggs) and green vegetables with each. (Breakfast, lunch, dinner with 2 healthy snacks)    8. Get proper rest 7-8 hours uninterrupted. When you get less than 6 hours, it triggers hunger by affecting your [place]:Leptin ratio and this results in weight gain. 9.  Watch your food portions. Green leafy vegetables should cover 1/2 of the plate, lean meat 1/4 of the plate, starchy vegetable 1/4 of the plate. Use smaller plates. 10.  Do not eat until you are full. Eat until you are no longer hungry. If you are not sure, try drinking a glass of water before getting your second serving of food. 11.  Do not weigh yourself daily. Wait until your next office visit. Use how you feel and  how your clothes fit as measurements of success. 12.  Address your spirituality to draw strength from above during your journey. Remember \"I am fearfully and wonderfully made. Marvelous in His eyes. \"    13. Set realistic, appropriate and achievable weight loss goals:     RECOMMENDED TARGET WEIGHT LOSS:  Initial weight loss of 5-10% of your initial body weight achieved over 6 months or a decrease of 2 BMI units. MINIMUM GOAL OF WEIGHT LOSS:  Reduce body weight and maintain a lower body weight. Prevent weight gain. RELATED WEBSITES:      Www.obesityaction. org  (and consider joining the Obesity Action Coalition for $25/year. [facility] mission is to elevated and empower those affected by obesity through education, advocacy and support. Quarterly journals included in membership fee. )    Www.[employer]. [employer]  www.Senior Whole Health.com     RELATED DIETS:  Dr. Gabbi Santos Weight loss challenge, Mediterranean diet, [facility] Watchers, Марина Diet (anti-inflammatory diet)        EXERCISE 150 MINUTES WEEKLY.   THIS CAN BE ACHIEVED BY WORKING OUT OR WALKING A MINIMUM OF 30 MINUTES FOR 5 DAYS WEEKLY. YOU CAN EXERCISE IN INCREMENTS OF 10-15 MINUTES UP TO 3 TIMES A DAY. Consider performing \"brainless exercise. \"  Choose your favorite tv program.  Five minutes before and for 5 minutes after the tv program, stretch your body. While the program is on, walk in place watching the show. When commercials come on, rest or walk around the house to do other things. When the program begins, return to walking in place. When you are able keep walking during the commercials and add light weights to your ankles or hands. By the end of the show, you would have walked 30 minutes. If you need shorter spurts of exercise, walk during the commercials and rest during the show. Drink to glasses of water prior to any exercise to prevent dehydration and to improve the results of the work out. Your RESTING HEART RATE is the number of times your heart beats per minute when you are not exerting yourself. The more fit you are, the lower your resting heart rate will be. Your MAXIMUM HEART RATE is the number of times per minute your heart pumps when it is working at 100% capacity. NEVER EXERCISE AT YOUR MAXIMUM HEART RATE. MAX HEART RATE = 220 - your age    For example, in a 48year old, the maximum heart rate is 220-50 = 170 beats per minute    Your [place] is the number of beats per minute our heart should pump during aerobic exercise. Reaching your target heart rate indicates that your body is receiving maximum cardiovascular and fat burning benefits.      If you are fit, your TARGET HEART RATE = 70-85% of your maximum Heart rate      For a 48year old with vigorous intensity physical activity,         Target heart rate= 170 bpm x .70 = 119 bpm     Target heart rate = 170 bpm x .85=  145 bpm        Therefore, your target heart rate during physical activity is 119-145      If you are not fit, TARGET HEART RATE = 50-70% of your maximum Heart rate    MODERATE CONSISTENT AEROBIC EXERCISE OR WALKING FOR AT LEAST 150 MINUTES WEEKLY IS AN ESSENTIAL PART OF ANY WEIGHT LOSS OF WEIGHT MAINTENANCE PROGRAM.     During WEIGHT LOSS PHASE, at least 75% of your exercise needs to be walking or moderate aerobic activity and 25% or 0% can be Isometric or Resistance type exercises (the latter can be deferred to the weight maintenance phase.)     During WEIGHT MAINTENANCE PHASE, at least 50% of your exercise needs to be aerobic and 50% Isometric or Resistance type exercises. BENEFITS OF MODERATE INTENSITY EXERCISE MOST DAYS OF THE WEEK:     1. Insulin Resistance improves 30-85%   2. Abdominal Fat decreases by 30%   3. Inflammatory Markers decrease by 30% (therefore pain decreases)   4. Systolic and Diastolic Blood Pressure decrease by 4 mmHg   5. HDL improves by 5%   6. Triglycerides decrease by 15%   7. Shift from small dense LDL to large dense LDL (therefore decrease Insulin Resistance)   8. Decrease coagulability                    Starting a Weight Loss Plan: Care Instructions  Your Care Instructions  If you are thinking about losing weight, it can be hard to know where to start. Your doctor can help you set up a weight loss plan that best meets your needs. You may want to take a class on nutrition or exercise, or join a weight loss support group. If you have questions about how to make changes to your eating or exercise habits, ask your doctor about seeing a registered dietitian or an exercise specialist.  It can be a big challenge to lose weight. But you do not have to make huge changes at once. Make small changes, and stick with them. When those changes become habit, add a few more changes. If you do not think you are ready to make changes right now, try to pick a date in the future. Make an appointment to see your doctor to discuss whether the time is right for you to start a plan. Follow-up care is a key part of your treatment and safety.  Be sure to make and go to all appointments, and call your doctor if you are having problems. Its also a good idea to know your test results and keep a list of the medicines you take. How can you care for yourself at home? · Set realistic goals. Many people expect to lose much more weight than is likely. A weight loss of 5% to 10% of your body weight may be enough to improve your health. · Get family and friends involved to provide support. Talk to them about why you are trying to lose weight, and ask them to help. They can help by participating in exercise and having meals with you, even if they may be eating something different. · Find what works best for you. If you do not have time or do not like to cook, a program that offers meal replacement bars or shakes may be better for you. Or if you like to prepare meals, finding a plan that includes daily menus and recipes may be best.  · Ask your doctor about other health professionals who can help you achieve your weight loss goals. ¨ A dietitian can help you make healthy changes in your diet. ¨ An exercise specialist or  can help you develop a safe and effective exercise program.  ¨ A counselor or psychiatrist can help you cope with issues such as depression, anxiety, or family problems that can make it hard to focus on weight loss. · Consider joining a support group for people who are trying to lose weight. Your doctor can suggest groups in your area. Where can you learn more? Go to http://anna-alistair.info/. Enter W677 in the search box to learn more about \"Starting a Weight Loss Plan: Care Instructions. \"  Current as of: October 13, 2016  Content Version: 11.3  © 3500-6076 [employer]. Care instructions adapted under license by [employer] (which disclaims liability or warranty for this information).  If you have questions about a medical condition or this instruction, always ask your healthcare professional. [place] disclaims any warranty or liability for your use of this information.

## 2017-08-15 NOTE — PROGRESS NOTES
HISTORY OF PRESENT ILLNESS  Jeaneth Ureña is a 32 y.o. female presents with Weight Management; Blood Pressure Check; Medication Refill (phendimetrazine); Thyroid Problem; and Labs      Agree with nurse note. Hyperinsulinemic, hyperglycemic pt with hypothyroidism, Vit D deficiency, and hypercholesterolemia presents to the office with a BP of 112/76. For hypothyroidism, she takes Synthroid 137 mcg daily x6 days weekly. She skips Sundays. She is tolerating Phendimetrazine 105 mg, well, month 2 of 3. Last prescribed on 08/15/17. She has noticed an increase of energy and decreased appetite since starting the medication. For lunch, she has been eating soup. For dinner, she has been eating protein. She drinks about 48 oz of water daily. She lost 0 pound since the last visit. Percent body fat has decreased from 40% to 39.2%, waist circumference measurement has changed from 39\" to 38.5\". Overall, patient is pleased and requests a refill of the medication today. Constipation is unchanged. She asks if she can take [address]. Patient denies fatigue, sleep disturbance, chest pain, heart palpitations, nausea, dry mouth, signs of depression. Written by aakash Mercado, as dictated by Dr. Geovanny Feldman DO.    ROS    Review of Systems negative except as noted above in HPI. ALLERGIES:    Allergies   Allergen Reactions    Latex Itching    Other Medication Rash     SPRINTEC    Metformin Diarrhea     severe    Mirena [Levonorgestrel] Other (comments)     Severe abdominal pain  GALACTORRHEA       CURRENT MEDICATIONS:    Outpatient Prescriptions Marked as Taking for the 8/15/17 encounter (Office Visit) with Matt Ventura DO   Medication Sig Dispense Refill    phendimetrazine tartrate 105 mg cpER Take 1 Cap by mouth daily. Max Daily Amount: 1 Cap.  Indications: WEIGHT LOSS MANAGEMENT FOR OBESE PATIENT (BMI >= 30) 30 Cap 0    levothyroxine (SYNTHROID) 125 mcg tablet Take 1 Tab by mouth Daily (before breakfast). Indications: hypothyroidism 30 Tab 5       PAST MEDICAL HISTORY:    Past Medical History:   Diagnosis Date    Allergy, unspecified not elsewhere classified     Ankle sprain     bilaterally. 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 Dr. Karri Brothers, OB. Carrie Smiley, Genetic Counselor   Shania Laguerre.  Elevated liver enzymes     Heart palpitations 2016    Exertional.  Dr. Sloane Paul. Negative Exercise Stress Test.    Hyperinsulinemia 2015    Hypothyroidism due to acquired atrophy of thyroid     Morbid obesity due to excess calories ([facility] [address] 2017    Obesity, Class II, BMI 35-39.9, with comorbidity 2017    Pre-diabetes 2017    Previous on Metformin for treatment.  Pure hypercholesterolemia 2013       PAST SURGICAL HISTORY:    Past Surgical History:   Procedure Laterality Date    HX  SECTION      HX DILATION AND CURETTAGE  2016    due to Cystic Hygroma/Terrell's Syndrome fetus. Dr. Galvez Phoenix   (placed),  (removed)    IUD. Dr. Kindra Wakefield.  HX TONSIL AND ADENOIDECTOMY  childhood    HX WISDOM TEETH EXTRACTION  2016    All 4 removed.        FAMILY HISTORY:    Family History   Problem Relation Age of Onset   [facility] Arthritis-osteo Mother     Hypertension Mother    [facility] Migraines Mother     Other Mother      IBS    Hypertension Maternal Uncle     Asthma Maternal Uncle     Hypertension Maternal Grandmother     Other Maternal Grandmother      diverticulosis    Arthritis-osteo Maternal Grandmother     Cancer Maternal Grandfather      prostate    Arthritis-osteo Maternal Grandfather     Other Maternal Aunt      uterine fibroids    Obesity Other        SOCIAL HISTORY:    Social History     Social History    Marital status: SINGLE     Spouse name: N/A    Number of children: 1    Years of education: N/A     Social History Main Topics  Smoking status: Never Smoker    Smokeless tobacco: Never Used    Alcohol use 0.6 oz/week     1 Glasses of wine, 0 Standard drinks or equivalent per week      Comment: [place] - 1 every 2 weeks    Drug use: No    Sexual activity: Yes     Partners: Male     Birth control/ protection: Condom     Other Topics Concern    None     Social History Narrative       IMMUNIZATIONS:    Immunization History   Administered Date(s) Administered    Influenza Vaccine 10/31/2013, 10/03/2016       PHYSICAL EXAMINATION    Vital Signs    Visit Vitals    /76 (BP 1 Location: Right arm, BP Patient Position: Sitting)    Pulse 74    Temp 98.6 °F (37 °C) (Oral)    Resp 16    Ht 5' 3\" (1.6 m)    Wt 192 lb 9.6 oz (87.4 kg)    LMP 08/06/2017 (Exact Date)    SpO2 98%    BMI 34.12 kg/m2       Weight Metrics 8/15/2017 8/15/2017 7/25/2017 7/17/2017 7/17/2017 6/27/2017 6/20/2017   Weight - 192 lb 9.6 oz 192 lb 3.2 oz - 192 lb 12.8 oz 195 lb 197 lb 5 oz   Neck Circ (inches) - - - - - - -   Waist Measure Inches 38.5 - - 39 - - -   Body Fat % 39.2 - - 40 - - -   BMI - 34.12 kg/m2 34.05 kg/m2 - 34.15 kg/m2 34.54 kg/m2 34.95 kg/m2       General appearance - Well nourished. Well appearing. Well developed. No acute distress. Obese. Head - Normocephalic. Atraumatic. Eyes - pupils equal and reactive, extraocular eye movements intact, sclera anicteric  Ears - Hearing is grossly normal bilaterally. Nose - normal and patent, no erythema, discharge or polyps   Mouth - mucous membranes moist, pharynx normal with cobblestone appearance. No erythema, white exudate or obstruction. Neck - supple. Midline trachea. No carotid bruits are noted. No thyromegaly noted. Chest - clear to auscultation bilaterally anterriorly and posteriorly. No wheezes, rales or rhonchi. Breath sounds are symmetrical and unlabored bilaterally. Heart - normal rate. Regular rhythm, normal S1, S2. No murmur.   No rubs, clicks or gallops noted.  Abdomen - soft and distended. No masses or organomegaly. No rebound, rigidity or guarding. Bowel sounds normal x 4 quadrants. No tenderness noted. Neurological - awake, alert and oriented to person, place, and time and event. Cranial nerves II through XII intact. Muscle strength is +5/5 x 4 extremities. Sensation is intact to light touch bilaterally. Steady gait. Heme/Lymph - peripheral pulses normal x 4 extremities. No peripheral edema is noted. No cervical adenopathy noted. Skin - no rashes, erythema, ecchymosis, lacerations, abrasions, suspicious moles noted. No skin tags. No acanthosis nigricans noted in the axilla or neck. Psychological -   normal behavior, speech, dress and thought processes. Good insight. Good eye contact. Normal affect. Appropriate mood. DATA REVIEWED  Lab Results   Component Value Date/Time    WBC 9.4 02/01/2017 10:00 AM    Hemoglobin (POC) 13.6 02/21/2011 03:39 PM    HGB 13.4 02/01/2017 10:00 AM    Hematocrit (POC) 40 02/21/2011 03:39 PM    HCT 41 02/01/2017 10:00 AM    PLATELET 700 31/49/3683 10:00 AM    MCV 79 02/01/2017 10:00 AM     Lab Results   Component Value Date/Time    Sodium 140 04/03/2017 08:15 AM    Potassium 4.6 04/03/2017 08:15 AM    Chloride 100 04/03/2017 08:15 AM    CO2 23 04/03/2017 08:15 AM    Anion gap 13 02/01/2017 10:00 AM    Glucose 92 04/03/2017 08:15 AM    BUN 7 04/03/2017 08:15 AM    Creatinine 0.58 04/03/2017 08:15 AM    BUN/Creatinine ratio 12 04/03/2017 08:15 AM    GFR est  04/03/2017 08:15 AM    GFR est non- 04/03/2017 08:15 AM    Calcium 9.6 04/03/2017 08:15 AM    Bilirubin, total 0.3 04/03/2017 08:15 AM    AST (SGOT) 12 04/03/2017 08:15 AM    Alk.  phosphatase 67 04/03/2017 08:15 AM    Protein, total 7.1 04/03/2017 08:15 AM    Albumin 4.4 04/03/2017 08:15 AM    Globulin 3.9 05/31/2015 07:58 PM    A-G Ratio 1.6 04/03/2017 08:15 AM    ALT (SGPT) 13 04/03/2017 08:15 AM     Lab Results   Component Value Date/Time Cholesterol, total 228 02/01/2017 10:00 AM    HDL Cholesterol 58 02/01/2017 10:00 AM    LDL, calculated 167 02/01/2017 10:00 AM    VLDL, calculated 14 05/27/2016 10:16 AM    Triglyceride 83 02/01/2017 10:00 AM     Lab Results   Component Value Date/Time    Vitamin D 25-Hydroxy 12 02/01/2017 10:00 AM    VITAMIN D, 25-HYDROXY 20.3 04/03/2017 08:15 AM       Lab Results   Component Value Date/Time    Hemoglobin A1c 6.1 05/27/2016 10:16 AM     Lab Results   Component Value Date/Time    TSH 0.439 07/10/2017 03:32 PM    TSH 9.22 02/01/2017 10:00 AM    T4, Free 1.87 07/10/2017 03:32 PM       ASSESSMENT and PLAN    ICD-10-CM ICD-9-CM    1. Chronic constipation K59.09 564.00    2. Hypothyroidism due to acquired atrophy of thyroid E03.4 244.8 levothyroxine (SYNTHROID) 125 mcg tablet     246.8 TSH 3RD GENERATION      T4, FREE   3. Pure hypercholesterolemia E78.00 272.0 LIPID PANEL   4. Vitamin D deficiency E55.9 268.9 VITAMIN D, 25 HYDROXY   5. Hyperinsulinemia E16.1 251.1 HEMOGLOBIN A1C WITH EAG      INSULIN   6. Hyperglycemia R73.9 790.29 HEMOGLOBIN A1C WITH EAG      INSULIN   7. Obesity, Class I, BMI 30-34.9 E66.9 278.00 phendimetrazine tartrate 105 mg cpER       Continue current medications and care. Ok to take Milk of Magnesia or try Smooth Move Tea for constipation. Decrease Synthroid from 137 mcg x6 days weekly to 125 mcg x6 days weekly. Recheck TSH and FT4 prior to next ov. Per BMI chart, goal weight is 136 lbs. Prescriptions written and given to patient (Synthroid 125 mcg and Phendimetrazine 105 mg, month 3 of 3.)  Medication side effects discussed. Discussed the patient's BMI with her. The BMI follow up plan is as follows: I have counseled this patient on diet and exercise regimens.    Diet recommendations:  Decrease carbohydrates (white foods including flour, white bread, white rice, white pasta, white potatoes; corn, sweet foods, sweet drinks and alcohol), increase green leafy vegetables and protein with each meal.  Avoid fried foods. Eat 3-5 small meals daily. Do not skip meals. Ok to use meal replacements up to twice daily with protein goal of 60 mg per meal.   Recommend OTC Premiere Protein bars or shakes. Increase water intake. Increase physical activity to 30 minutes daily for health benefit or 60 minutes daily to prevent weight regain, as tolerated. Physical Activity prescription:  A goal of 30 minutes physical activity daily is recommended for health benefit and at least 60 minutes daily to prevent weight regain. For weight loss, no less than 75% needs to be aerobic (i.e. Walking) and no more than 25% resistance exercising (i.e. Weight lifting). For weight maintenance phase, 50% aerobic and 50% resistance exercises. Sleep prescription:    A goal of 7-8 hours of uninterrupted sleep is recommended to turn off the Grehlin hormone to be released from the stomach and triggers appetite while promoting weight gain. Proper rest turns on Leptin hormone to be released from white adipose tissue and promotes weight loss. Counseled patient on: weight loss goals, emphasizing a 5-10% weight loss in 6-12 months and strategies. Hypothyroidism. Constipation. Relevant handouts given and discussed with patient. Immunizations noted. Advise flu vaccine between the months September to December. Praised pt for weight loss efforts and progress. Patient was offered a choice/choices in the treatment plan today. Patient expresses understanding of the plan and agrees with recommendations. Follow-up Disposition:  Return in about 1 month (around 9/15/2017) for weight, bp check, med refill . Written by aakash Mccray, as dictated by Dr. Heidi Go DO. Documentation True and Accepted by Jay Reyes. Ihsan Alves. Patient Instructions        Constipation: Care Instructions  Your Care Instructions  Constipation means that you have a hard time passing stools (bowel movements).  People pass stools from 3 times a day to once every 3 days. What is normal for you may be different. Constipation may occur with pain in the rectum and cramping. The pain may get worse when you try to pass stools. Sometimes there are small amounts of bright red blood on toilet paper or the surface of stools. This is because of enlarged veins near the rectum (hemorrhoids). A few changes in your diet and lifestyle may help you avoid ongoing constipation. Your doctor may also prescribe medicine to help loosen your stool. Some medicines can cause constipation. These include pain medicines and antidepressants. Tell your doctor about all the medicines you take. Your doctor may want to make a medicine change to ease your symptoms. Follow-up care is a key part of your treatment and safety. Be sure to make and go to all appointments, and call your doctor if you are having problems. It's also a good idea to know your test results and keep a list of the medicines you take. How can you care for yourself at home? · Drink plenty of fluids, enough so that your urine is light yellow or clear like water. If you have kidney, heart, or liver disease and have to limit fluids, talk with your doctor before you increase the amount of fluids you drink. · Include high-fiber foods in your diet each day. These include fruits, vegetables, beans, and whole grains. · Get at least 30 minutes of exercise on most days of the week. Walking is a good choice. You also may want to do other activities, such as running, swimming, cycling, or playing tennis or team sports. · Take a fiber supplement, such as Citrucel or Metamucil, every day. Read and follow all instructions on the label. · Schedule time each day for a bowel movement. A daily routine may help. Take your time having your bowel movement. · Support your feet with a small step stool when you sit on the toilet. This helps flex your hips and places your pelvis in a squatting position.   · Your doctor may recommend an over-the-counter laxative to relieve your constipation. Examples are Milk of Magnesia and MiraLax. Read and follow all instructions on the label. Do not use laxatives on a long-term basis. When should you call for help? Call your doctor now or seek immediate medical care if:  · You have new or worse belly pain. · You have new or worse nausea or vomiting. · You have blood in your stools. Watch closely for changes in your health, and be sure to contact your doctor if:  · Your constipation is getting worse. · You do not get better as expected. Where can you learn more? Go to http://anna-alistair.info/. Enter 21 [phone] in the search box to learn more about \"Constipation: Care Instructions. \"  Current as of: March 20, 2017  Content Version: 11.3  © 8301-1844 [employer]. Care instructions adapted under license by [employer] (which disclaims liability or warranty for this information). If you have questions about a medical condition or this instruction, always ask your healthcare professional. [address] any warranty or liability for your use of this information. To relieve or prevent constipation, increase your water intake, fiber, and walking to prevent constipation. Use over the counter Smooth Move Tea ([employer] international tea section),  fiber or stool softener as needed. Consider OTC Miralax or Milk of Magnesia if no bowel movement in 3 days. Constipation: Care Instructions  Your Care Instructions  Constipation means that you have a hard time passing stools (bowel movements). People pass stools from 3 times a day to once every 3 days. What is normal for you may be different. Constipation may occur with pain in the rectum and cramping. The pain may get worse when you try to pass stools. Sometimes there are small amounts of bright red blood on toilet paper or the surface of stools.  This is because of enlarged veins near the rectum (hemorrhoids). A few changes in your diet and lifestyle may help you avoid ongoing constipation. Your doctor may also prescribe medicine to help loosen your stool. Some medicines can cause constipation. These include pain medicines and antidepressants. Tell your doctor about all the medicines you take. Your doctor may want to make a medicine change to ease your symptoms. Follow-up care is a key part of your treatment and safety. Be sure to make and go to all appointments, and call your doctor if you are having problems. It's also a good idea to know your test results and keep a list of the medicines you take. How can you care for yourself at home? · Drink plenty of fluids, enough so that your urine is light yellow or clear like water. If you have kidney, heart, or liver disease and have to limit fluids, talk with your doctor before you increase the amount of fluids you drink. · Include high-fiber foods in your diet each day. These include fruits, vegetables, beans, and whole grains. · Get at least 30 minutes of exercise on most days of the week. Walking is a good choice. You also may want to do other activities, such as running, swimming, cycling, or playing tennis or team sports. · Take a fiber supplement, such as Citrucel or Metamucil, every day. Read and follow all instructions on the label. · Schedule time each day for a bowel movement. A daily routine may help. Take your time having your bowel movement. · Support your feet with a small step stool when you sit on the toilet. This helps flex your hips and places your pelvis in a squatting position. · Your doctor may recommend an over-the-counter laxative to relieve your constipation. Examples are Milk of Magnesia and MiraLax. Read and follow all instructions on the label. Do not use laxatives on a long-term basis. When should you call for help?   Call your doctor now or seek immediate medical care if:  · You have new or worse belly pain.  · You have new or worse nausea or vomiting. · You have blood in your stools. Watch closely for changes in your health, and be sure to contact your doctor if:  · Your constipation is getting worse. · You do not get better as expected. Where can you learn more? Go to http://anna-alistair.info/. Enter [phone] in the search box to learn more about \"Constipation: Care Instructions. \"  Current as of: March 20, 2017  Content Version: 11.3  © 0602-4596 [employer]. Care instructions adapted under license by [employer] (which disclaims liability or warranty for this information). If you have questions about a medical condition or this instruction, always ask your healthcare professional. [address] any warranty or liability for your use of this information. Starting a Weight Loss Plan: Care Instructions  Your Care Instructions  If you are thinking about losing weight, it can be hard to know where to start. Your doctor can help you set up a weight loss plan that best meets your needs. You may want to take a class on nutrition or exercise, or join a weight loss support group. If you have questions about how to make changes to your eating or exercise habits, ask your doctor about seeing a registered dietitian or an exercise specialist.  It can be a big challenge to lose weight. But you do not have to make huge changes at once. Make small changes, and stick with them. When those changes become habit, add a few more changes. If you do not think you are ready to make changes right now, try to pick a date in the future. Make an appointment to see your doctor to discuss whether the time is right for you to start a plan. Follow-up care is a key part of your treatment and safety. Be sure to make and go to all appointments, and call your doctor if you are having problems.  Its also a good idea to know your test results and keep a list of the medicines you take. How can you care for yourself at home? · Set realistic goals. Many people expect to lose much more weight than is likely. A weight loss of 5% to 10% of your body weight may be enough to improve your health. · Get family and friends involved to provide support. Talk to them about why you are trying to lose weight, and ask them to help. They can help by participating in exercise and having meals with you, even if they may be eating something different. · Find what works best for you. If you do not have time or do not like to cook, a program that offers meal replacement bars or shakes may be better for you. Or if you like to prepare meals, finding a plan that includes daily menus and recipes may be best.  · Ask your doctor about other health professionals who can help you achieve your weight loss goals. ¨ A dietitian can help you make healthy changes in your diet. ¨ An exercise specialist or  can help you develop a safe and effective exercise program.  ¨ A counselor or psychiatrist can help you cope with issues such as depression, anxiety, or family problems that can make it hard to focus on weight loss. · Consider joining a support group for people who are trying to lose weight. Your doctor can suggest groups in your area. Where can you learn more? Go to http://anna-alistair.info/. Enter W198 in the search box to learn more about \"Starting a Weight Loss Plan: Care Instructions. \"  Current as of: October 13, 2016  Content Version: 11.3  © 4475-8226 [employer]. Care instructions adapted under license by [employer] (which disclaims liability or warranty for this information). If you have questions about a medical condition or this instruction, always ask your healthcare professional. [place] [address] any warranty or liability for your use of this information. WEIGHT LOSS PLAN    1. Read food labels and count calories. Goal 7449-2229 calories daily for women and 7173-1124 calories daily for men. Achieve this by decreasing caloric intake by 500 calories by weekly increments. NOTE:  1 pound = 3500 calories! Www.loseit. FAB BAG  Www.myAirspannesspal.FAB BAG  Www.Elias Borges Urzeda. FAB BAG  Www.KSKTder. gov  Weight watchers mobile    Calories needed to lose 1-2 pounds a week = 10 x your weight in pounds    2. Increase water intake. Per [facility] loss Program, it is important to drink 1/2 your body weight in ounces of water daily. Decrease your water consumption 2-3 hours before bedtime to prevent sleep disturbance from frequent urination. 3.  Decrease sugary beverages. Each can or glass of soda increases your risk of obesity by 60%. Can lose 10 pounds in a month by avoiding any soda. 12 oz can of soda = 140 calories, 16 oz cup of sweet tea = 200 calories    16 oz orange juice = 200 calories, 10 oz apple juice = 150 calories   32 oz sports drink = 200 calories, 16 oz punch = 240 calories   3.5 oz alcohol = 100-150 calories     4. Avoid High Fructose Corn Syrup products. This ingredient makes products highly addictive! 5. Exercise 30 minutes daily 5 days weekly, minimally. If you burn 3500 calories that equals a pound! Use a pedometer to count steps. Visit www. [employer] for a free pedometer and diet recommendations. Your maximum heart rate = 220 - your age    Never exercise at your maximum heart rate. See handout for target heart rate. 6.  Decrease carbohydrates (white bread, pasta, rice, potatoes, sweet foods and sweet drinks like soda, tea, coffee, juice and sports drinks). Increase fiber and protein. Goal:   calories daily of carbohydrates     Try CHROMIUM PICONATE 200 MG THREE TIMES DAILY,    to decrease Premenstrual carbohydrate cravings. 7.  Eat 3-5 small meals daily, include lots of protein (beans/legumes, nuts, lean meat, eggs) and green vegetables with each.   (Breakfast, lunch, dinner with 2 healthy snacks)    8. Get proper rest 7-8 hours uninterrupted. When you get less than 6 hours, it triggers hunger by affecting your Grehlin:Leptin ratio and this results in weight gain. 9.  Watch your food portions. Green leafy vegetables should cover 1/2 of the plate, lean meat 1/4 of the plate, starchy vegetable 1/4 of the plate. Use smaller plates. 10.  Do not eat until you are full. Eat until you are no longer hungry. If you are not sure, try drinking a glass of water before getting your second serving of food. 11.  Do not weigh yourself daily. Wait until your next office visit. Use how you feel and  how your clothes fit as measurements of success. 12.  Address your spirituality to draw strength from above during your journey. Remember \"I am fearfully and wonderfully made. Marvelous in His eyes. \"    13. Set realistic, appropriate and achievable weight loss goals:     RECOMMENDED TARGET WEIGHT LOSS:  Initial weight loss of 5-10% of your initial body weight achieved over 6 months or a decrease of 2 BMI units. MINIMUM GOAL OF WEIGHT LOSS:  Reduce body weight and maintain a lower body weight. Prevent weight gain. RELATED WEBSITES:      Www.obesityaction. org  (and consider joining the [employer] for $25/year. The [address] mission is to elevated and empower those affected by obesity through education, advocacy and support. Quarterly journals included in membership fee. )    Www.[employer]. [employer]  www.[employer].com     RELATED DIETS:  Dr. David Kang Weight loss challenge, Mediterranean diet, [address] [place] [address] Watchers, Paleo Diet (anti-inflammatory diet)        EXERCISE 150 MINUTES WEEKLY. THIS CAN BE ACHIEVED BY WORKING OUT OR WALKING A MINIMUM OF 30 MINUTES FOR 5 DAYS WEEKLY. YOU CAN EXERCISE IN INCREMENTS OF 10-15 MINUTES UP TO 3 TIMES A DAY. Consider performing \"brainless exercise. \"  Choose your favorite tv program.  Five minutes before and for 5 minutes after the tv program, stretch your body. While the program is on, walk in place watching the show. When commercials come on, rest or walk around the house to do other things. When the program begins, return to walking in place. When you are able keep walking during the commercials and add light weights to your ankles or hands. By the end of the show, you would have walked 30 minutes. If you need shorter spurts of exercise, walk during the commercials and rest during the show. Drink to glasses of water prior to any exercise to prevent dehydration and to improve the results of the work out. Your RESTING HEART RATE is the number of times your heart beats per minute when you are not exerting yourself. The more fit you are, the lower your resting heart rate will be. Your MAXIMUM HEART RATE is the number of times per minute your heart pumps when it is working at 100% capacity. NEVER EXERCISE AT YOUR MAXIMUM HEART RATE. MAX HEART RATE = 220 - your age    For example, in a 48year old, the maximum heart rate is 220-50 = 170 beats per minute    Your [place] is the number of beats per minute our heart should pump during aerobic exercise. Reaching your target heart rate indicates that your body is receiving maximum cardiovascular and fat burning benefits.      If you are fit, your TARGET HEART RATE = 70-85% of your maximum Heart rate      For a 48year old with vigorous intensity physical activity,         Target heart rate= 170 bpm x .70 = 119 bpm     Target heart rate = 170 bpm x .85=  145 bpm        Therefore, your target heart rate during physical activity is 119-145      If you are not fit, TARGET HEART RATE = 50-70% of your maximum Heart rate    MODERATE CONSISTENT AEROBIC EXERCISE OR WALKING FOR AT LEAST 150 MINUTES WEEKLY IS AN ESSENTIAL PART OF ANY WEIGHT LOSS OF WEIGHT MAINTENANCE PROGRAM.     During WEIGHT LOSS PHASE, at least 75% of your exercise needs to be walking or moderate aerobic activity and 25% or 0% can be Isometric or Resistance type exercises (the latter can be deferred to the weight maintenance phase.)     During WEIGHT MAINTENANCE PHASE, at least 50% of your exercise needs to be aerobic and 50% Isometric or Resistance type exercises. BENEFITS OF MODERATE INTENSITY EXERCISE MOST DAYS OF THE WEEK:     1. Insulin Resistance improves 30-85%   2. Abdominal Fat decreases by 30%   3. Inflammatory Markers decrease by 30% (therefore pain decreases)   4. Systolic and Diastolic Blood Pressure decrease by 4 mmHg   5. HDL improves by 5%   6. Triglycerides decrease by 15%   7. Shift from small dense LDL to large dense LDL (therefore decrease Insulin Resistance)   8. Decrease coagulability                    Starting a Weight Loss Plan: Care Instructions  Your Care Instructions  If you are thinking about losing weight, it can be hard to know where to start. Your doctor can help you set up a weight loss plan that best meets your needs. You may want to take a class on nutrition or exercise, or join a weight loss support group. If you have questions about how to make changes to your eating or exercise habits, ask your doctor about seeing a registered dietitian or an exercise specialist.  It can be a big challenge to lose weight. But you do not have to make huge changes at once. Make small changes, and stick with them. When those changes become habit, add a few more changes. If you do not think you are ready to make changes right now, try to pick a date in the future. Make an appointment to see your doctor to discuss whether the time is right for you to start a plan. Follow-up care is a key part of your treatment and safety. Be sure to make and go to all appointments, and call your doctor if you are having problems. Its also a good idea to know your test results and keep a list of the medicines you take. How can you care for yourself at home?   · Set realistic goals. Many people expect to lose much more weight than is likely. A weight loss of 5% to 10% of your body weight may be enough to improve your health. · Get family and friends involved to provide support. Talk to them about why you are trying to lose weight, and ask them to help. They can help by participating in exercise and having meals with you, even if they may be eating something different. · Find what works best for you. If you do not have time or do not like to cook, a program that offers meal replacement bars or shakes may be better for you. Or if you like to prepare meals, finding a plan that includes daily menus and recipes may be best.  · Ask your doctor about other health professionals who can help you achieve your weight loss goals. ¨ A dietitian can help you make healthy changes in your diet. ¨ An exercise specialist or  can help you develop a safe and effective exercise program.  ¨ A counselor or psychiatrist can help you cope with issues such as depression, anxiety, or family problems that can make it hard to focus on weight loss. · Consider joining a support group for people who are trying to lose weight. Your doctor can suggest groups in your area. Where can you learn more? Go to http://anna-alistair.info/. Enter P102 in the search box to learn more about \"Starting a Weight Loss Plan: Care Instructions. \"  Current as of: October 13, 2016  Content Version: 11.3  © 6911-2016 [employer], [email or web address]. Care instructions adapted under license by [employer] (which disclaims liability or warranty for this information). If you have questions about a medical condition or this instruction, always ask your healthcare professional. [place] [address] any warranty or liability for your use of this information.

## 2017-08-15 NOTE — PROGRESS NOTES
Chief Complaint   Patient presents with    Weight Management    Blood Pressure Check    Medication Refill     phendimetrazine     1. Have you been to the ER, urgent care clinic since your last visit? Hospitalized since your last visit? No    2. Have you seen or consulted any other health care providers outside of the [address] [place] [address] since your last visit? Include any pap smears or colon screening. No    In the event something were to happen to you and you were unable to speak on your behalf, do you have an Advance Directive/ Living Will in place stating your wishes? NO    If yes, do we have a copy on file NO    If no, would you like information:   Pt offered and declined.

## 2018-06-19 ENCOUNTER — OFFICE VISIT (OUTPATIENT)
Dept: FAMILY MEDICINE CLINIC | Age: 33
End: 2018-06-19

## 2018-06-19 VITALS
BODY MASS INDEX: 38.95 KG/M2 | HEART RATE: 90 BPM | SYSTOLIC BLOOD PRESSURE: 119 MMHG | DIASTOLIC BLOOD PRESSURE: 84 MMHG | TEMPERATURE: 98.1 F | WEIGHT: 219.8 LBS | HEIGHT: 63 IN | OXYGEN SATURATION: 97 % | RESPIRATION RATE: 16 BRPM

## 2018-06-19 DIAGNOSIS — R73.9 HYPERGLYCEMIA: ICD-10-CM

## 2018-06-19 DIAGNOSIS — E16.1 HYPERINSULINEMIA: ICD-10-CM

## 2018-06-19 DIAGNOSIS — F43.23 ADJUSTMENT DISORDER WITH MIXED ANXIETY AND DEPRESSED MOOD: ICD-10-CM

## 2018-06-19 DIAGNOSIS — E03.4 HYPOTHYROIDISM DUE TO ACQUIRED ATROPHY OF THYROID: Primary | ICD-10-CM

## 2018-06-19 DIAGNOSIS — K59.09 CHRONIC CONSTIPATION: ICD-10-CM

## 2018-06-19 DIAGNOSIS — E55.9 VITAMIN D DEFICIENCY: ICD-10-CM

## 2018-06-19 DIAGNOSIS — E78.00 PURE HYPERCHOLESTEROLEMIA: ICD-10-CM

## 2018-06-19 DIAGNOSIS — R63.5 WEIGHT GAIN: ICD-10-CM

## 2018-06-19 RX ORDER — LEVOTHYROXINE SODIUM 125 UG/1
125 TABLET ORAL
Qty: 30 TAB | Refills: 5 | Status: SHIPPED | OUTPATIENT
Start: 2018-06-19 | End: 2019-01-04 | Stop reason: SDUPTHER

## 2018-06-19 NOTE — PATIENT INSTRUCTIONS
Learning About Sleeping Well  What does sleeping well mean? Sleeping well means getting enough sleep. How much sleep is enough varies among people. The number of hours you sleep is not as important as how you feel when you wake up. If you do not feel refreshed, you probably need more sleep. Another sign of not getting enough sleep is feeling tired during the day. The average total nightly sleep time is 7½ to 8 hours. Healthy adults may need a little more or a little less than this. Why is getting enough sleep important? Getting enough quality sleep is a basic part of good health. When your sleep suffers, your mood and your thoughts can suffer too. You may find yourself feeling more grumpy or stressed. Not getting enough sleep also can lead to serious problems, including injury, accidents, anxiety, and depression. What might cause poor sleeping? Many things can cause sleep problems, including:  · Stress. Stress can be caused by fear about a single event, such as giving a speech. Or you may have ongoing stress, such as worry about work or school. · Depression, anxiety, and other mental or emotional conditions. · Changes in your sleep habits or surroundings. This includes changes that happen where you sleep, such as noise, light, or sleeping in a different bed. It also includes changes in your sleep pattern, such as having jet lag or working a late shift. · Health problems, such as pain, breathing problems, and restless legs syndrome. · Lack of regular exercise. How can you help yourself? Here are some tips that may help you sleep more soundly and wake up feeling more refreshed. Your sleeping area  · Use your bedroom only for sleeping and sex. A bit of light reading may help you fall asleep. But if it doesn't, do your reading elsewhere in the house. Don't watch TV in bed. · Be sure your bed is big enough to stretch out comfortably, especially if you have a sleep partner.   · Keep your bedroom quiet, dark, and cool. Use curtains, blinds, or a sleep mask to block out light. To block out noise, use earplugs, soothing music, or a \"white noise\" machine. Your evening and bedtime routine  · Create a relaxing bedtime routine. You might want to take a warm shower or bath, listen to soothing music, or drink a cup of noncaffeinated tea. · Go to bed at the same time every night. And get up at the same time every morning, even if you feel tired. What to avoid  · Limit caffeine (coffee, tea, caffeinated sodas) during the day, and don't have any for at least 4 to 6 hours before bedtime. · Don't drink alcohol before bedtime. Alcohol can cause you to wake up more often during the night. · Don't smoke or use tobacco, especially in the evening. Nicotine can keep you awake. · Don't take naps during the day, especially close to bedtime. · Don't lie in bed awake for too long. If you can't fall asleep, or if you wake up in the middle of the night and can't get back to sleep within 15 minutes or so, get out of bed and go to another room until you feel sleepy. · Don't take medicine right before bed that may keep you awake or make you feel hyper or energized. Your doctor can tell you if your medicine may do this and if you can take it earlier in the day. If you can't sleep  · Imagine yourself in a peaceful, pleasant scene. Focus on the details and feelings of being in a place that is relaxing. · Get up and do a quiet or boring activity until you feel sleepy. · Don't drink any liquids after 6 p.m. if you wake up often because you have to go to the bathroom. Where can you learn more? Go to http://anna-alistair.info/. Enter U234 in the search box to learn more about \"Learning About Sleeping Well. \"  Current as of: May 12, 2017  Content Version: 11.4  © 8504-8827 Health[employer], [employer].  Care instructions adapted under license by [employer] (which disclaims liability or warranty for this information). If you have questions about a medical condition or this instruction, always ask your healthcare professional. [address] any warranty or liability for your use of this information. Hypothyroidism: Care Instructions  Your Care Instructions    You have hypothyroidism, which means that your body is not making enough thyroid hormone. This hormone helps your body use energy. If your thyroid level is low, you may feel tired, be constipated, have an increase in your blood pressure, or have dry skin or memory problems. You may also get cold easily, even when it is warm. Women with low thyroid levels may have heavy menstrual periods. A blood test to find your thyroid-stimulating hormone (TSH) level is used to check for hypothyroidism. A high TSH level may mean that you have low thyroid. When your body is not making enough thyroid hormone, TSH levels rise in an effort to make the body produce more. The treatment for hypothyroidism is to take thyroid hormone pills. You should start to feel better in 1 to 2 weeks. But it can take several months to see changes in the TSH level. You will need regular visits with your doctor to make sure you have the right dose of medicine. Most people need treatment for the rest of their lives. You will need to see your doctor regularly to have blood tests and to make sure you are doing well. Follow-up care is a key part of your treatment and safety. Be sure to make and go to all appointments, and call your doctor if you are having problems. It's also a good idea to know your test results and keep a list of the medicines you take. How can you care for yourself at home? · Take your thyroid hormone medicine exactly as prescribed. Call your doctor if you think you are having a problem with your medicine. Most people do not have side effects if they take the right amount of medicine regularly.   ¨ Take the medicine 30 minutes before breakfast, and do not take it with calcium, vitamins, or iron. ¨ Do not take extra doses of your thyroid medicine. It will not help you get better any faster, and it may cause side effects. ¨ If you forget to take a dose, do NOT take a double dose of medicine. Take your usual dose the next day. · Tell your doctor about all prescription, herbal, or over-the-counter products you take. · Take care of yourself. Eat a healthy diet, get enough sleep, and get regular exercise. When should you call for help? Call 911 anytime you think you may need emergency care. For example, call if:  ? · You passed out (lost consciousness). ? · You have severe trouble breathing. ? · You have a very slow heartbeat (less than 60 beats a minute). ? · You have a low body temperature (95°F or below). ?Call your doctor now or seek immediate medical care if:  ? · You feel tired, sluggish, or weak. ? · You have trouble remembering things or concentrating. ? · You do not begin to feel better 2 weeks after starting your medicine. ? Watch closely for changes in your health, and be sure to contact your doctor if you have any problems. Where can you learn more? Go to http://anna-alistair.info/. Enter I371 in the search box to learn more about \"Hypothyroidism: Care Instructions. \"  Current as of: May 12, 2017  Content Version: 11.4  © 2390-7442 [employer]. Care instructions adapted under license by "[employer] (which disclaims liability or warranty for this information). If you have questions about a medical condition or this instruction, always ask your healthcare professional. [address] any warranty or liability for your use of this information. Starting a Weight Loss Plan: Care Instructions  Your Care Instructions    If you are thinking about losing weight, it can be hard to know where to start. Your doctor can help you set up a weight loss plan that best meets your needs. You may want to take a class on nutrition or exercise, or join a weight loss support group. If you have questions about how to make changes to your eating or exercise habits, ask your doctor about seeing a registered dietitian or an exercise specialist.  It can be a big challenge to lose weight. But you do not have to make huge changes at once. Make small changes, and stick with them. When those changes become habit, add a few more changes. If you do not think you are ready to make changes right now, try to pick a date in the future. Make an appointment to see your doctor to discuss whether the time is right for you to start a plan. Follow-up care is a key part of your treatment and safety. Be sure to make and go to all appointments, and call your doctor if you are having problems. It's also a good idea to know your test results and keep a list of the medicines you take. How can you care for yourself at home? · Set realistic goals. Many people expect to lose much more weight than is likely. A weight loss of 5% to 10% of your body weight may be enough to improve your health. · Get family and friends involved to provide support. Talk to them about why you are trying to lose weight, and ask them to help. They can help by participating in exercise and having meals with you, even if they may be eating something different. · Find what works best for you. If you do not have time or do not like to cook, a program that offers meal replacement bars or shakes may be better for you. Or if you like to prepare meals, finding a plan that includes daily menus and recipes may be best.  · Ask your doctor about other health professionals who can help you achieve your weight loss goals. ¨ A dietitian can help you make healthy changes in your diet.   ¨ An exercise specialist or  can help you develop a safe and effective exercise program.  ¨ A counselor or psychiatrist can help you cope with issues such as depression, anxiety, or family problems that can make it hard to focus on weight loss. · Consider joining a support group for people who are trying to lose weight. Your doctor can suggest groups in your area. Where can you learn more? Go to http://anna-alistair.info/. Enter B248 in the search box to learn more about \"Starting a Weight Loss Plan: Care Instructions. \"  Current as of: October 13, 2016  Content Version: 11.4  © 3574-3390 [employer]. Care instructions adapted under license by [employer] (which disclaims liability or warranty for this information). If you have questions about a medical condition or this instruction, always ask your healthcare professional. [place] [address] any warranty or liability for your use of this information. WEIGHT LOSS PLAN    1. Read food labels and count calories. Goal 2878-0707 calories daily for women and 5023-7906 calories daily for men. Achieve this by decreasing caloric intake by 500 calories by weekly increments. NOTE:  1 pound = 3500 calories! Www.loseit. [employer]  Www.Huixiaoernesspal.[employer]  Www.[employer]  Www.[employer]. gov  Weight watchers mobile    Calories needed to lose 1-2 pounds a week = 10 x your weight in pounds    2. Increase water intake. Per [facility], it is important to drink 1/2 your body weight in ounces of water daily. Decrease your water consumption 2-3 hours before bedtime to prevent sleep disturbance from frequent urination. 3.  Decrease sugary beverages. Each can or glass of soda increases your risk of obesity by 60%. Can lose 10 pounds in a month by avoiding any soda. 12 oz can of soda = 140 calories, 16 oz cup of sweet tea = 200 calories    16 oz orange juice = 200 calories, 10 oz apple juice = 150 calories   32 oz sports drink = 200 calories, 16 oz punch = 240 calories   3.5 oz alcohol = 100-150 calories     4.   Avoid High Fructose Corn Syrup products. This ingredient makes products highly addictive! 5. Exercise 30 minutes daily 5 days weekly, minimally. If you burn 3500 calories that equals a pound! Use a pedometer to count steps. Visit www. Fliptop for a free pedometer and diet recommendations. Your maximum heart rate = 220 - your age    Never exercise at your maximum heart rate. See handout for target heart rate. 6.  Decrease carbohydrates (white bread, pasta, rice, potatoes, sweet foods and sweet drinks like soda, tea, coffee, juice and sports drinks). Increase fiber and protein. Goal:   calories daily of carbohydrates     Try CHROMIUM PICONATE 200 MG THREE TIMES DAILY,    to decrease Premenstrual carbohydrate cravings. 7.  Eat 3-5 small meals daily, include lots of protein (beans/legumes, nuts, lean meat, eggs) and green vegetables with each. (Breakfast, lunch, dinner with 2 healthy snacks)    8. Get proper rest 7-8 hours uninterrupted. When you get less than 6 hours, it triggers hunger by affecting your Grehlin:Leptin ratio and this results in weight gain. 9.  Watch your food portions. Green leafy vegetables should cover 1/2 of the plate, lean meat 1/4 of the plate, starchy vegetable 1/4 of the plate. Use smaller plates. 10.  Do not eat until you are full. Eat until you are no longer hungry. If you are not sure, try drinking a glass of water before getting your second serving of food. 11.  Do not weigh yourself daily. Wait until your next office visit. Use how you feel and  how your clothes fit as measurements of success. 12.  Address your spirituality to draw strength from above during your journey. Remember \"I am fearfully and wonderfully made. Marvelous in His eyes. \"    13.   Set realistic, appropriate and achievable weight loss goals:     RECOMMENDED TARGET WEIGHT LOSS:  Initial weight loss of 5-10% of your initial body weight achieved over 6 months or a decrease of 2 BMI units.     MINIMUM GOAL OF WEIGHT LOSS:  Reduce body weight and maintain a lower body weight. Prevent weight gain. RELATED WEBSITES:      Www.obesityaction. org  (and consider joining the Obesity Action Coalition for $25/year. The [facility] [place] [facility] mission is to elevated and empower those affected by obesity through education, advocacy and support. Quarterly journals included in membership fee. )    Www.[employer]  www.Deepclass.com     RELATED DIETS:  Dr. Jhonny Smith Weight loss challenge, Mediterranean diet, [address] [place] [address] Watchers, Paleo Diet (anti-inflammatory diet)        EXERCISE 150 MINUTES WEEKLY. THIS CAN BE ACHIEVED BY WORKING OUT OR WALKING A MINIMUM OF 30 MINUTES FOR 5 DAYS WEEKLY. YOU CAN EXERCISE IN INCREMENTS OF 10-15 MINUTES UP TO 3 TIMES A DAY. Consider performing \"brainless exercise. \"  Choose your favorite tv program.  Five minutes before and for 5 minutes after the tv program, stretch your body. While the program is on, walk in place watching the show. When commercials come on, rest or walk around the house to do other things. When the program begins, return to walking in place. When you are able keep walking during the commercials and add light weights to your ankles or hands. By the end of the show, you would have walked 30 minutes. If you need shorter spurts of exercise, walk during the commercials and rest during the show. Drink to glasses of water prior to any exercise to prevent dehydration and to improve the results of the work out. Your RESTING HEART RATE is the number of times your heart beats per minute when you are not exerting yourself. The more fit you are, the lower your resting heart rate will be. Your MAXIMUM HEART RATE is the number of times per minute your heart pumps when it is working at 100% capacity. NEVER EXERCISE AT YOUR MAXIMUM HEART RATE.     MAX HEART RATE = 220 - your age    For example, in a 48year old, the maximum heart rate is 220-50 = 170 beats per minute    Your TARGET HEART RATE is the number of beats per minute our heart should pump during aerobic exercise. Reaching your target heart rate indicates that your body is receiving maximum cardiovascular and fat burning benefits. If you are fit, your TARGET HEART RATE = 70-85% of your maximum Heart rate      For a 48year old with vigorous intensity physical activity,         Target heart rate= 170 bpm x .70 = 119 bpm     Target heart rate = 170 bpm x .85=  145 bpm        Therefore, your target heart rate during physical activity is 119-145      If you are not fit, TARGET HEART RATE = 50-70% of your maximum Heart rate    MODERATE CONSISTENT AEROBIC EXERCISE OR WALKING FOR AT LEAST 150 MINUTES WEEKLY IS AN ESSENTIAL PART OF ANY WEIGHT LOSS OF WEIGHT MAINTENANCE PROGRAM.     During WEIGHT LOSS PHASE, at least 75% of your exercise needs to be walking or moderate aerobic activity and 25% or 0% can be Isometric or Resistance type exercises (the latter can be deferred to the weight maintenance phase.)     During WEIGHT MAINTENANCE PHASE, at least 50% of your exercise needs to be aerobic and 50% Isometric or Resistance type exercises. BENEFITS OF MODERATE INTENSITY EXERCISE MOST DAYS OF THE WEEK:     1. Insulin Resistance improves 30-85%   2. Abdominal Fat decreases by 30%   3. Inflammatory Markers decrease by 30% (therefore pain decreases)   4. Systolic and Diastolic Blood Pressure decrease by 4 mmHg   5. HDL improves by 5%   6. Triglycerides decrease by 15%   7. Shift from small dense LDL to large dense LDL (therefore decrease Insulin Resistance)   8. Decrease coagulability                  Starting a Weight Loss Plan: Care Instructions  Your Care Instructions    If you are thinking about losing weight, it can be hard to know where to start. Your doctor can help you set up a weight loss plan that best meets your needs.  You may want to take a class on nutrition or exercise, or join a weight loss support group. If you have questions about how to make changes to your eating or exercise habits, ask your doctor about seeing a registered dietitian or an exercise specialist.  It can be a big challenge to lose weight. But you do not have to make huge changes at once. Make small changes, and stick with them. When those changes become habit, add a few more changes. If you do not think you are ready to make changes right now, try to pick a date in the future. Make an appointment to see your doctor to discuss whether the time is right for you to start a plan. Follow-up care is a key part of your treatment and safety. Be sure to make and go to all appointments, and call your doctor if you are having problems. It's also a good idea to know your test results and keep a list of the medicines you take. How can you care for yourself at home? · Set realistic goals. Many people expect to lose much more weight than is likely. A weight loss of 5% to 10% of your body weight may be enough to improve your health. · Get family and friends involved to provide support. Talk to them about why you are trying to lose weight, and ask them to help. They can help by participating in exercise and having meals with you, even if they may be eating something different. · Find what works best for you. If you do not have time or do not like to cook, a program that offers meal replacement bars or shakes may be better for you. Or if you like to prepare meals, finding a plan that includes daily menus and recipes may be best.  · Ask your doctor about other health professionals who can help you achieve your weight loss goals. ¨ A dietitian can help you make healthy changes in your diet.   ¨ An exercise specialist or  can help you develop a safe and effective exercise program.  ¨ A counselor or psychiatrist can help you cope with issues such as depression, anxiety, or family problems that can make it hard to focus on weight loss. · Consider joining a support group for people who are trying to lose weight. Your doctor can suggest groups in your area. Where can you learn more? Go to http://anna-alistair.info/. Enter R809 in the search box to learn more about \"Starting a Weight Loss Plan: Care Instructions. \"  Current as of: October 13, 2016  Content Version: 11.4  © 5203-4232 [employer]. Care instructions adapted under license by [employer] (which disclaims liability or warranty for this information). If you have questions about a medical condition or this instruction, always ask your healthcare professional. [address] any warranty or liability for your use of this information.

## 2018-06-19 NOTE — PROGRESS NOTES
HISTORY OF PRESENT ILLNESS  Raudel Tierney is a 28 y.o. female presents with Weight Management (weight check and med refill); Labs; Thyroid Problem; and Stress    Agree with nurse note. Pt with hyperglycemia, vit d deficiency, hyperinsulinemia, hypercholesterolemia, presents to the office with a BP of 119/84. Patient weighs 219 lb, up 27 lbs from 8/15/2017, still down from highest weight of 225 in 03/2015. Her goal weight is 170 lb. Body fat percentage has changed from 39.2% to 38.8%. Waist measurement increased from 38.5\" to 43.5\". She has a smart watch to keep track of her steps, heart rate, calorie burn, etc. She admits to not exercising since 12/2017 due to stress at work. She has been under a lot of stress. In 02/2018, her job laid her off then offered her a new position. This job ended up falling through, so she had leave [facility] to find another job. She has found a new job, which she likes. At the end of 03/2018, her son was in a fight at school and was beat up by multiple members of his class. She has hx of adjustment disorder. She denies SI/HI. Stable. Stressors: job loss, new job, and son being bullied at school    She tends to not eat and then overeat or binge while watching Netflix. She knows she doesn't drink enough water and feels dehydrated. She notes constipation when she does not drink enough water. She sleeps from 9:45 to 2 am, falls back asleep and wakes up at 4 am.     She has a hx of hypothyroidism and admits to not taking her thyroid medication regularly and last took it in 01/2018. TSH was 0.439 , FT4 was 1.87 in 07/10/2017. Written by aakash Bonner, as dictated by Dr. Felicitas Cortez DO.    ROS    Review of Systems negative except as noted above in HPI.     ALLERGIES:    Allergies   Allergen Reactions    Latex Itching    Other Medication Rash     SPRINTEC    Metformin Diarrhea     severe    Mirena [Levonorgestrel] Other (comments)     Severe abdominal pain  GALACTORRHEA       CURRENT MEDICATIONS:    Outpatient Prescriptions Marked as Taking for the 18 encounter (Office Visit) with Sterling Lynn,    Medication Sig Dispense Refill    levothyroxine (SYNTHROID) 125 mcg tablet Take 1 Tab by mouth Daily (before breakfast). Indications: hypothyroidism 30 Tab 5       PAST MEDICAL HISTORY:    Past Medical History:   Diagnosis Date    Allergy, unspecified not elsewhere classified     Ankle sprain     bilaterally. 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 Dr. Candelario Musa, OB. [facility] Floor, Genetic Counselor   Ian Owen.  Elevated liver enzymes     Heart palpitations 2016    Exertional.  Dr. Guzman [facility]. Negative Exercise Stress Test.    Hyperinsulinemia 2015    Hypothyroidism due to acquired atrophy of thyroid     Morbid obesity due to excess calories ([facility] [address] 2017    Obesity, Class II, BMI 35-39.9, with comorbidity 2017    Pre-diabetes 2017    Previous on Metformin for treatment.  Pure hypercholesterolemia 2013       PAST SURGICAL HISTORY:    Past Surgical History:   Procedure Laterality Date    HX  SECTION      HX DILATION AND CURETTAGE  2016    due to Cystic Hygroma/Terrell's Syndrome fetus. Dr. Irais Zhou   (placed),  (removed)    IUD. Dr. Deonna Pérez.  HX TONSIL AND ADENOIDECTOMY  childhood    HX WISDOM TEETH EXTRACTION  2016    All 4 removed.        FAMILY HISTORY:    Family History   Problem Relation Age of Onset   Pineda Bogus Arthritis-osteo Mother     Hypertension Mother    Pineda Bogus Migraines Mother     Other Mother      IBS    Hypertension Maternal Uncle     Asthma Maternal Uncle     Hypertension Maternal Grandmother     Other Maternal Grandmother      diverticulosis    Arthritis-osteo Maternal Grandmother     Cancer Maternal Grandfather      prostate    Arthritis-osteo Maternal Grandfather     Other Maternal Aunt      uterine fibroids    Obesity Other        SOCIAL HISTORY:    Social History     Social History    Marital status: SINGLE     Spouse name: N/A    Number of children: 1    Years of education: N/A     Social History Main Topics    Smoking status: Never Smoker    Smokeless tobacco: Never Used    Alcohol use 0.6 oz/week     1 Glasses of wine, 0 Standard drinks or equivalent per week      Comment: [place] - 1 every 2 weeks    Drug use: No    Sexual activity: Yes     Partners: Male     Birth control/ protection: Condom     Other Topics Concern    None     Social History Narrative       IMMUNIZATIONS:    Immunization History   Administered Date(s) Administered    Influenza Vaccine 10/31/2013, 10/03/2016         PHYSICAL EXAMINATION    Vital Signs    Visit Vitals    /84 (BP 1 Location: Right arm, BP Patient Position: Sitting)    Pulse 90    Temp 98.1 °F (36.7 °C) (Oral)    Resp 16    Ht 5' 3\" (1.6 m)    Wt 219 lb 12.8 oz (99.7 kg)    SpO2 97%    BMI 38.94 kg/m2       Weight Metrics 6/19/2018 6/19/2018 8/15/2017 8/15/2017 7/25/2017 7/17/2017 7/17/2017   Weight - 219 lb 12.8 oz - 192 lb 9.6 oz 192 lb 3.2 oz - 192 lb 12.8 oz   Neck Circ (inches) - - - - - - -   Waist Measure Inches 43.5 - 38.5 - - 39 -   Body Fat % 38.8 - 39.2 - - 40 -   BMI - 38.94 kg/m2 - 34.12 kg/m2 34.05 kg/m2 - 34.15 kg/m2       General appearance - Well nourished. Well appearing. Well developed. No acute distress. Obese. Head - Normocephalic. Atraumatic. Eyes - pupils equal and reactive. Extraocular eye movements intact. Sclera anicteric. Mildly injected sclera. Ears - Hearing is grossly normal bilaterally. Nose - normal and patent. No polyps noted. No erythema. No discharge. Mouth - mucous membranes with adequate moisture. Posterior pharynx normal with cobblestone appearance. No erythema, white exudate or obstruction. Neck - supple. Midline trachea.   No carotid bruits noted bilaterally. No thyromegaly noted. Chest - clear to auscultation bilaterally anteriorly and posteriorly. No wheezes. No rales or rhonchi. Breath sounds are symmetrical bilaterally. Unlabored respirations. Heart - normal rate. Regular rhythm. Normal S1, S2. No murmur noted. No rubs, clicks or gallops noted. Abdomen - soft and distended. No masses or organomegaly. No rebound, rigidity or guarding. Bowel sounds normal x 4 quadrants. No tenderness noted. Neurological - awake, alert and oriented to person, place, and time and event. Cranial nerves II through XII intact. Clear speech. Muscle strength is +5/5 x 4 extremities. Sensation is intact to light touch bilaterally. Steady gait. Heme/Lymph - peripheral pulses normal x 4 extremities. No peripheral edema is noted. Musculoskeletal - Intact x 4 extremities. Full ROM x 4 extremities. No pain with movement. Back exam - normal range of motion. No pain on palpation of the spinous processes in the cervical, thoracic, lumbar, sacral regions. No CVA tenderness. Skin - no rashes, erythema, ecchymosis, lacerations, abrasions, suspicious moles noted  Psychological -   normal behavior, dress and thought processes. Good insight. Good eye contact. Normal affect. Appropriate mood. Normal speech.       DATA REVIEWED    Lab Results   Component Value Date/Time    WBC 9.4 02/01/2017 10:00 AM    Hemoglobin (POC) 13.6 02/21/2011 03:39 PM    HGB 13.4 02/01/2017 10:00 AM    Hematocrit (POC) 40 02/21/2011 03:39 PM    HCT 41 02/01/2017 10:00 AM    PLATELET 912 (H) 16/56/3408 10:00 AM    MCV 79 (L) 02/01/2017 10:00 AM     Lab Results   Component Value Date/Time    Sodium 140 04/03/2017 08:15 AM    Potassium 4.6 04/03/2017 08:15 AM    Chloride 100 04/03/2017 08:15 AM    CO2 23 04/03/2017 08:15 AM    Anion gap 13 02/01/2017 10:00 AM    Glucose 92 04/03/2017 08:15 AM    BUN 7 04/03/2017 08:15 AM    Creatinine 0.58 04/03/2017 08:15 AM    BUN/Creatinine ratio 12 04/03/2017 08:15 AM    GFR est  04/03/2017 08:15 AM    GFR est non- 04/03/2017 08:15 AM    Calcium 9.6 04/03/2017 08:15 AM    Bilirubin, total 0.3 04/03/2017 08:15 AM    AST (SGOT) 12 04/03/2017 08:15 AM    Alk. phosphatase 67 04/03/2017 08:15 AM    Protein, total 7.1 04/03/2017 08:15 AM    Albumin 4.4 04/03/2017 08:15 AM    Globulin 3.9 05/31/2015 07:58 PM    A-G Ratio 1.6 04/03/2017 08:15 AM    ALT (SGPT) 13 04/03/2017 08:15 AM     Lab Results   Component Value Date/Time    Cholesterol, total 228 (H) 02/01/2017 10:00 AM    HDL Cholesterol 58 02/01/2017 10:00 AM    LDL, calculated 167 (H) 02/01/2017 10:00 AM    VLDL, calculated 14 05/27/2016 10:16 AM    Triglyceride 83 02/01/2017 10:00 AM     Lab Results   Component Value Date/Time    Vitamin D 25-Hydroxy 12 (L) 02/01/2017 10:00 AM    VITAMIN D, 25-HYDROXY 20.3 (L) 04/03/2017 08:15 AM       Lab Results   Component Value Date/Time    Hemoglobin A1c 6.1 (H) 05/27/2016 10:16 AM     Lab Results   Component Value Date/Time    TSH 0.439 (L) 07/10/2017 03:32 PM    TSH 9.22 (H) 02/01/2017 10:00 AM    T4, Free 1.87 (H) 07/10/2017 03:32 PM       ASSESSMENT and PLAN      ICD-10-CM ICD-9-CM    1. Hypothyroidism due to acquired atrophy of thyroid E03.4 244.8 TSH 3RD GENERATION     246.8 T4, FREE      levothyroxine (SYNTHROID) 125 mcg tablet    uncontrolled since off Synthroid 01/2018   2. Pure hypercholesterolemia E78.00 272.0 LIPID PANEL      METABOLIC PANEL, COMPREHENSIVE      TSH 3RD GENERATION      T4, FREE      INSULIN   3. Hyperglycemia L98.9 869.45 METABOLIC PANEL, COMPREHENSIVE      HEMOGLOBIN A1C WITH EAG      INSULIN   4. Vitamin D deficiency E55.9 268.9 VITAMIN D, 25 HYDROXY   5. Chronic constipation K59.09 564.00    6. Hyperinsulinemia W32.6 081.9 METABOLIC PANEL, COMPREHENSIVE      HEMOGLOBIN A1C WITH EAG   7. Weight gain R63.5 783.1 TSH 3RD GENERATION    27# since 08/2017 due to stress vs thyroid vs other   8.  Obesity, Class II, BMI 35-39.9, with comorbidity E66.9 278.00    9. Adjustment disorder with mixed anxiety and depressed mood F43.23 309.28     due to job stress vs single mom stress w child's school vs starting a new job 04/30/18 vs other, stable       Discussed the patient's BMI with her. The BMI follow up plan is as follows: I have counseled this patient on diet and exercise regimens. Decrease carbohydrates (white foods, sweet foods, sweet drinks and alcohol), increase green leafy vegetables and protein (lean meats and beans) with each meal.  Avoid fried foods. Eat 3-5 small meals daily. Do not skip meals. Increase water intake. Increase physical activity to 30 minutes daily for health benefit or 60 minutes daily to prevent weight regain, as tolerated. Get 7-8 hours uninterrupted sleep nightly. Chart reviewed and updated. Continue current medications and care. Advised pt to change bedtime to 11 pm and use Sleepytime or Chamomile tea or OTC sleep aide if needed. Restart Synthroid 125 mcg tablet every day. Avoid heartburn meds and vitamins within 4 hours of taking it. Prescriptions written and sent to pharmacy; medication side effects discussed. Synthroid 125 mcg. Most recent tests reviewed. Recheck pertinent fasting labs. Counseled patient on health concerns:  Weight management, thyroid, sleep hygiene, and stressors. Relevant handouts given and discussed with patient. Immunizations noted. Offered empathy, support, legitimation, prayers, partnership to patient. Praised patient for progress. Follow-up Disposition:  Return in about 2 months (around 8/19/2018) for Thyroid, weight, results. Patient was offered a choice/choices in the treatment plan today. Patient expresses understanding of the plan and agrees with recommendations. Written by aakash Ambrosio, as dictated by Dr. Dheeraj Conley DO. Documentation True and Accepted by Brooks Ramirez. Sam Gar.       Patient Instructions          Learning About Sleeping Well  What does sleeping well mean? Sleeping well means getting enough sleep. How much sleep is enough varies among people. The number of hours you sleep is not as important as how you feel when you wake up. If you do not feel refreshed, you probably need more sleep. Another sign of not getting enough sleep is feeling tired during the day. The average total nightly sleep time is 7½ to 8 hours. Healthy adults may need a little more or a little less than this. Why is getting enough sleep important? Getting enough quality sleep is a basic part of good health. When your sleep suffers, your mood and your thoughts can suffer too. You may find yourself feeling more grumpy or stressed. Not getting enough sleep also can lead to serious problems, including injury, accidents, anxiety, and depression. What might cause poor sleeping? Many things can cause sleep problems, including:  · Stress. Stress can be caused by fear about a single event, such as giving a speech. Or you may have ongoing stress, such as worry about work or school. · Depression, anxiety, and other mental or emotional conditions. · Changes in your sleep habits or surroundings. This includes changes that happen where you sleep, such as noise, light, or sleeping in a different bed. It also includes changes in your sleep pattern, such as having jet lag or working a late shift. · Health problems, such as pain, breathing problems, and restless legs syndrome. · Lack of regular exercise. How can you help yourself? Here are some tips that may help you sleep more soundly and wake up feeling more refreshed. Your sleeping area  · Use your bedroom only for sleeping and sex. A bit of light reading may help you fall asleep. But if it doesn't, do your reading elsewhere in the house. Don't watch TV in bed. · Be sure your bed is big enough to stretch out comfortably, especially if you have a sleep partner. · Keep your bedroom quiet, dark, and cool. Use curtains, blinds, or a sleep mask to block out light. To block out noise, use earplugs, soothing music, or a \"white noise\" machine. Your evening and bedtime routine  · Create a relaxing bedtime routine. You might want to take a warm shower or bath, listen to soothing music, or drink a cup of noncaffeinated tea. · Go to bed at the same time every night. And get up at the same time every morning, even if you feel tired. What to avoid  · Limit caffeine (coffee, tea, caffeinated sodas) during the day, and don't have any for at least 4 to 6 hours before bedtime. · Don't drink alcohol before bedtime. Alcohol can cause you to wake up more often during the night. · Don't smoke or use tobacco, especially in the evening. Nicotine can keep you awake. · Don't take naps during the day, especially close to bedtime. · Don't lie in bed awake for too long. If you can't fall asleep, or if you wake up in the middle of the night and can't get back to sleep within 15 minutes or so, get out of bed and go to another room until you feel sleepy. · Don't take medicine right before bed that may keep you awake or make you feel hyper or energized. Your doctor can tell you if your medicine may do this and if you can take it earlier in the day. If you can't sleep  · Imagine yourself in a peaceful, pleasant scene. Focus on the details and feelings of being in a place that is relaxing. · Get up and do a quiet or boring activity until you feel sleepy. · Don't drink any liquids after 6 p.m. if you wake up often because you have to go to the bathroom. Where can you learn more? Go to http://anna-alistair.info/. Enter H483 in the search box to learn more about \"Learning About Sleeping Well. \"  Current as of: May 12, 2017  Content Version: 11.4  © 3479-3252 Healthwise, [employer]. Care instructions adapted under license by [employer] (which disclaims liability or warranty for this information).  If you have questions about a medical condition or this instruction, always ask your healthcare professional. [address] any warranty or liability for your use of this information. Hypothyroidism: Care Instructions  Your Care Instructions    You have hypothyroidism, which means that your body is not making enough thyroid hormone. This hormone helps your body use energy. If your thyroid level is low, you may feel tired, be constipated, have an increase in your blood pressure, or have dry skin or memory problems. You may also get cold easily, even when it is warm. Women with low thyroid levels may have heavy menstrual periods. A blood test to find your thyroid-stimulating hormone (TSH) level is used to check for hypothyroidism. A high TSH level may mean that you have low thyroid. When your body is not making enough thyroid hormone, TSH levels rise in an effort to make the body produce more. The treatment for hypothyroidism is to take thyroid hormone pills. You should start to feel better in 1 to 2 weeks. But it can take several months to see changes in the TSH level. You will need regular visits with your doctor to make sure you have the right dose of medicine. Most people need treatment for the rest of their lives. You will need to see your doctor regularly to have blood tests and to make sure you are doing well. Follow-up care is a key part of your treatment and safety. Be sure to make and go to all appointments, and call your doctor if you are having problems. It's also a good idea to know your test results and keep a list of the medicines you take. How can you care for yourself at home? · Take your thyroid hormone medicine exactly as prescribed. Call your doctor if you think you are having a problem with your medicine. Most people do not have side effects if they take the right amount of medicine regularly.   ¨ Take the medicine 30 minutes before breakfast, and do not take it with calcium, vitamins, or iron. ¨ Do not take extra doses of your thyroid medicine. It will not help you get better any faster, and it may cause side effects. ¨ If you forget to take a dose, do NOT take a double dose of medicine. Take your usual dose the next day. · Tell your doctor about all prescription, herbal, or over-the-counter products you take. · Take care of yourself. Eat a healthy diet, get enough sleep, and get regular exercise. When should you call for help? Call 911 anytime you think you may need emergency care. For example, call if:  ? · You passed out (lost consciousness). ? · You have severe trouble breathing. ? · You have a very slow heartbeat (less than 60 beats a minute). ? · You have a low body temperature (95°F or below). ?Call your doctor now or seek immediate medical care if:  ? · You feel tired, sluggish, or weak. ? · You have trouble remembering things or concentrating. ? · You do not begin to feel better 2 weeks after starting your medicine. ? Watch closely for changes in your health, and be sure to contact your doctor if you have any problems. Where can you learn more? Go to http://anna-alistair.info/. Enter X451 in the search box to learn more about \"Hypothyroidism: Care Instructions. \"  Current as of: May 12, 2017  Content Version: 11.4  © 9252-6853 [employer]. Care instructions adapted under license by [employer] (which disclaims liability or warranty for this information). If you have questions about a medical condition or this instruction, always ask your healthcare professional. [address] any warranty or liability for your use of this information. Starting a Weight Loss Plan: Care Instructions  Your Care Instructions    If you are thinking about losing weight, it can be hard to know where to start. Your doctor can help you set up a weight loss plan that best meets your needs.  You may want to take a class on nutrition or exercise, or join a weight loss support group. If you have questions about how to make changes to your eating or exercise habits, ask your doctor about seeing a registered dietitian or an exercise specialist.  It can be a big challenge to lose weight. But you do not have to make huge changes at once. Make small changes, and stick with them. When those changes become habit, add a few more changes. If you do not think you are ready to make changes right now, try to pick a date in the future. Make an appointment to see your doctor to discuss whether the time is right for you to start a plan. Follow-up care is a key part of your treatment and safety. Be sure to make and go to all appointments, and call your doctor if you are having problems. It's also a good idea to know your test results and keep a list of the medicines you take. How can you care for yourself at home? · Set realistic goals. Many people expect to lose much more weight than is likely. A weight loss of 5% to 10% of your body weight may be enough to improve your health. · Get family and friends involved to provide support. Talk to them about why you are trying to lose weight, and ask them to help. They can help by participating in exercise and having meals with you, even if they may be eating something different. · Find what works best for you. If you do not have time or do not like to cook, a program that offers meal replacement bars or shakes may be better for you. Or if you like to prepare meals, finding a plan that includes daily menus and recipes may be best.  · Ask your doctor about other health professionals who can help you achieve your weight loss goals. ¨ A dietitian can help you make healthy changes in your diet.   ¨ An exercise specialist or  can help you develop a safe and effective exercise program.  ¨ A counselor or psychiatrist can help you cope with issues such as depression, anxiety, or family problems that can make it hard to focus on weight loss. · Consider joining a support group for people who are trying to lose weight. Your doctor can suggest groups in your area. Where can you learn more? Go to http://anna-alistair.info/. Enter K553 in the search box to learn more about \"Starting a Weight Loss Plan: Care Instructions. \"  Current as of: October 13, 2016  Content Version: 11.4  © 8177-4296 [employer]. Care instructions adapted under license by [employer] (which disclaims liability or warranty for this information). If you have questions about a medical condition or this instruction, always ask your healthcare professional. [place] [address] any warranty or liability for your use of this information. WEIGHT LOSS PLAN    1. Read food labels and count calories. Goal 4448-2092 calories daily for women and 2641-7426 calories daily for men. Achieve this by decreasing caloric intake by 500 calories by weekly increments. NOTE:  1 pound = 3500 calories! Www.Patient Communicatorit. AppDirect  Www.[employer] Therapeuticspal.AppDirect  Www.Nan[employer]ch  Www.writewith. gov  Weight watchers mobile    Calories needed to lose 1-2 pounds a week = 10 x your weight in pounds    2. Increase water intake. Per [employer], it is important to drink 1/2 your body weight in ounces of water daily. Decrease your water consumption 2-3 hours before bedtime to prevent sleep disturbance from frequent urination. 3.  Decrease sugary beverages. Each can or glass of soda increases your risk of obesity by 60%. Can lose 10 pounds in a month by avoiding any soda. 12 oz can of soda = 140 calories, 16 oz cup of sweet tea = 200 calories    16 oz orange juice = 200 calories, 10 oz apple juice = 150 calories   32 oz sports drink = 200 calories, 16 oz punch = 240 calories   3.5 oz alcohol = 100-150 calories     4. Avoid High Fructose Corn Syrup products.   This ingredient makes products highly addictive! 5. Exercise 30 minutes daily 5 days weekly, minimally. If you burn 3500 calories that equals a pound! Use a pedometer to count steps. Visit www. Infinite.ly for a free pedometer and diet recommendations. Your maximum heart rate = 220 - your age    Never exercise at your maximum heart rate. See handout for target heart rate. 6.  Decrease carbohydrates (white bread, pasta, rice, potatoes, sweet foods and sweet drinks like soda, tea, coffee, juice and sports drinks). Increase fiber and protein. Goal:   calories daily of carbohydrates     Try CHROMIUM PICONATE 200 MG THREE TIMES DAILY,    to decrease Premenstrual carbohydrate cravings. 7.  Eat 3-5 small meals daily, include lots of protein (beans/legumes, nuts, lean meat, eggs) and green vegetables with each. (Breakfast, lunch, dinner with 2 healthy snacks)    8. Get proper rest 7-8 hours uninterrupted. When you get less than 6 hours, it triggers hunger by affecting your [place]:Leptin ratio and this results in weight gain. 9.  Watch your food portions. Green leafy vegetables should cover 1/2 of the plate, lean meat 1/4 of the plate, starchy vegetable 1/4 of the plate. Use smaller plates. 10.  Do not eat until you are full. Eat until you are no longer hungry. If you are not sure, try drinking a glass of water before getting your second serving of food. 11.  Do not weigh yourself daily. Wait until your next office visit. Use how you feel and  how your clothes fit as measurements of success. 12.  Address your spirituality to draw strength from above during your journey. Remember \"I am fearfully and wonderfully made. Marvelous in His eyes. \"    13. Set realistic, appropriate and achievable weight loss goals:     RECOMMENDED TARGET WEIGHT LOSS:  Initial weight loss of 5-10% of your initial body weight achieved over 6 months or a decrease of 2 BMI units.      MINIMUM GOAL OF WEIGHT LOSS:  Reduce body weight and maintain a lower body weight. Prevent weight gain. RELATED WEBSITES:      Www.obesityaction. org  (and consider joining the [employer] for $25/year. The [facility] mission is to elevated and empower those affected by obesity through education, advocacy and support. Quarterly journals included in membership fee. )    Www.[employer]  www.[employer].com     RELATED DIETS:  Dr. Lester Ward Weight loss challenge, Mediterranean diet, [address] [place] [address] Watchers, Paleo Diet (anti-inflammatory diet)        EXERCISE 150 MINUTES WEEKLY. THIS CAN BE ACHIEVED BY WORKING OUT OR WALKING A MINIMUM OF 30 MINUTES FOR 5 DAYS WEEKLY. YOU CAN EXERCISE IN INCREMENTS OF 10-15 MINUTES UP TO 3 TIMES A DAY. Consider performing \"brainless exercise. \"  Choose your favorite tv program.  Five minutes before and for 5 minutes after the tv program, stretch your body. While the program is on, walk in place watching the show. When commercials come on, rest or walk around the house to do other things. When the program begins, return to walking in place. When you are able keep walking during the commercials and add light weights to your ankles or hands. By the end of the show, you would have walked 30 minutes. If you need shorter spurts of exercise, walk during the commercials and rest during the show. Drink to glasses of water prior to any exercise to prevent dehydration and to improve the results of the work out. Your RESTING HEART RATE is the number of times your heart beats per minute when you are not exerting yourself. The more fit you are, the lower your resting heart rate will be. Your MAXIMUM HEART RATE is the number of times per minute your heart pumps when it is working at 100% capacity. NEVER EXERCISE AT YOUR MAXIMUM HEART RATE.     MAX HEART RATE = 220 - your age    For example, in a 48year old, the maximum heart rate is 220-50 = 170 beats per minute    Your TARGET HEART RATE is the number of beats per minute our heart should pump during aerobic exercise. Reaching your target heart rate indicates that your body is receiving maximum cardiovascular and fat burning benefits. If you are fit, your TARGET HEART RATE = 70-85% of your maximum Heart rate      For a 48year old with vigorous intensity physical activity,         Target heart rate= 170 bpm x .70 = 119 bpm     Target heart rate = 170 bpm x .85=  145 bpm        Therefore, your target heart rate during physical activity is 119-145      If you are not fit, TARGET HEART RATE = 50-70% of your maximum Heart rate    MODERATE CONSISTENT AEROBIC EXERCISE OR WALKING FOR AT LEAST 150 MINUTES WEEKLY IS AN ESSENTIAL PART OF ANY WEIGHT LOSS OF WEIGHT MAINTENANCE PROGRAM.     During WEIGHT LOSS PHASE, at least 75% of your exercise needs to be walking or moderate aerobic activity and 25% or 0% can be Isometric or Resistance type exercises (the latter can be deferred to the weight maintenance phase.)     During WEIGHT MAINTENANCE PHASE, at least 50% of your exercise needs to be aerobic and 50% Isometric or Resistance type exercises. BENEFITS OF MODERATE INTENSITY EXERCISE MOST DAYS OF THE WEEK:     1. Insulin Resistance improves 30-85%   2. Abdominal Fat decreases by 30%   3. Inflammatory Markers decrease by 30% (therefore pain decreases)   4. Systolic and Diastolic Blood Pressure decrease by 4 mmHg   5. HDL improves by 5%   6. Triglycerides decrease by 15%   7. Shift from small dense LDL to large dense LDL (therefore decrease Insulin Resistance)   8. Decrease coagulability                  Starting a Weight Loss Plan: Care Instructions  Your Care Instructions    If you are thinking about losing weight, it can be hard to know where to start. Your doctor can help you set up a weight loss plan that best meets your needs.  You may want to take a class on nutrition or exercise, or join a weight loss support group. If you have questions about how to make changes to your eating or exercise habits, ask your doctor about seeing a registered dietitian or an exercise specialist.  It can be a big challenge to lose weight. But you do not have to make huge changes at once. Make small changes, and stick with them. When those changes become habit, add a few more changes. If you do not think you are ready to make changes right now, try to pick a date in the future. Make an appointment to see your doctor to discuss whether the time is right for you to start a plan. Follow-up care is a key part of your treatment and safety. Be sure to make and go to all appointments, and call your doctor if you are having problems. It's also a good idea to know your test results and keep a list of the medicines you take. How can you care for yourself at home? · Set realistic goals. Many people expect to lose much more weight than is likely. A weight loss of 5% to 10% of your body weight may be enough to improve your health. · Get family and friends involved to provide support. Talk to them about why you are trying to lose weight, and ask them to help. They can help by participating in exercise and having meals with you, even if they may be eating something different. · Find what works best for you. If you do not have time or do not like to cook, a program that offers meal replacement bars or shakes may be better for you. Or if you like to prepare meals, finding a plan that includes daily menus and recipes may be best.  · Ask your doctor about other health professionals who can help you achieve your weight loss goals. ¨ A dietitian can help you make healthy changes in your diet.   ¨ An exercise specialist or  can help you develop a safe and effective exercise program.  ¨ A counselor or psychiatrist can help you cope with issues such as depression, anxiety, or family problems that can make it hard to focus on weight loss.  · Consider joining a support group for people who are trying to lose weight. Your doctor can suggest groups in your area. Where can you learn more? Go to http://anna-alistair.info/. Enter E536 in the search box to learn more about \"Starting a Weight Loss Plan: Care Instructions. \"  Current as of: October 13, 2016  Content Version: 11.4  © 1111-4952 [employer]. Care instructions adapted under license by [employer] (which disclaims liability or warranty for this information). If you have questions about a medical condition or this instruction, always ask your healthcare professional. [place] [address] any warranty or liability for your use of this information.

## 2018-06-19 NOTE — MR AVS SNAPSHOT
[address] [place] [address] 
[address] [facility] [address] 
[phone] Patient: Jeaneth Ureña MRN: [id number] :1985 Visit Information Date & Time Provider Department Dept. Phone Encounter #  
 2018  8:45 AM DO Otto Ramirezyou-Palmmaury [phone] [id number] Follow-up Instructions Return in about 2 months (around 2018) for Thyroid, weight, results. Upcoming Health Maintenance Date Due  
 PAP AKA CERVICAL CYTOLOGY 10/18/2018* Influenza Age 5 to Adult 2018 DTaP/Tdap/Td series (2 - Td) 4/10/2027 *Topic was postponed. The date shown is not the original due date. Allergies as of 2018  Review Complete On: 2018 By: Matt Ventura, DO Severity Noted Reaction Type Reactions Latex Low 2015    Itching Other Medication High 02/15/2017    Rash [facility] Metformin  02/15/2017   Intolerance Diarrhea  
 severe Mirena [Levonorgestrel]  10/31/2013    Other (comments) Severe abdominal pain GALACTORRHEA Current Immunizations  Reviewed on 2018 Name Date Influenza Vaccine 10/3/2016, 10/31/2013 Reviewed by Matt Ventura, DO on 2018 at  9:28 AM  
You Were Diagnosed With   
  
 Codes Comments Hypothyroidism due to acquired atrophy of thyroid    -  Primary ICD-10-CM: E03.4 ICD-9-CM: 244.8, 246.8 uncontrolled since off Synthroid 2018 Pure hypercholesterolemia     ICD-10-CM: E78.00 ICD-9-CM: 272.0 Hyperglycemia     ICD-10-CM: R73.9 ICD-9-CM: 790.29 Vitamin D deficiency     ICD-10-CM: E55.9 ICD-9-CM: 268.9 Chronic constipation     ICD-10-CM: K59.09 
ICD-9-CM: 564.00 Hyperinsulinemia     ICD-10-CM: E16.1 ICD-9-CM: 251.1 Weight gain     ICD-10-CM: R63.5 ICD-9-CM: 783.1 27# since 2017 due to stress vs thyroid vs other Obesity, Class II, BMI 35-39.9, with comorbidity     ICD-10-CM: E66.9 ICD-9-CM: 278.00 Adjustment disorder with mixed anxiety and depressed mood     ICD-10-CM: F43.23 
ICD-9-CM: 309.28 due to job stress vs single mom stress w child's school vs starting a new job 04/30/18 vs other, stable Hypothyroidism due to acquired atrophy of thyroid     ICD-10-CM: E03.4 ICD-9-CM: 244.8, 246.8 Vitals BP Pulse Temp Resp Height(growth percentile) Weight(growth percentile) 119/84 (BP 1 Location: Right arm, BP Patient Position: Sitting) 90 98.1 °F (36.7 °C) (Oral) 16 5' 3\" (1.6 m) 219 lb 12.8 oz (99.7 kg) SpO2 BMI OB Status Smoking Status 97% 38.94 kg/m2 Having regular periods Never Smoker Vitals History BMI and BSA Data Body Mass Index Body Surface Area 38.94 kg/m 2 2.11 m 2 Preferred Pharmacy Pharmacy Name Phone [id number] [place], [place] - [address] [phone] Your Updated Medication List  
  
   
This list is accurate as of 6/19/18  9:50 AM.  Always use your most recent med list.  
  
  
  
  
 levothyroxine 125 mcg tablet Commonly known as:  SYNTHROID Take 1 Tab by mouth Daily (before breakfast). Indications: hypothyroidism  
  
 phendimetrazine tartrate 105 mg Cper Take 1 Cap by mouth daily. Max Daily Amount: 1 Cap. Indications: WEIGHT LOSS MANAGEMENT FOR OBESE PATIENT (BMI >= 30) Prescriptions Sent to Pharmacy Refills  
 levothyroxine (SYNTHROID) 125 mcg tablet 5 Sig: Take 1 Tab by mouth Daily (before breakfast). Indications: hypothyroidism Class: Normal  
 Pharmacy[facility] [id number] [facility] [address]  #: [phone] Route: Oral  
  
We Performed the Following HEMOGLOBIN A1C WITH EAG [69303 CPT(R)] INSULIN [id number] CPT(R)] LIPID PANEL [17423 CPT(R)] METABOLIC PANEL, COMPREHENSIVE [69132 CPT(R)] T4, FREE [id number] CPT(R)] TSH 3RD GENERATION [79463 CPT(R)] VITAMIN D, 25 HYDROXY [id number] CPT(R)] Follow-up Instructions Return in about 2 months (around 8/19/2018) for Thyroid, weight, results. Patient Instructions Learning About Sleeping Well What does sleeping well mean? Sleeping well means getting enough sleep. How much sleep is enough varies among people. The number of hours you sleep is not as important as how you feel when you wake up. If you do not feel refreshed, you probably need more sleep. Another sign of not getting enough sleep is feeling tired during the day. The average total nightly sleep time is 7½ to 8 hours. Healthy adults may need a little more or a little less than this. Why is getting enough sleep important? Getting enough quality sleep is a basic part of good health. When your sleep suffers, your mood and your thoughts can suffer too. You may find yourself feeling more grumpy or stressed. Not getting enough sleep also can lead to serious problems, including injury, accidents, anxiety, and depression. What might cause poor sleeping? Many things can cause sleep problems, including: · Stress. Stress can be caused by fear about a single event, such as giving a speech. Or you may have ongoing stress, such as worry about work or school. · Depression, anxiety, and other mental or emotional conditions. · Changes in your sleep habits or surroundings. This includes changes that happen where you sleep, such as noise, light, or sleeping in a different bed. It also includes changes in your sleep pattern, such as having jet lag or working a late shift. · Health problems, such as pain, breathing problems, and restless legs syndrome. · Lack of regular exercise. How can you help yourself? Here are some tips that may help you sleep more soundly and wake up feeling more refreshed. Your sleeping area · Use your bedroom only for sleeping and sex.  A bit of light reading may help you fall asleep. But if it doesn't, do your reading elsewhere in the house. Don't watch TV in bed. · Be sure your bed is big enough to stretch out comfortably, especially if you have a sleep partner. · Keep your bedroom quiet, dark, and cool. Use curtains, blinds, or a sleep mask to block out light. To block out noise, use earplugs, soothing music, or a \"white noise\" machine. Your evening and bedtime routine · Create a relaxing bedtime routine. You might want to take a warm shower or bath, listen to soothing music, or drink a cup of noncaffeinated tea. · Go to bed at the same time every night. And get up at the same time every morning, even if you feel tired. What to avoid · Limit caffeine (coffee, tea, caffeinated sodas) during the day, and don't have any for at least 4 to 6 hours before bedtime. · Don't drink alcohol before bedtime. Alcohol can cause you to wake up more often during the night. · Don't smoke or use tobacco, especially in the evening. Nicotine can keep you awake. · Don't take naps during the day, especially close to bedtime. · Don't lie in bed awake for too long. If you can't fall asleep, or if you wake up in the middle of the night and can't get back to sleep within 15 minutes or so, get out of bed and go to another room until you feel sleepy. · Don't take medicine right before bed that may keep you awake or make you feel hyper or energized. Your doctor can tell you if your medicine may do this and if you can take it earlier in the day. If you can't sleep · Imagine yourself in a peaceful, pleasant scene. Focus on the details and feelings of being in a place that is relaxing. · Get up and do a quiet or boring activity until you feel sleepy. · Don't drink any liquids after 6 p.m. if you wake up often because you have to go to the bathroom. Where can you learn more? Go to http://anna-alistair.info/. Enter D712 in the search box to learn more about \"Learning About Sleeping Well. \" Current as of: May 12, 2017 Content Version: 11.4 © 1179-4727 [employer]. Care instructions adapted under license by [employer] (which disclaims liability or warranty for this information). If you have questions about a medical condition or this instruction, always ask your healthcare professional. [address] any warranty or liability for your use of this information. Hypothyroidism: Care Instructions Your Care Instructions You have hypothyroidism, which means that your body is not making enough thyroid hormone. This hormone helps your body use energy. If your thyroid level is low, you may feel tired, be constipated, have an increase in your blood pressure, or have dry skin or memory problems. You may also get cold easily, even when it is warm. Women with low thyroid levels may have heavy menstrual periods. A blood test to find your thyroid-stimulating hormone (TSH) level is used to check for hypothyroidism. A high TSH level may mean that you have low thyroid. When your body is not making enough thyroid hormone, TSH levels rise in an effort to make the body produce more. The treatment for hypothyroidism is to take thyroid hormone pills. You should start to feel better in 1 to 2 weeks. But it can take several months to see changes in the TSH level. You will need regular visits with your doctor to make sure you have the right dose of medicine. Most people need treatment for the rest of their lives. You will need to see your doctor regularly to have blood tests and to make sure you are doing well. Follow-up care is a key part of your treatment and safety. Be sure to make and go to all appointments, and call your doctor if you are having problems. It's also a good idea to know your test results and keep a list of the medicines you take. How can you care for yourself at home? · Take your thyroid hormone medicine exactly as prescribed. Call your doctor if you think you are having a problem with your medicine. Most people do not have side effects if they take the right amount of medicine regularly. ¨ Take the medicine 30 minutes before breakfast, and do not take it with calcium, vitamins, or iron. ¨ Do not take extra doses of your thyroid medicine. It will not help you get better any faster, and it may cause side effects. ¨ If you forget to take a dose, do NOT take a double dose of medicine. Take your usual dose the next day. · Tell your doctor about all prescription, herbal, or over-the-counter products you take. · Take care of yourself. Eat a healthy diet, get enough sleep, and get regular exercise. When should you call for help? Call 911 anytime you think you may need emergency care. For example, call if: 
? · You passed out (lost consciousness). ? · You have severe trouble breathing. ? · You have a very slow heartbeat (less than 60 beats a minute). ? · You have a low body temperature (95°F or below). ?Call your doctor now or seek immediate medical care if: 
? · You feel tired, sluggish, or weak. ? · You have trouble remembering things or concentrating. ? · You do not begin to feel better 2 weeks after starting your medicine. ? Watch closely for changes in your health, and be sure to contact your doctor if you have any problems. Where can you learn more? Go to http://anna-alistair.info/. Enter T971 in the search box to learn more about \"Hypothyroidism: Care Instructions. \" Current as of: May 12, 2017 Content Version: 11.4 © 9714-5040 [employer]. Care instructions adapted under license by webme (which disclaims liability or warranty for this information).  If you have questions about a medical condition or this instruction, always ask your healthcare professional. [employer] disclaims any warranty or liability for your use of this information. Starting a Weight Loss Plan: Care Instructions Your Care Instructions If you are thinking about losing weight, it can be hard to know where to start. Your doctor can help you set up a weight loss plan that best meets your needs. You may want to take a class on nutrition or exercise, or join a weight loss support group. If you have questions about how to make changes to your eating or exercise habits, ask your doctor about seeing a registered dietitian or an exercise specialist. 
It can be a big challenge to lose weight. But you do not have to make huge changes at once. Make small changes, and stick with them. When those changes become habit, add a few more changes. If you do not think you are ready to make changes right now, try to pick a date in the future. Make an appointment to see your doctor to discuss whether the time is right for you to start a plan. Follow-up care is a key part of your treatment and safety. Be sure to make and go to all appointments, and call your doctor if you are having problems. It's also a good idea to know your test results and keep a list of the medicines you take. How can you care for yourself at home? · Set realistic goals. Many people expect to lose much more weight than is likely. A weight loss of 5% to 10% of your body weight may be enough to improve your health. · Get family and friends involved to provide support. Talk to them about why you are trying to lose weight, and ask them to help. They can help by participating in exercise and having meals with you, even if they may be eating something different. · Find what works best for you. If you do not have time or do not like to cook, a program that offers meal replacement bars or shakes may be better for you.  Or if you like to prepare meals, finding a plan that includes daily menus and recipes may be best. 
 · Ask your doctor about other health professionals who can help you achieve your weight loss goals. ¨ A dietitian can help you make healthy changes in your diet. ¨ An exercise specialist or  can help you develop a safe and effective exercise program. 
¨ A counselor or psychiatrist can help you cope with issues such as depression, anxiety, or family problems that can make it hard to focus on weight loss. · Consider joining a support group for people who are trying to lose weight. Your doctor can suggest groups in your area. Where can you learn more? Go to http://anna[employer]Lumina Corp.alistair.info/. Enter J616 in the search box to learn more about \"Starting a Weight Loss Plan: Care Instructions. \" Current as of: October 13, 2016 Content Version: 11.4 © 5738-0755 [employer]. Care instructions adapted under license by [employer] (which disclaims liability or warranty for this information). If you have questions about a medical condition or this instruction, always ask your healthcare professional. [place] [address] any warranty or liability for your use of this information. WEIGHT LOSS PLAN 1. Read food labels and count calories. Goal 3626-0302 calories daily for women and 1454-8311 calories daily for men. Achieve this by decreasing caloric intake by 500 calories by weekly increments. NOTE:  1 pound = 3500 calories! Www.loseit. com Www.myfitnesspal.com Www.[facility]. I Had Cancer Www.healthfinder. gov Weight watchers mobile Calories needed to lose 1-2 pounds a week = 10 x your weight in pounds 2. Increase water intake. Per [employer] Weight loss Program, it is important to drink 1/2 your body weight in ounces of water daily. Decrease your water consumption 2-3 hours before bedtime to prevent sleep disturbance from frequent urination. 3.  Decrease sugary beverages.   Each can or glass of soda increases your risk of obesity by 60%. Can lose 10 pounds in a month by avoiding any soda. 12 oz can of soda = 140 calories, 16 oz cup of sweet tea = 200 calories 16 oz orange juice = 200 calories, 10 oz apple juice = 150 calories 32 oz sports drink = 200 calories, 16 oz punch = 240 calories 3.5 oz alcohol = 100-150 calories 4. Avoid High Fructose Corn Syrup products. This ingredient makes products highly addictive! 5. Exercise 30 minutes daily 5 days weekly, minimally. If you burn 3500 calories that equals a pound! Use a pedometer to count steps. Visit www. Project Manager for a free pedometer and diet recommendations. Your maximum heart rate = 220 - your age Never exercise at your maximum heart rate. See handout for target heart rate. 6.  Decrease carbohydrates (white bread, pasta, rice, potatoes, sweet foods and sweet drinks like soda, tea, coffee, juice and sports drinks). Increase fiber and protein. Goal:   calories daily of carbohydrates Try CHROMIUM PICONATE 200 MG THREE TIMES DAILY,  
 to decrease Premenstrual carbohydrate cravings. 7.  Eat 3-5 small meals daily, include lots of protein (beans/legumes, nuts, lean meat, eggs) and green vegetables with each. (Breakfast, lunch, dinner with 2 healthy snacks) 8. Get proper rest 7-8 hours uninterrupted. When you get less than 6 hours, it triggers hunger by affecting your Grehlin:Leptin ratio and this results in weight gain. 9.  Watch your food portions. Green leafy vegetables should cover 1/2 of the plate, lean meat 1/4 of the plate, starchy vegetable 1/4 of the plate. Use smaller plates. 10.  Do not eat until you are full. Eat until you are no longer hungry. If you are not sure, try drinking a glass of water before getting your second serving of food. 11.  Do not weigh yourself daily. Wait until your next office visit. Use how you feel and  how your clothes fit as measurements of success. 12.  Address your spirituality to draw strength from above during your journey. Remember \"I am fearfully and wonderfully made. Marvelous in His eyes. \" 
 
13. Set realistic, appropriate and achievable weight loss goals: 
 
 RECOMMENDED TARGET WEIGHT LOSS:  Initial weight loss of 5-10% of your initial body weight achieved over 6 months or a decrease of 2 BMI units. MINIMUM GOAL OF WEIGHT LOSS:  Reduce body weight and maintain a lower body weight. Prevent weight gain. RELATED WEBSITES:     
Www.obesityaction. org 
(and consider joining the Obesity Action Coalition for $25/year. The [facility] mission is to elevated and empower those affected by obesity through education, advocacy and support. Quarterly journals included in membership fee. ) Www.Turning Art 
www.ImmunGene RELATED DIETS:  Dr. Harika Morel Weight loss challenge, Mediterranean diet, [place] Diet, [employer] Watchers, Paleo Diet (anti-inflammatory diet) EXERCISE 150 MINUTES WEEKLY. THIS CAN BE ACHIEVED BY WORKING OUT OR WALKING A MINIMUM OF 30 MINUTES FOR 5 DAYS WEEKLY. YOU CAN EXERCISE IN INCREMENTS OF 10-15 MINUTES UP TO 3 TIMES A DAY. Consider performing \"brainless exercise. \"  Choose your favorite tv program.  Five minutes before and for 5 minutes after the tv program, stretch your body. While the program is on, walk in place watching the show. When commercials come on, rest or walk around the house to do other things. When the program begins, return to walking in place. When you are able keep walking during the commercials and add light weights to your ankles or hands. By the end of the show, you would have walked 30 minutes. If you need shorter spurts of exercise, walk during the commercials and rest during the show. Drink to glasses of water prior to any exercise to prevent dehydration and to improve the results of the work out. Your RESTING HEART RATE is the number of times your heart beats per minute when you are not exerting yourself. The more fit you are, the lower your resting heart rate will be. Your MAXIMUM HEART RATE is the number of times per minute your heart pumps when it is working at 100% capacity. NEVER EXERCISE AT YOUR MAXIMUM HEART RATE. MAX HEART RATE = 220 - your age For example, in a 48year old, the maximum heart rate is 220-50 = 170 beats per minute Your TARGET HEART RATE is the number of beats per minute our heart should pump during aerobic exercise. Reaching your target heart rate indicates that your body is receiving maximum cardiovascular and fat burning benefits. If you are fit, your TARGET HEART RATE = 70-85% of your maximum Heart rate For a 48year old with vigorous intensity physical activity, Target heart rate= 170 bpm x .70 = 119 bpm 
   Target heart rate = 170 bpm x .85=  145 bpm 
 
    Therefore, your target heart rate during physical activity is 119-145 If you are not fit, TARGET HEART RATE = 50-70% of your maximum Heart rate MODERATE CONSISTENT AEROBIC EXERCISE OR WALKING FOR AT LEAST 150 MINUTES WEEKLY IS AN ESSENTIAL PART OF ANY WEIGHT LOSS OF WEIGHT MAINTENANCE PROGRAM. During WEIGHT LOSS PHASE, at least 75% of your exercise needs to be walking or moderate aerobic activity and 25% or 0% can be Isometric or Resistance type exercises (the latter can be deferred to the weight maintenance phase.) During WEIGHT MAINTENANCE PHASE, at least 50% of your exercise needs to be aerobic and 50% Isometric or Resistance type exercises. BENEFITS OF MODERATE INTENSITY EXERCISE MOST DAYS OF THE WEEK: 
 
 1. Insulin Resistance improves 30-85% 2. Abdominal Fat decreases by 30% 3. Inflammatory Markers decrease by 30% (therefore pain decreases) 4. Systolic and Diastolic Blood Pressure decrease by 4 mmHg 5. HDL improves by 5% 6. Triglycerides decrease by 15% 7.  Shift from small dense LDL to large dense LDL (therefore decrease Insulin Resistance) 8. Decrease coagulability Starting a Weight Loss Plan: Care Instructions Your Care Instructions If you are thinking about losing weight, it can be hard to know where to start. Your doctor can help you set up a weight loss plan that best meets your needs. You may want to take a class on nutrition or exercise, or join a weight loss support group. If you have questions about how to make changes to your eating or exercise habits, ask your doctor about seeing a registered dietitian or an exercise specialist. 
It can be a big challenge to lose weight. But you do not have to make huge changes at once. Make small changes, and stick with them. When those changes become habit, add a few more changes. If you do not think you are ready to make changes right now, try to pick a date in the future. Make an appointment to see your doctor to discuss whether the time is right for you to start a plan. Follow-up care is a key part of your treatment and safety. Be sure to make and go to all appointments, and call your doctor if you are having problems. It's also a good idea to know your test results and keep a list of the medicines you take. How can you care for yourself at home? · Set realistic goals. Many people expect to lose much more weight than is likely. A weight loss of 5% to 10% of your body weight may be enough to improve your health. · Get family and friends involved to provide support. Talk to them about why you are trying to lose weight, and ask them to help. They can help by participating in exercise and having meals with you, even if they may be eating something different. · Find what works best for you. If you do not have time or do not like to cook, a program that offers meal replacement bars or shakes may be better for you.  Or if you like to prepare meals, finding a plan that includes daily menus and recipes may be best. 
· Ask your doctor about other health professionals who can help you achieve your weight loss goals. ¨ A dietitian can help you make healthy changes in your diet. ¨ An exercise specialist or  can help you develop a safe and effective exercise program. 
¨ A counselor or psychiatrist can help you cope with issues such as depression, anxiety, or family problems that can make it hard to focus on weight loss. · Consider joining a support group for people who are trying to lose weight. Your doctor can suggest groups in your area. Where can you learn more? Go to http://annaSix Trees Capitalalistair.info/. Enter Q481 in the search box to learn more about \"Starting a Weight Loss Plan: Care Instructions. \" Current as of: October 13, 2016 Content Version: 11.4 © 7973-3449 [employer]. Care instructions adapted under license by [employer] (which disclaims liability or warranty for this information). If you have questions about a medical condition or this instruction, always ask your healthcare professional. [place] [address] any warranty or liability for your use of this information. Introducing [facility]! Dear Jonah Roque: Thank you for requesting a [employer] account. Our records indicate that you already have an active [employer] account. You can access your account anytime at https://[facility]. [employer]/[facility] Did you know that you can access your hospital and ER discharge instructions at any time in [employer]? You can also review all of your test results from your hospital stay or ER visit. Additional Information If you have questions, please visit the Frequently Asked Questions section of the [employer] website at [email or web address]/[email or web address] Remember, [employer] is NOT to be used for urgent needs. For medical emergencies, dial 911. Now available from your iPhone and Android! Please provide this summary of care documentation to your next provider. Your primary care clinician is listed as Lula Abdul. If you have any questions after today's visit, please call [phone].

## 2018-06-19 NOTE — PROGRESS NOTES
Hollie King is a 28 y.o. female     Chief Complaint   Patient presents with    Weight Management     weight check and med refill       Visit Vitals    /84 (BP 1 Location: Right arm, BP Patient Position: Sitting)    Pulse 90    Temp 98.1 °F (36.7 °C) (Oral)    Resp 16    Ht 5' 3\" (1.6 m)    Wt 219 lb 12.8 oz (99.7 kg)    SpO2 97%    BMI 38.94 kg/m2       1. Have you been to the ER, urgent care clinic since your last visit? Hospitalized since your last visit? No    2. Have you seen or consulted any other health care providers outside of the [address] [place] [address] since your last visit? Include any pap smears or colon screening.  No

## 2018-07-07 LAB
25(OH)D3+25(OH)D2 SERPL-MCNC: 14.3 NG/ML (ref 30–100)
ALBUMIN SERPL-MCNC: 4.3 G/DL (ref 3.5–5.5)
ALBUMIN/GLOB SERPL: 1.5 {RATIO} (ref 1.2–2.2)
ALP SERPL-CCNC: 61 IU/L (ref 39–117)
ALT SERPL-CCNC: 16 IU/L (ref 0–32)
AST SERPL-CCNC: 14 IU/L (ref 0–40)
BILIRUB SERPL-MCNC: 0.3 MG/DL (ref 0–1.2)
BUN SERPL-MCNC: 4 MG/DL (ref 6–20)
BUN/CREAT SERPL: 6 (ref 9–23)
CALCIUM SERPL-MCNC: 9.6 MG/DL (ref 8.7–10.2)
CHLORIDE SERPL-SCNC: 102 MMOL/L (ref 96–106)
CHOLEST SERPL-MCNC: 199 MG/DL (ref 100–199)
CO2 SERPL-SCNC: 24 MMOL/L (ref 20–29)
CREAT SERPL-MCNC: 0.68 MG/DL (ref 0.57–1)
EST. AVERAGE GLUCOSE BLD GHB EST-MCNC: 114 MG/DL
GLOBULIN SER CALC-MCNC: 2.8 G/DL (ref 1.5–4.5)
GLUCOSE SERPL-MCNC: 97 MG/DL (ref 65–99)
HBA1C MFR BLD: 5.6 % (ref 4.8–5.6)
HDLC SERPL-MCNC: 41 MG/DL
INSULIN SERPL-ACNC: 17.8 UIU/ML (ref 2.6–24.9)
INTERPRETATION, 910389: NORMAL
LDLC SERPL CALC-MCNC: 145 MG/DL (ref 0–99)
POTASSIUM SERPL-SCNC: 4.6 MMOL/L (ref 3.5–5.2)
PROT SERPL-MCNC: 7.1 G/DL (ref 6–8.5)
SODIUM SERPL-SCNC: 139 MMOL/L (ref 134–144)
T4 FREE SERPL-MCNC: 1.94 NG/DL (ref 0.82–1.77)
TRIGL SERPL-MCNC: 67 MG/DL (ref 0–149)
TSH SERPL DL<=0.005 MIU/L-ACNC: 0.77 UIU/ML (ref 0.45–4.5)
VLDLC SERPL CALC-MCNC: 13 MG/DL (ref 5–40)

## 2018-08-20 ENCOUNTER — OFFICE VISIT (OUTPATIENT)
Dept: FAMILY MEDICINE CLINIC | Age: 33
End: 2018-08-20

## 2018-08-20 VITALS
HEIGHT: 64 IN | BODY MASS INDEX: 37.37 KG/M2 | DIASTOLIC BLOOD PRESSURE: 72 MMHG | RESPIRATION RATE: 13 BRPM | SYSTOLIC BLOOD PRESSURE: 113 MMHG | OXYGEN SATURATION: 99 % | TEMPERATURE: 98.3 F | WEIGHT: 218.9 LBS | HEART RATE: 79 BPM

## 2018-08-20 DIAGNOSIS — R73.9 HYPERGLYCEMIA: ICD-10-CM

## 2018-08-20 DIAGNOSIS — Z82.49 FAMILY HISTORY OF HEART DISEASE IN MALE FAMILY MEMBER BEFORE AGE 55: ICD-10-CM

## 2018-08-20 DIAGNOSIS — K59.09 CHRONIC CONSTIPATION: ICD-10-CM

## 2018-08-20 DIAGNOSIS — H93.12 TINNITUS, LEFT EAR: ICD-10-CM

## 2018-08-20 DIAGNOSIS — E78.00 PURE HYPERCHOLESTEROLEMIA: Primary | ICD-10-CM

## 2018-08-20 DIAGNOSIS — E55.9 VITAMIN D DEFICIENCY: ICD-10-CM

## 2018-08-20 DIAGNOSIS — E03.4 HYPOTHYROIDISM DUE TO ACQUIRED ATROPHY OF THYROID: ICD-10-CM

## 2018-08-20 DIAGNOSIS — J30.89 SEASONAL ALLERGIC RHINITIS DUE TO OTHER ALLERGIC TRIGGER: ICD-10-CM

## 2018-08-20 DIAGNOSIS — E16.1 HYPERINSULINEMIA: ICD-10-CM

## 2018-08-20 DIAGNOSIS — H10.13 ALLERGIC CONJUNCTIVITIS OF BOTH EYES: ICD-10-CM

## 2018-08-20 RX ORDER — PHENTERMINE HYDROCHLORIDE 37.5 MG/1
37.5 TABLET ORAL
Qty: 30 TAB | Refills: 0 | Status: SHIPPED | OUTPATIENT
Start: 2018-08-20 | End: 2018-10-02 | Stop reason: SDUPTHER

## 2018-08-20 RX ORDER — ERGOCALCIFEROL 1.25 MG/1
50000 CAPSULE ORAL
Qty: 5 CAP | Refills: 2 | Status: SHIPPED | OUTPATIENT
Start: 2018-08-20 | End: 2018-10-19

## 2018-08-20 NOTE — PROGRESS NOTES
Laura Emmanuel  Identified pt with two pt identifiers(name and ). Chief Complaint   Patient presents with    Thyroid Problem    Weight Management    Results    Ringing in Ear     left ear; denies pain and discomfort       1. Have you been to the ER, urgent care clinic since your last visit? Hospitalized since your last visit? No    2. Have you seen or consulted any other health care providers outside of the [address] [place] [address] since your last visit? Include any pap smears or colon screening. OBGYN    In the event something were to happen to you and you were unable to speak on your behalf, do you have an Advance Directive/ Living Will in place stating your wishes? NO    If yes, do we have a copy on file NO    If no, would you like information:          Medication reconciliation up to date and corrected with patient at this time. Today's provider has been notified of reason for visit, vitals and flowsheets obtained on patients. Reviewed record in preparation for visit, huddled with provider and have obtained necessary documentation. There are no preventive care reminders to display for this patient.     Wt Readings from Last 3 Encounters:   18 218 lb 14.4 oz (99.3 kg)   18 219 lb 12.8 oz (99.7 kg)   08/15/17 192 lb 9.6 oz (87.4 kg)     Temp Readings from Last 3 Encounters:   18 98.1 °F (36.7 °C) (Oral)   08/15/17 98.6 °F (37 °C) (Oral)   17 98.6 °F (37 °C) (Oral)     BP Readings from Last 3 Encounters:   18 119/84   08/15/17 112/76   17 108/75     Pulse Readings from Last 3 Encounters:   18 90   08/15/17 74   17 96     Vitals:    18 1420   BP: 113/72   Pulse: 79   Resp: 13   Temp: 98.3 °F (36.8 °C)   TempSrc: Oral   SpO2: 99%   Weight: 218 lb 14.4 oz (99.3 kg)   Height: 5' 3.58\" (1.615 m)   PainSc:   0 - No pain   LMP: 2018         Learning Assessment:  :     Learning Assessment 2018 3/23/2015   PRIMARY LEARNER Patient Patient HIGHEST LEVEL OF EDUCATION - PRIMARY LEARNER  - 2 YEARS OF COLLEGE   BARRIERS PRIMARY LEARNER NONE NONE   CO-LEARNER CAREGIVER No -   PRIMARY LANGUAGE ENGLISH ENGLISH   LEARNER PREFERENCE PRIMARY DEMONSTRATION DEMONSTRATION   ANSWERED BY patient patient   RELATIONSHIP SELF SELF       Depression Screening:  :     PHQ over the last two weeks 6/19/2018   Little interest or pleasure in doing things Not at all   Feeling down, depressed, irritable, or hopeless Not at all   Total Score PHQ 2 0       Fall Risk Assessment:  :     Fall Risk Assessment, last 12 mths 6/19/2018   Able to walk? Yes   Fall in past 12 months? No       Abuse Screening:  :     Abuse Screening Questionnaire 6/19/2018 6/28/2016   Do you ever feel afraid of your partner? N N   Are you in a relationship with someone who physically or mentally threatens you? N N   Is it safe for you to go home?  Y Y       ADL Screening:  :     ADL Assessment 6/19/2018   Feeding yourself No Help Needed   Getting from bed to chair No Help Needed   Getting dressed No Help Needed   Bathing or showering No Help Needed   Walk across the room (includes cane/walker) No Help Needed   Using the telphone No Help Needed   Taking your medications No Help Needed   Preparing meals No Help Needed   Managing money (expenses/bills) No Help Needed   Moderately strenuous housework (laundry) No Help Needed   Shopping for personal items (toiletries/medicines) No Help Needed   Shopping for groceries No Help Needed   Driving No Help Needed   Climbing a flight of stairs No Help Needed   Getting to places beyond walking distances No Help Needed

## 2018-08-20 NOTE — MR AVS SNAPSHOT
[address] [place] 
[address] [place] [address] 
[phone] Patient: Damien Oconnell MRN: [id number] :1985 Visit Information Date & Time Provider Department Dept. Phone Encounter #  
 2018  2:00 PM DO Lester Malin [phone] [id number] Follow-up Instructions Return in about 1 month (around 2018) for weight, blood pressure. Upcoming Health Maintenance Date Due Influenza Age 5 to Adult 2018* PAP AKA CERVICAL CYTOLOGY 10/18/2018* DTaP/Tdap/Td series (2 - Td) 4/10/2027 *Topic was postponed. The date shown is not the original due date. Allergies as of 2018  Review Complete On: 2018 By: Chana Never Severity Noted Reaction Type Reactions Latex Low 2015    Itching Other Medication High 02/15/2017    Rash [facility] Metformin  02/15/2017   Intolerance Diarrhea  
 severe Mirena [Levonorgestrel]  10/31/2013    Other (comments) Severe abdominal pain GALACTORRHEA Current Immunizations  Reviewed on 2018 Name Date Influenza Vaccine 10/3/2016, 10/31/2013 Reviewed by Marium Phelps DO on 2018 at  2:50 PM  
 Reviewed by Marium Phelps DO on 2018 at  2:54 PM  
You Were Diagnosed With   
  
 Codes Comments Pure hypercholesterolemia    -  Primary ICD-10-CM: E78.00 ICD-9-CM: 272.0 improving with diet changes Hypothyroidism due to acquired atrophy of thyroid     ICD-10-CM: E03.4 ICD-9-CM: 244.8, 246.8 slightly overtreated Seasonal allergic rhinitis due to other allergic trigger     ICD-10-CM: J30.89 ICD-9-CM: 477.8 Vitamin D deficiency     ICD-10-CM: E55.9 ICD-9-CM: 268.9 Hyperinsulinemia     ICD-10-CM: E16.1 ICD-9-CM: 251.1 resolved with diet changes Hyperglycemia     ICD-10-CM: R73.9 ICD-9-CM: 790.29 resolved with diet changes Allergic conjunctivitis of both eyes     ICD-10-CM: H10.13 ICD-9-CM: 372.14 Tinnitus, left ear     ICD-10-CM: H93.12 
ICD-9-CM: 388.30 x 1.5 months Chronic constipation     ICD-10-CM: K59.09 
ICD-9-CM: 564.00 stable with increased water intake Obesity, Class II, BMI 35-39.9, with comorbidity     ICD-10-CM: E66.9 ICD-9-CM: 278.00 Family history of heart disease in male family member before age 54     ICD-10-CM: Z80.55 
ICD-9-CM: V17.49 Vitals BP Pulse Temp Resp Height(growth percentile) Weight(growth percentile) 113/72 (BP 1 Location: Right arm, BP Patient Position: Sitting) 79 98.3 °F (36.8 °C) (Oral) 13 5' 3.58\" (1.615 m) 218 lb 14.4 oz (99.3 kg) LMP SpO2 BMI OB Status Smoking Status 07/19/2018 (Exact Date) 99% 38.07 kg/m2 Having regular periods Never Smoker Vitals History BMI and BSA Data Body Mass Index Body Surface Area 38.07 kg/m 2 2.11 m 2 Preferred Pharmacy Pharmacy Name Phone [id number]- [place], [place] - [address] [phone] Your Updated Medication List  
  
   
This list is accurate as of 8/20/18  2:56 PM.  Always use your most recent med list.  
  
  
  
  
 ergocalciferol 50,000 unit capsule Commonly known as:  ERGOCALCIFEROL Take 1 Cap by mouth every seven (7) days for 60 days. For low vit D. Take for 2 months then start over the counter vit D 1000 units daily. levothyroxine 125 mcg tablet Commonly known as:  SYNTHROID Take 1 Tab by mouth Daily (before breakfast). Indications: hypothyroidism  
  
 phentermine 37.5 mg tablet Commonly known as:  ADIPEX-P Take 1 Tab by mouth every morning. Max Daily Amount: 37.5 mg. Indications: WEIGHT LOSS MANAGEMENT FOR OBESE PATIENT (BMI >= 30) Prescriptions Printed Refills  
 phentermine (ADIPEX-P) 37.5 mg tablet 0 Sig: Take 1 Tab by mouth every morning.  Max Daily Amount: 37.5 mg. Indications: WEIGHT LOSS MANAGEMENT FOR OBESE PATIENT (BMI >= 30) Class: Print Route: Oral  
  
Prescriptions Sent to Pharmacy Refills  
 ergocalciferol (ERGOCALCIFEROL) 50,000 unit capsule 2 Sig: Take 1 Cap by mouth every seven (7) days for 60 days. For low vit D. Take for 2 months then start over the counter vit D 1000 units daily. Class: Normal  
 Pharmacy: [facility]/pharmacy [id number]  #: [phone] Route: Oral  
  
We Performed the Following LIPID PANEL [17255 CPT(R)] T4, FREE [id number] CPT(R)] TSH 3RD GENERATION [25315 CPT(R)] VITAMIN D, 25 HYDROXY [id number] CPT(R)] Follow-up Instructions Return in about 1 month (around 2018) for weight, blood pressure. Patient Instructions Allergies: Care Instructions Your Care Instructions Allergies occur when your body's defense system (immune system) overreacts to certain substances. The immune system treats a harmless substance as if it were a harmful germ or virus. Many things can cause this overreaction, including pollens, medicine, food, dust, animal dander, and mold. Allergies can be mild or severe. Mild allergies can be managed with home treatment. But medicine may be needed to prevent problems. Managing your allergies is an important part of staying healthy. Your doctor may suggest that you have allergy testing to help find out what is causing your allergies. When you know what things trigger your symptoms, you can avoid them. This can prevent allergy symptoms and other health problems. For severe allergies that cause reactions that affect your whole body (anaphylactic reactions), your doctor may prescribe a shot of epinephrine to carry with you in case you have a severe reaction. Learn how to give yourself the shot and keep it with you at all times. Make sure it is not . Follow-up care is a key part of your treatment and safety. Be sure to make and go to all appointments, and call your doctor if you are having problems. It's also a good idea to know your test results and keep a list of the medicines you take. How can you care for yourself at home? · If you have been told by your doctor that dust or dust mites are causing your allergy, decrease the dust around your bed: 
[facility] sheets, pillowcases, and other bedding in hot water every week. ¨ Use dust-proof covers for pillows, duvets, and mattresses. Avoid plastic covers because they tear easily and do not \"breathe. \" Wash as instructed on the label. ¨ Do not use any blankets and pillows that you do not need. ¨ Use blankets that you can wash in your washing machine. ¨ Consider removing drapes and carpets, which attract and hold dust, from your bedroom. · If you are allergic to house dust and mites, do not use home humidifiers. Your doctor can suggest ways you can control dust and mites. · Look for signs of cockroaches. Cockroaches cause allergic reactions. Use cockroach baits to get rid of them. Then, clean your home well. Cockroaches like areas where grocery bags, newspapers, empty bottles, or cardboard boxes are stored. Do not keep these inside your home, and keep trash and food containers sealed. Seal off any spots where cockroaches might enter your home. · If you are allergic to mold, get rid of furniture, rugs, and drapes that smell musty. Check for mold in the bathroom. · If you are allergic to outdoor pollen or mold spores, use air-conditioning. Change or clean all filters every month. Keep windows closed. · If you are allergic to pollen, stay inside when pollen counts are high. Use a vacuum  with a HEPA filter or a double-thickness filter at least two times each week. · Stay inside when air pollution is bad. Avoid paint fumes, perfumes, and other strong odors. · Avoid conditions that make your allergies worse. Stay away from smoke. Do not smoke or let anyone else smoke in your house. Do not use fireplaces or wood-burning stoves. · If you are allergic to your pets, change the air filter in your furnace every month. Use high-efficiency filters. · If you are allergic to pet dander, keep pets outside or out of your bedroom. Old carpet and cloth furniture can hold a lot of animal dander. You may need to replace them. When should you call for help? Give an epinephrine shot if: 
  · You think you are having a severe allergic reaction.  
  · You have symptoms in more than one body area, such as mild nausea and an itchy mouth.  
 After giving an epinephrine shot call 911, even if you feel better. 
 Call 911 if: 
  · You have symptoms of a severe allergic reaction. These may include: 
¨ Sudden raised, red areas (hives) all over your body. ¨ Swelling of the throat, mouth, lips, or tongue. ¨ Trouble breathing. ¨ Passing out (losing consciousness). Or you may feel very lightheaded or suddenly feel weak, confused, or restless.  
  · You have been given an epinephrine shot, even if you feel better.  
 Call your doctor now or seek immediate medical care if: 
  · You have symptoms of an allergic reaction, such as: ¨ A rash or hives (raised, red areas on the skin). ¨ Itching. ¨ Swelling. ¨ Belly pain, nausea, or vomiting.  
 Watch closely for changes in your health, and be sure to contact your doctor if: 
  · You do not get better as expected. Where can you learn more? Go to http://anna-alistair.info/. Enter N924 in the search box to learn more about \"Allergies: Care Instructions. \" Current as of: October 6, 2017 Content Version: 11.7 © 7468-7469 [employer]. Care instructions adapted under license by [employer] (which disclaims liability or warranty for this information).  If you have questions about a medical condition or this instruction, always ask your healthcare professional. [place] [address] any warranty or liability for your use of this information. Advise patient to start taking Over The Counter allergy medication (Allegra, Zyrtec, Claritin, Xyzal or Alavert) daily. If you have itchy, watery eyes you can try OTC Zaditor or Zyrtec Allergy Eye drops. If you have a stuffy nose, please try OTC Flonase, Flonase Sensimist, Rhinocort, or Nasacort AQ 2 squirts up each nostril once a day (adults) or 1 squirt up each nostril once a day (children). Use allergy free, dye free, fragrance free products on your body and hair. After being outdoors, brush hair vigorously, wash face and arms, rinse nostrils with a nasal saline spray and consider changing your clothing. Dust furniture frequently and wear a mask while doing it. Vacuum floors weekly. Remove stuffed animals or extra pillows from the bed. Clean bedding in hot water weekly. Sometimes allergies can be so severe that 2 nasal sprays and 2 pills may be needed to control symptoms. Advised protocol for clearing congestion:  Increase fluid intake, especially water to thin mucous and boost the immune system. Avoid sugar and dairy while congested since they thicken mucous. Get plenty of rest!  Gargle 3 times daily and as needed in Listerine or warm salt water vinegar solutions (1 tsp salt, 1 tsp vinegar in 1 cup lukewarm water.)  Use OTC nasal saline spray up each nostril twice daily. Use humidifier at bedtime. Use OTC Mucinex 600 mg twice daily to loosen mucous. Use OTC Tylenol Arthritis or Ibuprofen up to 800 mg up to 3 times daily as needed for pain, fever or headaches. Avoid decongestants and Ibuprofen if you have high blood pressure! If mucous is consistently discolored yellow or green throughout the day for more than a week, call the doctor for an evaluation. Allergies: Care Instructions Your Care Instructions Allergies occur when your body's defense system (immune system) overreacts to certain substances. The immune system treats a harmless substance as if it were a harmful germ or virus. Many things can cause this overreaction, including pollens, medicine, food, dust, animal dander, and mold. Allergies can be mild or severe. Mild allergies can be managed with home treatment. But medicine may be needed to prevent problems. Managing your allergies is an important part of staying healthy. Your doctor may suggest that you have allergy testing to help find out what is causing your allergies. When you know what things trigger your symptoms, you can avoid them. This can prevent allergy symptoms and other health problems. For severe allergies that cause reactions that affect your whole body (anaphylactic reactions), your doctor may prescribe a shot of epinephrine to carry with you in case you have a severe reaction. Learn how to give yourself the shot and keep it with you at all times. Make sure it is not . Follow-up care is a key part of your treatment and safety. Be sure to make and go to all appointments, and call your doctor if you are having problems. It's also a good idea to know your test results and keep a list of the medicines you take. How can you care for yourself at home? · If you have been told by your doctor that dust or dust mites are causing your allergy, decrease the dust around your bed: 
[facility] sheets, pillowcases, and other bedding in hot water every week. ¨ Use dust-proof covers for pillows, duvets, and mattresses. Avoid plastic covers because they tear easily and do not \"breathe. \" Wash as instructed on the label. ¨ Do not use any blankets and pillows that you do not need. ¨ Use blankets that you can wash in your washing machine. ¨ Consider removing drapes and carpets, which attract and hold dust, from your bedroom.  
· If you are allergic to house dust and mites, do not use home humidifiers. Your doctor can suggest ways you can control dust and mites. · Look for signs of cockroaches. Cockroaches cause allergic reactions. Use cockroach baits to get rid of them. Then, clean your home well. Cockroaches like areas where grocery bags, newspapers, empty bottles, or cardboard boxes are stored. Do not keep these inside your home, and keep trash and food containers sealed. Seal off any spots where cockroaches might enter your home. · If you are allergic to mold, get rid of furniture, rugs, and drapes that smell musty. Check for mold in the bathroom. · If you are allergic to outdoor pollen or mold spores, use air-conditioning. Change or clean all filters every month. Keep windows closed. · If you are allergic to pollen, stay inside when pollen counts are high. Use a vacuum  with a HEPA filter or a double-thickness filter at least two times each week. · Stay inside when air pollution is bad. Avoid paint fumes, perfumes, and other strong odors. · Avoid conditions that make your allergies worse. Stay away from smoke. Do not smoke or let anyone else smoke in your house. Do not use fireplaces or wood-burning stoves. · If you are allergic to your pets, change the air filter in your furnace every month. Use high-efficiency filters. · If you are allergic to pet dander, keep pets outside or out of your bedroom. Old carpet and cloth furniture can hold a lot of animal dander. You may need to replace them. When should you call for help? Give an epinephrine shot if: 
  · You think you are having a severe allergic reaction.  
  · You have symptoms in more than one body area, such as mild nausea and an itchy mouth.  
 After giving an epinephrine shot call 911, even if you feel better. 
 Call 911 if: 
  · You have symptoms of a severe allergic reaction. These may include: 
¨ Sudden raised, red areas (hives) all over your body. ¨ Swelling of the throat, mouth, lips, or tongue. ¨ Trouble breathing. ¨ Passing out (losing consciousness). Or you may feel very lightheaded or suddenly feel weak, confused, or restless.  
  · You have been given an epinephrine shot, even if you feel better.  
 Call your doctor now or seek immediate medical care if: 
  · You have symptoms of an allergic reaction, such as: ¨ A rash or hives (raised, red areas on the skin). ¨ Itching. ¨ Swelling. ¨ Belly pain, nausea, or vomiting.  
 Watch closely for changes in your health, and be sure to contact your doctor if: 
  · You do not get better as expected. Where can you learn more? Go to http://anna-alistair.info/. Enter R921 in the search box to learn more about \"Allergies: Care Instructions. \" Current as of: October 6, 2017 Content Version: 11.7 © 3031-0282 [employer]. Care instructions adapted under license by [employer] (which disclaims liability or warranty for this information). If you have questions about a medical condition or this instruction, always ask your healthcare professional. [place] [address] any warranty or liability for your use of this information. Tinnitus: Care Instructions Your Care Instructions Many people have some ringing sounds in their ears once in a while. You may hear a roar, a hiss, a tinkle, or a buzz. The sound usually lasts only a few minutes. If it goes on all the time, you may have tinnitus. Tinnitus is usually caused by long-term exposure to loud noise. This damages the nerves in the inner ear. It can occur with all types of hearing loss. It may be a symptom of almost any ear problem. Tinnitus may be caused by a buildup of earwax. Or it may be caused by ear infections or certain medicines (especially antibiotics or large amounts of aspirin). You can also hear noises in your ears because of an injury to the ears, drinking too much alcohol or caffeine, or a medical condition. You may need tests to evaluate your hearing and to find causes of long-lasting tinnitus. Your doctor may suggest one or more treatments to help you cope with it. You can also do things at home to help reduce symptoms. Follow-up care is a key part of your treatment and safety. Be sure to make and go to all appointments, and call your doctor if you are having problems. It's also a good idea to know your test results and keep a list of the medicines you take. How can you care for yourself at home? · Limit or cut out alcohol and caffeine. They can make your symptoms worse. · Do not smoke or use other tobacco products. Nicotine reduces blood flow to the ear and makes tinnitus worse. If you need help quitting, talk to your doctor about stop-smoking programs and medicines. These can increase your chances of quitting for good. · Talk to your doctor about whether to stop taking aspirin and similar products such as ibuprofen or naproxen. · Get exercise often. It can improve blood flow to the ear. Ways to cope with noise Some tinnitus may last a long time. To cope with noise, try to: · Avoid noises that you think caused your tinnitus. If you can't avoid loud noises, wear earplugs or earmuffs. · Ignore the sound by paying attention to other things. · Relax using biofeedback, meditation, or yoga. Feeling stressed and being tired can make tinnitus worse. · Play music or white noise to help you sleep. Background noise may cover up the noise that you hear in your ears. You can buy a machine that makes soothing sounds, such as ocean waves. When should you call for help? Call 911 anytime you think you may need emergency care. For example, call if: 
  · You have symptoms of a stroke. These may include: 
¨ Sudden numbness, tingling, weakness, or loss of movement in your face, arm, or leg, especially on only one side of your body. ¨ Sudden vision changes. ¨ Sudden trouble speaking. ¨ Sudden confusion or trouble understanding simple statements. ¨ Sudden problems with walking or balance. ¨ A sudden, severe headache that is different from past headaches.  
 Call your doctor now or seek immediate medical care if: 
  · You develop other symptoms. These may include hearing loss (or worse hearing loss), balance problems, dizziness, nausea, or vomiting.  
 Watch closely for changes in your health, and be sure to contact your doctor if: 
  · Your tinnitus moves from both ears to one ear.  
  · Your hearing loss gets worse within 1 day after an ear injury.  
  · Your tinnitus or hearing loss does not get better within 1 week after an ear injury.  
  · Your tinnitus bothers you enough that you want to take medicines to help you cope with it. Where can you learn more? Go to http://anna-alistair.info/. Enter S165 in the search box to learn more about \"Tinnitus: Care Instructions. \" Current as of: May 12, 2017 Content Version: 11.7 © 7624-3445 [employer]. Care instructions adapted under license by [employer] (which disclaims liability or warranty for this information). If you have questions about a medical condition or this instruction, always ask your healthcare professional. [place] [address] any warranty or liability for your use of this information. Introducing [facility]! Dear Isabel Wells: Thank you for requesting a [employer] account. Our records indicate that you already have an active [employer] account. You can access your account anytime at [email or web address] Did you know that you can access your hospital and ER discharge instructions at any time in [employer]? You can also review all of your test results from your hospital stay or ER visit. Additional Information If you have questions, please visit the Frequently Asked Questions section of the [employer] website at [email or web address]. Narrative. [email or web address] Remember, [employer] is NOT to be used for urgent needs. For medical emergencies, dial 911. Now available from your iPhone and Android! Please provide this summary of care documentation to your next provider. Your primary care clinician is listed as Robert Merchant. If you have any questions after today's visit, please call [phone].

## 2018-08-20 NOTE — PROGRESS NOTES
HISTORY OF PRESENT ILLNESS  Marbella Zavala is a 28 y.o. female presents with Thyroid Problem; Weight Management; Results; Ringing in Ear (left ear; denies pain and discomfort); Medication Refill; and Allergies    Agree with nurse note. Pt with hypercholesterolemia, hypothyroidism, vit d deficiency, hyperinsulinemia, hyperglycemia, chronic constipation, and family hx of heart disease presents to the office with a BP of 113/72. For thyroid, she uses Synthroid 125 mcg daily, tolerating well. Pt requested to review labs from 7/6/2018. Hgb A1c was 5.6, down from 5.9. Insulin 17.9, TSH 0.766, FT4 1.94, Vit D 14.3, HDL 41, , down from 167, Trigs 67, down from 83. Weight is 218 lbs, down 1 lb sound from 6/2018. It was her goal to not gain weight before this visit, so she is happy with her progress. She drinks 64 oz of water daily and previously drank 128 oz daily. Breakfast is [employer]. Lunch is [employer] pick 2 with soup and salad, but she only eats one and has the other the next day. Dinner is rotisserie chicken with sweet potato and succotash. She is interested in restarting a weight loss medication today. She was last rx'd Phendimetrazine in 8/2017 and Phentermine in 6/2017. She has recently bought an elliptical and is going to set up a small home gym in her sun room to increase her exercise. Pt recently started a new job but has been there for 90 days and is now able to take a vacation. Her son was previously being bullied but he has been doing much better. Pt complains of low pitched ringing in L ear x1.5 months. She says it is more muffled than actual ringing. She also has stuffy nose, itchy eyes, and frontal headaches with light sensitivity. Written by aakash Medellin, as dictated by Dr. Yanira Quinteros DO.    ROS    Review of Systems negative except as noted above in HPI.     ALLERGIES:    Allergies   Allergen Reactions    Latex Itching    Other Medication Rash     SPRINTEC  Metformin Diarrhea     severe    Mirena [Levonorgestrel] Other (comments)     Severe abdominal pain  GALACTORRHEA       CURRENT MEDICATIONS:    Outpatient Prescriptions Marked as Taking for the 18 encounter (Office Visit) with Kimberly Feliciano DO   Medication Sig Dispense Refill    phentermine (ADIPEX-P) 37.5 mg tablet Take 1 Tab by mouth every morning. Max Daily Amount: 37.5 mg. Indications: WEIGHT LOSS MANAGEMENT FOR OBESE PATIENT (BMI >= 30) 30 Tab 0    ergocalciferol (ERGOCALCIFEROL) 50,000 unit capsule Take 1 Cap by mouth every seven (7) days for 60 days. For low vit D. Take for 2 months then start over the counter vit D 1000 units daily. 5 Cap 2    levothyroxine (SYNTHROID) 125 mcg tablet Take 1 Tab by mouth Daily (before breakfast). Indications: hypothyroidism 30 Tab 5       PAST MEDICAL HISTORY:    Past Medical History:   Diagnosis Date    Allergy, unspecified not elsewhere classified     Ankle sprain     bilaterally. 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 Dr. Amena Felton, OB. Mackenzie Hernandez, Genetic Counselor   Anjali Melissa.  Elevated liver enzymes     Heart palpitations 2016    Exertional.  Dr. Dian Patricia. Negative Exercise Stress Test.    Hyperinsulinemia 2015    Hypothyroidism due to acquired atrophy of thyroid     Morbid obesity due to excess calories ([address] 2017    Obesity, Class II, BMI 35-39.9, with comorbidity 2017    Pre-diabetes 2017    Previous on Metformin for treatment.  Pure hypercholesterolemia 2013       PAST SURGICAL HISTORY:    Past Surgical History:   Procedure Laterality Date    HX  SECTION      HX DILATION AND CURETTAGE  2016    due to Cystic Hygroma/Terrell's Syndrome fetus. Dr. Nimo Webber   (placed),  (removed)    IUD. Dr. Luci Roblero.     HX TONSIL AND ADENOIDECTOMY  childhood    HX WISDOM TEETH EXTRACTION 08/2016    All 4 removed. FAMILY HISTORY:    Family History   Problem Relation Age of Onset   [facility] Arthritis-osteo Mother     Hypertension Mother    [facility] Migraines Mother     Other Mother      IBS    Hypertension Maternal Uncle     Asthma Maternal Uncle     Hypertension Maternal Grandmother     Other Maternal Grandmother      diverticulosis    Arthritis-osteo Maternal Grandmother     Cancer Maternal Grandfather      prostate    Arthritis-osteo Maternal Grandfather     Other Maternal Aunt      uterine fibroids    Obesity Other        SOCIAL HISTORY:    Social History     Social History    Marital status: SINGLE     Spouse name: N/A    Number of children: 1    Years of education: N/A     Social History Main Topics    Smoking status: Never Smoker    Smokeless tobacco: Never Used    Alcohol use 0.6 oz/week     1 Glasses of wine, 0 Standard drinks or equivalent per week      Comment: [place] - 1 every 2 weeks    Drug use: No    Sexual activity: Yes     Partners: Male     Birth control/ protection: Condom     Other Topics Concern    None     Social History Narrative       IMMUNIZATIONS:    Immunization History   Administered Date(s) Administered    Influenza Vaccine 10/31/2013, 10/03/2016         PHYSICAL EXAMINATION    Vital Signs    Visit Vitals    /72 (BP 1 Location: Right arm, BP Patient Position: Sitting)    Pulse 79    Temp 98.3 °F (36.8 °C) (Oral)    Resp 13    Ht 5' 3.58\" (1.615 m)    Wt 218 lb 14.4 oz (99.3 kg)    LMP 07/19/2018 (Exact Date)    SpO2 99%    BMI 38.07 kg/m2       Weight Metrics 8/20/2018 6/19/2018 6/19/2018 8/15/2017 8/15/2017 7/25/2017 7/17/2017   Weight 218 lb 14.4 oz - 219 lb 12.8 oz - 192 lb 9.6 oz 192 lb 3.2 oz -   Neck Circ (inches) - - - - - - -   Waist Measure Inches - 43.5 - 38.5 - - 39   Body Fat % - 38.8 - 39.2 - - 40   BMI 38.07 kg/m2 - 38.94 kg/m2 - 34.12 kg/m2 34.05 kg/m2 -       General appearance - Well nourished. Well appearing. Well developed. No acute distress. Obese. Head - Normocephalic. Atraumatic. Eyes - pupils equal and reactive. Extraocular eye movements intact. Sclera anicteric. Mildly injected sclera. Ears - Hearing is grossly normal bilaterally. Nose - normal and patent. No polyps noted. No erythema. No discharge. Mouth - mucous membranes with adequate moisture. Posterior pharynx normal with cobblestone appearance. No erythema, white exudate or obstruction. Neck - supple. Midline trachea. No carotid bruits noted bilaterally. No thyromegaly noted. Chest - clear to auscultation bilaterally anteriorly and posteriorly. No wheezes. No rales or rhonchi. Breath sounds are symmetrical bilaterally. Unlabored respirations. Heart - normal rate. Regular rhythm. Normal S1, S2. No murmur noted. No rubs, clicks or gallops noted. Abdomen - soft and distended. No masses or organomegaly. No rebound, rigidity or guarding. Bowel sounds normal x 4 quadrants. No tenderness noted. Neurological - awake, alert and oriented to person, place, and time and event. Cranial nerves II through XII intact. Clear speech. Muscle strength is +5/5 x 4 extremities. Sensation is intact to light touch bilaterally. Steady gait. Heme/Lymph - peripheral pulses normal x 4 extremities. No peripheral edema is noted. Musculoskeletal - Intact x 4 extremities. Full ROM x 4 extremities. No pain with movement. Back exam - normal range of motion. No pain on palpation of the spinous processes in the cervical, thoracic, lumbar, sacral regions. No CVA tenderness. Skin - no rashes, erythema, ecchymosis, lacerations, abrasions, suspicious moles noted  Psychological -   normal behavior, dress and thought processes. Good insight. Good eye contact. Normal affect. Appropriate mood. Normal speech.       DATA REVIEWED    Lab Results   Component Value Date/Time    WBC 9.4 02/01/2017 10:00 AM    Hemoglobin (POC) 13.6 02/21/2011 03:39 PM    HGB 13.4 02/01/2017 10:00 AM    Hematocrit (POC) 40 02/21/2011 03:39 PM    HCT 41 02/01/2017 10:00 AM    PLATELET 078 (H) 77/90/8052 10:00 AM    MCV 79 (L) 02/01/2017 10:00 AM     Lab Results   Component Value Date/Time    Sodium 139 07/06/2018 08:04 AM    Potassium 4.6 07/06/2018 08:04 AM    Chloride 102 07/06/2018 08:04 AM    CO2 24 07/06/2018 08:04 AM    Anion gap 13 02/01/2017 10:00 AM    Glucose 97 07/06/2018 08:04 AM    BUN 4 (L) 07/06/2018 08:04 AM    Creatinine 0.68 07/06/2018 08:04 AM    BUN/Creatinine ratio 6 (L) 07/06/2018 08:04 AM    GFR est  07/06/2018 08:04 AM    GFR est non- 07/06/2018 08:04 AM    Calcium 9.6 07/06/2018 08:04 AM    Bilirubin, total 0.3 07/06/2018 08:04 AM    AST (SGOT) 14 07/06/2018 08:04 AM    Alk. phosphatase 61 07/06/2018 08:04 AM    Protein, total 7.1 07/06/2018 08:04 AM    Albumin 4.3 07/06/2018 08:04 AM    Globulin 3.9 05/31/2015 07:58 PM    A-G Ratio 1.5 07/06/2018 08:04 AM    ALT (SGPT) 16 07/06/2018 08:04 AM     Lab Results   Component Value Date/Time    Cholesterol, total 199 07/06/2018 08:04 AM    HDL Cholesterol 41 07/06/2018 08:04 AM    LDL, calculated 145 (H) 07/06/2018 08:04 AM    VLDL, calculated 13 07/06/2018 08:04 AM    Triglyceride 67 07/06/2018 08:04 AM     Lab Results   Component Value Date/Time    Vitamin D 25-Hydroxy 12 (L) 02/01/2017 10:00 AM    VITAMIN D, 25-HYDROXY 14.3 (L) 07/06/2018 08:04 AM       Lab Results   Component Value Date/Time    Hemoglobin A1c 5.6 07/06/2018 08:04 AM     Lab Results   Component Value Date/Time    TSH 0.766 07/06/2018 08:04 AM    TSH 9.22 (H) 02/01/2017 10:00 AM       Lab Results   Component Value Date/Time    Microalb/Creat ratio (ug/mg creat.) 3.6 05/27/2016 10:16 AM         ASSESSMENT and PLAN      ICD-10-CM ICD-9-CM    1. Pure hypercholesterolemia E78.00 272.0 LIPID PANEL    improving with diet changes   2.  Hypothyroidism due to acquired atrophy of thyroid E03.4 244.8 TSH 3RD GENERATION     246.8 T4, FREE    slightly overtreated   3. Seasonal allergic rhinitis due to other allergic trigger J30.89 477.8    4. Vitamin D deficiency E55.9 268.9 ergocalciferol (ERGOCALCIFEROL) 50,000 unit capsule      VITAMIN D, 25 HYDROXY   5. Hyperinsulinemia E16.1 251.1     resolved with diet changes   6. Hyperglycemia R73.9 790.29     resolved with diet changes   7. Allergic conjunctivitis of both eyes H10.13 372.14    8. Tinnitus, left ear H93.12 388.30     x 1.5 months   9. Chronic constipation K59.09 564.00     stable with increased water intake   10. Obesity, Class II, BMI 35-39.9, with comorbidity E66.9 278.00 phentermine (ADIPEX-P) 37.5 mg tablet   11. Family history of heart disease in male family member before age 54 Z80.52 V15.46        Discussed the patient's BMI with her. The BMI follow up plan is as follows: I have counseled this patient on diet and exercise regimensDecrease carbohydrates (white foods, sweet foods, sweet drinks and alcohol), increase green leafy vegetables and protein (lean meats and beans) with each meal.  Avoid fried foods. Eat 3-5 small meals daily. Do not skip meals. Increase water intake. Increase physical activity to 30 minutes daily for health benefit or 60 minutes daily to prevent weight regain, as tolerated. Get 7-8 hours uninterrupted sleep nightly. Chart reviewed and updated. Continue current medications and care. Start 1/2 tab of Phentermine 37.5 mg daily. Start Rx Vitamin D 50,000IU weekly x3 months and then take OTC Vitamin D 5,000IU daily. Recommended using Zyrtec and OTC eye drops, and nasal spray everyday for next two weeks. Recommended use of Netti pot for stuffy nose. Advised pt to use OTC ear cleaning kit. Prescriptions written and sent to pharmacy; medication side effects discussed. Vit D 50,000IU. Prescription given to patient during office visit today. Phentermine 37.5 mg (month 1 of 3). Cautioned pt about addictive potential. [facility] and pt is [facility].    Most recent tests reviewed from 2018. Recheck pertinent labs in 3-6 months. Counseled patient on health concerns:  Stress, weight management, constipation, cholesterol, hypothyroidism, hyperinsulinemia, and vit d deficiency. Relevant handouts given and discussed with patient. Immunizations noted. Offered empathy, support, legitimation, prayers, partnership to patient. Praised patient for progress. ACP handout given today. Follow-up Disposition:  Return in about 1 month (around 2018) for weight, blood pressure. Patient was offered a choice/choices in the treatment plan today. Patient expresses understanding of the plan and agrees with recommendations. Written by aakash Martinez, as dictated by Dr. Klarissa Leyva DO. Documentation True and Accepted by Corina Cai. Patient Instructions          Allergies: Care Instructions  Your Care Instructions    Allergies occur when your body's defense system (immune system) overreacts to certain substances. The immune system treats a harmless substance as if it were a harmful germ or virus. Many things can cause this overreaction, including pollens, medicine, food, dust, animal dander, and mold. Allergies can be mild or severe. Mild allergies can be managed with home treatment. But medicine may be needed to prevent problems. Managing your allergies is an important part of staying healthy. Your doctor may suggest that you have allergy testing to help find out what is causing your allergies. When you know what things trigger your symptoms, you can avoid them. This can prevent allergy symptoms and other health problems. For severe allergies that cause reactions that affect your whole body (anaphylactic reactions), your doctor may prescribe a shot of epinephrine to carry with you in case you have a severe reaction. Learn how to give yourself the shot and keep it with you at all times. Make sure it is not .   Follow-up care is a key part of your treatment and safety. Be sure to make and go to all appointments, and call your doctor if you are having problems. It's also a good idea to know your test results and keep a list of the medicines you take. How can you care for yourself at home? · If you have been told by your doctor that dust or dust mites are causing your allergy, decrease the dust around your bed:  [facility] AUTHORITY sheets, pillowcases, and other bedding in hot water every week. ¨ Use dust-proof covers for pillows, duvets, and mattresses. Avoid plastic covers because they tear easily and do not \"breathe. \" Wash as instructed on the label. ¨ Do not use any blankets and pillows that you do not need. ¨ Use blankets that you can wash in your washing machine. ¨ Consider removing drapes and carpets, which attract and hold dust, from your bedroom. · If you are allergic to house dust and mites, do not use home humidifiers. Your doctor can suggest ways you can control dust and mites. · Look for signs of cockroaches. Cockroaches cause allergic reactions. Use cockroach baits to get rid of them. Then, clean your home well. Cockroaches like areas where grocery bags, newspapers, empty bottles, or cardboard boxes are stored. Do not keep these inside your home, and keep trash and food containers sealed. Seal off any spots where cockroaches might enter your home. · If you are allergic to mold, get rid of furniture, rugs, and drapes that smell musty. Check for mold in the bathroom. · If you are allergic to outdoor pollen or mold spores, use air-conditioning. Change or clean all filters every month. Keep windows closed. · If you are allergic to pollen, stay inside when pollen counts are high. Use a vacuum  with a HEPA filter or a double-thickness filter at least two times each week. · Stay inside when air pollution is bad. Avoid paint fumes, perfumes, and other strong odors. · Avoid conditions that make your allergies worse.  Stay away from smoke. Do not smoke or let anyone else smoke in your house. Do not use fireplaces or wood-burning stoves. · If you are allergic to your pets, change the air filter in your furnace every month. Use high-efficiency filters. · If you are allergic to pet dander, keep pets outside or out of your bedroom. Old carpet and cloth furniture can hold a lot of animal dander. You may need to replace them. When should you call for help? Give an epinephrine shot if:    · You think you are having a severe allergic reaction.     · You have symptoms in more than one body area, such as mild nausea and an itchy mouth.    After giving an epinephrine shot call 911, even if you feel better.   Call 911 if:    · You have symptoms of a severe allergic reaction. These may include:  ¨ Sudden raised, red areas (hives) all over your body. ¨ Swelling of the throat, mouth, lips, or tongue. ¨ Trouble breathing. ¨ Passing out (losing consciousness). Or you may feel very lightheaded or suddenly feel weak, confused, or restless.     · You have been given an epinephrine shot, even if you feel better.    Call your doctor now or seek immediate medical care if:    · You have symptoms of an allergic reaction, such as:  ¨ A rash or hives (raised, red areas on the skin). ¨ Itching. ¨ Swelling. ¨ Belly pain, nausea, or vomiting.    Watch closely for changes in your health, and be sure to contact your doctor if:    · You do not get better as expected. Where can you learn more? Go to http://anna-alistair.info/. Enter Q680 in the search box to learn more about \"Allergies: Care Instructions. \"  Current as of: October 6, 2017  Content Version: 11.7  © 3997-7161 [employer] Care instructions adapted under license by [employer] (which disclaims liability or warranty for this information).  If you have questions about a medical condition or this instruction, always ask your healthcare professional. [employer] disclaims any warranty or liability for your use of this information. Advise patient to start taking Over The Counter allergy medication (Allegra, Zyrtec, Claritin, Xyzal or Alavert) daily. If you have itchy, watery eyes you can try OTC Zaditor or Zyrtec Allergy Eye drops. If you have a stuffy nose, please try OTC Flonase, Flonase Sensimist, Rhinocort, or Nasacort AQ 2 squirts up each nostril once a day (adults) or 1 squirt up each nostril once a day (children). Use allergy free, dye free, fragrance free products on your body and hair. After being outdoors, brush hair vigorously, wash face and arms, rinse nostrils with a nasal saline spray and consider changing your clothing. Dust furniture frequently and wear a mask while doing it. Vacuum floors weekly. Remove stuffed animals or extra pillows from the bed. Clean bedding in hot water weekly. Sometimes allergies can be so severe that 2 nasal sprays and 2 pills may be needed to control symptoms. Advised protocol for clearing congestion:  Increase fluid intake, especially water to thin mucous and boost the immune system. Avoid sugar and dairy while congested since they thicken mucous. Get plenty of rest!  Gargle 3 times daily and as needed in Listerine or warm salt water vinegar solutions (1 tsp salt, 1 tsp vinegar in 1 cup lukewarm water.)  Use OTC nasal saline spray up each nostril twice daily. Use humidifier at bedtime. Use OTC Mucinex 600 mg twice daily to loosen mucous. Use OTC Tylenol Arthritis or Ibuprofen up to 800 mg up to 3 times daily as needed for pain, fever or headaches. Avoid decongestants and Ibuprofen if you have high blood pressure! If mucous is consistently discolored yellow or green throughout the day for more than a week, call the doctor for an evaluation.              Allergies: Care Instructions  Your Care Instructions    Allergies occur when your body's defense system (immune system) overreacts to certain substances. The immune system treats a harmless substance as if it were a harmful germ or virus. Many things can cause this overreaction, including pollens, medicine, food, dust, animal dander, and mold. Allergies can be mild or severe. Mild allergies can be managed with home treatment. But medicine may be needed to prevent problems. Managing your allergies is an important part of staying healthy. Your doctor may suggest that you have allergy testing to help find out what is causing your allergies. When you know what things trigger your symptoms, you can avoid them. This can prevent allergy symptoms and other health problems. For severe allergies that cause reactions that affect your whole body (anaphylactic reactions), your doctor may prescribe a shot of epinephrine to carry with you in case you have a severe reaction. Learn how to give yourself the shot and keep it with you at all times. Make sure it is not . Follow-up care is a key part of your treatment and safety. Be sure to make and go to all appointments, and call your doctor if you are having problems. It's also a good idea to know your test results and keep a list of the medicines you take. How can you care for yourself at home? · If you have been told by your doctor that dust or dust mites are causing your allergy, decrease the dust around your bed:  [facility] [place] sheets, pillowcases, and other bedding in hot water every week. ¨ Use dust-proof covers for pillows, duvets, and mattresses. Avoid plastic covers because they tear easily and do not \"breathe. \" Wash as instructed on the label. ¨ Do not use any blankets and pillows that you do not need. ¨ Use blankets that you can wash in your washing machine. ¨ Consider removing drapes and carpets, which attract and hold dust, from your bedroom. · If you are allergic to house dust and mites, do not use home humidifiers. Your doctor can suggest ways you can control dust and mites.   · Look for signs of cockroaches. Cockroaches cause allergic reactions. Use cockroach baits to get rid of them. Then, clean your home well. Cockroaches like areas where grocery bags, newspapers, empty bottles, or cardboard boxes are stored. Do not keep these inside your home, and keep trash and food containers sealed. Seal off any spots where cockroaches might enter your home. · If you are allergic to mold, get rid of furniture, rugs, and drapes that smell musty. Check for mold in the bathroom. · If you are allergic to outdoor pollen or mold spores, use air-conditioning. Change or clean all filters every month. Keep windows closed. · If you are allergic to pollen, stay inside when pollen counts are high. Use a vacuum  with a HEPA filter or a double-thickness filter at least two times each week. · Stay inside when air pollution is bad. Avoid paint fumes, perfumes, and other strong odors. · Avoid conditions that make your allergies worse. Stay away from smoke. Do not smoke or let anyone else smoke in your house. Do not use fireplaces or wood-burning stoves. · If you are allergic to your pets, change the air filter in your furnace every month. Use high-efficiency filters. · If you are allergic to pet dander, keep pets outside or out of your bedroom. Old carpet and cloth furniture can hold a lot of animal dander. You may need to replace them. When should you call for help? Give an epinephrine shot if:    · You think you are having a severe allergic reaction.     · You have symptoms in more than one body area, such as mild nausea and an itchy mouth.    After giving an epinephrine shot call 911, even if you feel better.   Call 911 if:    · You have symptoms of a severe allergic reaction. These may include:  ¨ Sudden raised, red areas (hives) all over your body. ¨ Swelling of the throat, mouth, lips, or tongue. ¨ Trouble breathing. ¨ Passing out (losing consciousness).  Or you may feel very lightheaded or suddenly feel weak, confused, or restless.     · You have been given an epinephrine shot, even if you feel better.    Call your doctor now or seek immediate medical care if:    · You have symptoms of an allergic reaction, such as:  ¨ A rash or hives (raised, red areas on the skin). ¨ Itching. ¨ Swelling. ¨ Belly pain, nausea, or vomiting.    Watch closely for changes in your health, and be sure to contact your doctor if:    · You do not get better as expected. Where can you learn more? Go to http://anna-alistair.info/. Enter W877 in the search box to learn more about \"Allergies: Care Instructions. \"  Current as of: October 6, 2017  Content Version: 11.7  © 4828-4182 [employer]. Care instructions adapted under license by [employer] (which disclaims liability or warranty for this information). If you have questions about a medical condition or this instruction, always ask your healthcare professional. [place] [id number] any warranty or liability for your use of this information. Tinnitus: Care Instructions  Your Care Instructions    Many people have some ringing sounds in their ears once in a while. You may hear a roar, a hiss, a tinkle, or a buzz. The sound usually lasts only a few minutes. If it goes on all the time, you may have tinnitus. Tinnitus is usually caused by long-term exposure to loud noise. This damages the nerves in the inner ear. It can occur with all types of hearing loss. It may be a symptom of almost any ear problem. Tinnitus may be caused by a buildup of earwax. Or it may be caused by ear infections or certain medicines (especially antibiotics or large amounts of aspirin). You can also hear noises in your ears because of an injury to the ears, drinking too much alcohol or caffeine, or a medical condition. You may need tests to evaluate your hearing and to find causes of long-lasting tinnitus.   Your doctor may suggest one or more treatments to help you cope with it. You can also do things at home to help reduce symptoms. Follow-up care is a key part of your treatment and safety. Be sure to make and go to all appointments, and call your doctor if you are having problems. It's also a good idea to know your test results and keep a list of the medicines you take. How can you care for yourself at home? · Limit or cut out alcohol and caffeine. They can make your symptoms worse. · Do not smoke or use other tobacco products. Nicotine reduces blood flow to the ear and makes tinnitus worse. If you need help quitting, talk to your doctor about stop-smoking programs and medicines. These can increase your chances of quitting for good. · Talk to your doctor about whether to stop taking aspirin and similar products such as ibuprofen or naproxen. · Get exercise often. It can improve blood flow to the ear. Ways to cope with noise  Some tinnitus may last a long time. To cope with noise, try to:  · Avoid noises that you think caused your tinnitus. If you can't avoid loud noises, wear earplugs or earmuffs. · Ignore the sound by paying attention to other things. · Relax using biofeedback, meditation, or yoga. Feeling stressed and being tired can make tinnitus worse. · Play music or white noise to help you sleep. Background noise may cover up the noise that you hear in your ears. You can buy a machine that makes soothing sounds, such as ocean waves. When should you call for help? Call 911 anytime you think you may need emergency care. For example, call if:    · You have symptoms of a stroke. These may include:  ¨ Sudden numbness, tingling, weakness, or loss of movement in your face, arm, or leg, especially on only one side of your body. ¨ Sudden vision changes. ¨ Sudden trouble speaking. ¨ Sudden confusion or trouble understanding simple statements. ¨ Sudden problems with walking or balance.   ¨ A sudden, severe headache that is different from past headaches.    Call your doctor now or seek immediate medical care if:    · You develop other symptoms. These may include hearing loss (or worse hearing loss), balance problems, dizziness, nausea, or vomiting.    Watch closely for changes in your health, and be sure to contact your doctor if:    · Your tinnitus moves from both ears to one ear.     · Your hearing loss gets worse within 1 day after an ear injury.     · Your tinnitus or hearing loss does not get better within 1 week after an ear injury.     · Your tinnitus bothers you enough that you want to take medicines to help you cope with it. Where can you learn more? Go to http://anna-alistair.info/. Enter S165 in the search box to learn more about \"Tinnitus: Care Instructions. \"  Current as of: May 12, 2017  Content Version: 11.7  © 2783-2527 [employer]. Care instructions adapted under license by [employer] (which disclaims liability or warranty for this information). If you have questions about a medical condition or this instruction, always ask your healthcare professional. [place] [address] any warranty or liability for your use of this information.

## 2018-08-20 NOTE — PATIENT INSTRUCTIONS
Allergies: Care Instructions  Your Care Instructions    Allergies occur when your body's defense system (immune system) overreacts to certain substances. The immune system treats a harmless substance as if it were a harmful germ or virus. Many things can cause this overreaction, including pollens, medicine, food, dust, animal dander, and mold. Allergies can be mild or severe. Mild allergies can be managed with home treatment. But medicine may be needed to prevent problems. Managing your allergies is an important part of staying healthy. Your doctor may suggest that you have allergy testing to help find out what is causing your allergies. When you know what things trigger your symptoms, you can avoid them. This can prevent allergy symptoms and other health problems. For severe allergies that cause reactions that affect your whole body (anaphylactic reactions), your doctor may prescribe a shot of epinephrine to carry with you in case you have a severe reaction. Learn how to give yourself the shot and keep it with you at all times. Make sure it is not . Follow-up care is a key part of your treatment and safety. Be sure to make and go to all appointments, and call your doctor if you are having problems. It's also a good idea to know your test results and keep a list of the medicines you take. How can you care for yourself at home? · If you have been told by your doctor that dust or dust mites are causing your allergy, decrease the dust around your bed:  [facility] [employer] sheets, pillowcases, and other bedding in hot water every week. ¨ Use dust-proof covers for pillows, duvets, and mattresses. Avoid plastic covers because they tear easily and do not \"breathe. \" Wash as instructed on the label. ¨ Do not use any blankets and pillows that you do not need. ¨ Use blankets that you can wash in your washing machine. ¨ Consider removing drapes and carpets, which attract and hold dust, from your bedroom.   · If you are allergic to house dust and mites, do not use home humidifiers. Your doctor can suggest ways you can control dust and mites. · Look for signs of cockroaches. Cockroaches cause allergic reactions. Use cockroach baits to get rid of them. Then, clean your home well. Cockroaches like areas where grocery bags, newspapers, empty bottles, or cardboard boxes are stored. Do not keep these inside your home, and keep trash and food containers sealed. Seal off any spots where cockroaches might enter your home. · If you are allergic to mold, get rid of furniture, rugs, and drapes that smell musty. Check for mold in the bathroom. · If you are allergic to outdoor pollen or mold spores, use air-conditioning. Change or clean all filters every month. Keep windows closed. · If you are allergic to pollen, stay inside when pollen counts are high. Use a vacuum  with a HEPA filter or a double-thickness filter at least two times each week. · Stay inside when air pollution is bad. Avoid paint fumes, perfumes, and other strong odors. · Avoid conditions that make your allergies worse. Stay away from smoke. Do not smoke or let anyone else smoke in your house. Do not use fireplaces or wood-burning stoves. · If you are allergic to your pets, change the air filter in your furnace every month. Use high-efficiency filters. · If you are allergic to pet dander, keep pets outside or out of your bedroom. Old carpet and cloth furniture can hold a lot of animal dander. You may need to replace them. When should you call for help? Give an epinephrine shot if:    · You think you are having a severe allergic reaction.     · You have symptoms in more than one body area, such as mild nausea and an itchy mouth.    After giving an epinephrine shot call 911, even if you feel better.   Call 911 if:    · You have symptoms of a severe allergic reaction. These may include:  ¨ Sudden raised, red areas (hives) all over your body.   ¨ Swelling of the throat, mouth, lips, or tongue. ¨ Trouble breathing. ¨ Passing out (losing consciousness). Or you may feel very lightheaded or suddenly feel weak, confused, or restless.     · You have been given an epinephrine shot, even if you feel better.    Call your doctor now or seek immediate medical care if:    · You have symptoms of an allergic reaction, such as:  ¨ A rash or hives (raised, red areas on the skin). ¨ Itching. ¨ Swelling. ¨ Belly pain, nausea, or vomiting.    Watch closely for changes in your health, and be sure to contact your doctor if:    · You do not get better as expected. Where can you learn more? Go to http://annaHubPagesalistair.info/. Enter U744 in the search box to learn more about \"Allergies: Care Instructions. \"  Current as of: October 6, 2017  Content Version: 11.7  © 6971-5813 [employer]. Care instructions adapted under license by [employer] (which disclaims liability or warranty for this information). If you have questions about a medical condition or this instruction, always ask your healthcare professional. [place] [address] any warranty or liability for your use of this information. Advise patient to start taking Over The Counter allergy medication (Allegra, Zyrtec, Claritin, Xyzal or Alavert) daily. If you have itchy, watery eyes you can try OTC Zaditor or Zyrtec Allergy Eye drops. If you have a stuffy nose, please try OTC Flonase, Flonase Sensimist, Rhinocort, or Nasacort AQ 2 squirts up each nostril once a day (adults) or 1 squirt up each nostril once a day (children). Use allergy free, dye free, fragrance free products on your body and hair. After being outdoors, brush hair vigorously, wash face and arms, rinse nostrils with a nasal saline spray and consider changing your clothing. Dust furniture frequently and wear a mask while doing it. Vacuum floors weekly. Remove stuffed animals or extra pillows from the bed. Clean bedding in hot water weekly. Sometimes allergies can be so severe that 2 nasal sprays and 2 pills may be needed to control symptoms. Advised protocol for clearing congestion:  Increase fluid intake, especially water to thin mucous and boost the immune system. Avoid sugar and dairy while congested since they thicken mucous. Get plenty of rest!  Gargle 3 times daily and as needed in Listerine or warm salt water vinegar solutions (1 tsp salt, 1 tsp vinegar in 1 cup lukewarm water.)  Use OTC nasal saline spray up each nostril twice daily. Use humidifier at bedtime. Use OTC Mucinex 600 mg twice daily to loosen mucous. Use OTC Tylenol Arthritis or Ibuprofen up to 800 mg up to 3 times daily as needed for pain, fever or headaches. Avoid decongestants and Ibuprofen if you have high blood pressure! If mucous is consistently discolored yellow or green throughout the day for more than a week, call the doctor for an evaluation. Allergies: Care Instructions  Your Care Instructions    Allergies occur when your body's defense system (immune system) overreacts to certain substances. The immune system treats a harmless substance as if it were a harmful germ or virus. Many things can cause this overreaction, including pollens, medicine, food, dust, animal dander, and mold. Allergies can be mild or severe. Mild allergies can be managed with home treatment. But medicine may be needed to prevent problems. Managing your allergies is an important part of staying healthy. Your doctor may suggest that you have allergy testing to help find out what is causing your allergies. When you know what things trigger your symptoms, you can avoid them. This can prevent allergy symptoms and other health problems. For severe allergies that cause reactions that affect your whole body (anaphylactic reactions), your doctor may prescribe a shot of epinephrine to carry with you in case you have a severe reaction.  Learn how to give yourself the shot and keep it with you at all times. Make sure it is not . Follow-up care is a key part of your treatment and safety. Be sure to make and go to all appointments, and call your doctor if you are having problems. It's also a good idea to know your test results and keep a list of the medicines you take. How can you care for yourself at home? · If you have been told by your doctor that dust or dust mites are causing your allergy, decrease the dust around your bed:  [facility] [employer] sheets, pillowcases, and other bedding in hot water every week. ¨ Use dust-proof covers for pillows, duvets, and mattresses. Avoid plastic covers because they tear easily and do not \"breathe. \" Wash as instructed on the label. ¨ Do not use any blankets and pillows that you do not need. ¨ Use blankets that you can wash in your washing machine. ¨ Consider removing drapes and carpets, which attract and hold dust, from your bedroom. · If you are allergic to house dust and mites, do not use home humidifiers. Your doctor can suggest ways you can control dust and mites. · Look for signs of cockroaches. Cockroaches cause allergic reactions. Use cockroach baits to get rid of them. Then, clean your home well. Cockroaches like areas where grocery bags, newspapers, empty bottles, or cardboard boxes are stored. Do not keep these inside your home, and keep trash and food containers sealed. Seal off any spots where cockroaches might enter your home. · If you are allergic to mold, get rid of furniture, rugs, and drapes that smell musty. Check for mold in the bathroom. · If you are allergic to outdoor pollen or mold spores, use air-conditioning. Change or clean all filters every month. Keep windows closed. · If you are allergic to pollen, stay inside when pollen counts are high. Use a vacuum  with a HEPA filter or a double-thickness filter at least two times each week. · Stay inside when air pollution is bad.  Avoid paint fumes, perfumes, and other strong odors. · Avoid conditions that make your allergies worse. Stay away from smoke. Do not smoke or let anyone else smoke in your house. Do not use fireplaces or wood-burning stoves. · If you are allergic to your pets, change the air filter in your furnace every month. Use high-efficiency filters. · If you are allergic to pet dander, keep pets outside or out of your bedroom. Old carpet and cloth furniture can hold a lot of animal dander. You may need to replace them. When should you call for help? Give an epinephrine shot if:    · You think you are having a severe allergic reaction.     · You have symptoms in more than one body area, such as mild nausea and an itchy mouth.    After giving an epinephrine shot call 911, even if you feel better.   Call 911 if:    · You have symptoms of a severe allergic reaction. These may include:  ¨ Sudden raised, red areas (hives) all over your body. ¨ Swelling of the throat, mouth, lips, or tongue. ¨ Trouble breathing. ¨ Passing out (losing consciousness). Or you may feel very lightheaded or suddenly feel weak, confused, or restless.     · You have been given an epinephrine shot, even if you feel better.    Call your doctor now or seek immediate medical care if:    · You have symptoms of an allergic reaction, such as:  ¨ A rash or hives (raised, red areas on the skin). ¨ Itching. ¨ Swelling. ¨ Belly pain, nausea, or vomiting.    Watch closely for changes in your health, and be sure to contact your doctor if:    · You do not get better as expected. Where can you learn more? Go to http://anna-alistair.info/. Enter M361 in the search box to learn more about \"Allergies: Care Instructions. \"  Current as of: October 6, 2017  Content Version: 11.7  © 4913-2882 [employer]. Care instructions adapted under license by [employer] (which disclaims liability or warranty for this information).  If you have questions about a medical condition or this instruction, always ask your healthcare professional. [address] any warranty or liability for your use of this information. Tinnitus: Care Instructions  Your Care Instructions    Many people have some ringing sounds in their ears once in a while. You may hear a roar, a hiss, a tinkle, or a buzz. The sound usually lasts only a few minutes. If it goes on all the time, you may have tinnitus. Tinnitus is usually caused by long-term exposure to loud noise. This damages the nerves in the inner ear. It can occur with all types of hearing loss. It may be a symptom of almost any ear problem. Tinnitus may be caused by a buildup of earwax. Or it may be caused by ear infections or certain medicines (especially antibiotics or large amounts of aspirin). You can also hear noises in your ears because of an injury to the ears, drinking too much alcohol or caffeine, or a medical condition. You may need tests to evaluate your hearing and to find causes of long-lasting tinnitus. Your doctor may suggest one or more treatments to help you cope with it. You can also do things at home to help reduce symptoms. Follow-up care is a key part of your treatment and safety. Be sure to make and go to all appointments, and call your doctor if you are having problems. It's also a good idea to know your test results and keep a list of the medicines you take. How can you care for yourself at home? · Limit or cut out alcohol and caffeine. They can make your symptoms worse. · Do not smoke or use other tobacco products. Nicotine reduces blood flow to the ear and makes tinnitus worse. If you need help quitting, talk to your doctor about stop-smoking programs and medicines. These can increase your chances of quitting for good. · Talk to your doctor about whether to stop taking aspirin and similar products such as ibuprofen or naproxen. · Get exercise often.  It can improve blood flow to the ear. Ways to cope with noise  Some tinnitus may last a long time. To cope with noise, try to:  · Avoid noises that you think caused your tinnitus. If you can't avoid loud noises, wear earplugs or earmuffs. · Ignore the sound by paying attention to other things. · Relax using biofeedback, meditation, or yoga. Feeling stressed and being tired can make tinnitus worse. · Play music or white noise to help you sleep. Background noise may cover up the noise that you hear in your ears. You can buy a machine that makes soothing sounds, such as ocean waves. When should you call for help? Call 911 anytime you think you may need emergency care. For example, call if:    · You have symptoms of a stroke. These may include:  ¨ Sudden numbness, tingling, weakness, or loss of movement in your face, arm, or leg, especially on only one side of your body. ¨ Sudden vision changes. ¨ Sudden trouble speaking. ¨ Sudden confusion or trouble understanding simple statements. ¨ Sudden problems with walking or balance. ¨ A sudden, severe headache that is different from past headaches.    Call your doctor now or seek immediate medical care if:    · You develop other symptoms. These may include hearing loss (or worse hearing loss), balance problems, dizziness, nausea, or vomiting.    Watch closely for changes in your health, and be sure to contact your doctor if:    · Your tinnitus moves from both ears to one ear.     · Your hearing loss gets worse within 1 day after an ear injury.     · Your tinnitus or hearing loss does not get better within 1 week after an ear injury.     · Your tinnitus bothers you enough that you want to take medicines to help you cope with it. Where can you learn more? Go to http://anna-alistair.info/. Enter S165 in the search box to learn more about \"Tinnitus: Care Instructions. \"  Current as of:  May 12, 2017  Content Version: 11.7  © 6066-2815 [employer]. Care instructions adapted under license by [employer] (which disclaims liability or warranty for this information). If you have questions about a medical condition or this instruction, always ask your healthcare professional. [address] any warranty or liability for your use of this information.

## 2018-10-02 ENCOUNTER — OFFICE VISIT (OUTPATIENT)
Dept: FAMILY MEDICINE CLINIC | Age: 33
End: 2018-10-02

## 2018-10-02 VITALS
HEART RATE: 102 BPM | TEMPERATURE: 97.9 F | BODY MASS INDEX: 37.37 KG/M2 | OXYGEN SATURATION: 95 % | DIASTOLIC BLOOD PRESSURE: 79 MMHG | HEIGHT: 63 IN | SYSTOLIC BLOOD PRESSURE: 120 MMHG | WEIGHT: 210.9 LBS | RESPIRATION RATE: 16 BRPM

## 2018-10-02 DIAGNOSIS — R63.4 WEIGHT LOSS: ICD-10-CM

## 2018-10-02 DIAGNOSIS — K59.09 CHRONIC CONSTIPATION: Primary | ICD-10-CM

## 2018-10-02 DIAGNOSIS — R73.9 HYPERGLYCEMIA: ICD-10-CM

## 2018-10-02 DIAGNOSIS — E03.4 HYPOTHYROIDISM DUE TO ACQUIRED ATROPHY OF THYROID: ICD-10-CM

## 2018-10-02 DIAGNOSIS — E16.1 HYPERINSULINEMIA: ICD-10-CM

## 2018-10-02 DIAGNOSIS — E55.9 VITAMIN D DEFICIENCY: ICD-10-CM

## 2018-10-02 DIAGNOSIS — E66.9 OBESITY (BMI 35.0-39.9 WITHOUT COMORBIDITY): ICD-10-CM

## 2018-10-02 RX ORDER — PEN NEEDLE, DIABETIC 31 GX3/16"
NEEDLE, DISPOSABLE MISCELLANEOUS
Qty: 100 PEN NEEDLE | Refills: 3 | Status: SHIPPED | OUTPATIENT
Start: 2018-10-02 | End: 2018-10-31

## 2018-10-02 RX ORDER — PHENTERMINE HYDROCHLORIDE 37.5 MG/1
37.5 TABLET ORAL
Qty: 30 TAB | Refills: 0 | Status: SHIPPED | OUTPATIENT
Start: 2018-10-02 | End: 2018-11-06 | Stop reason: ALTCHOICE

## 2018-10-02 NOTE — PROGRESS NOTES
HISTORY OF PRESENT ILLNESS Irina Sosa is a 28 y.o. female presents with Weight Management; Blood Pressure Check; Medication Refill (phentermine); and Constipation Agree with nurse note. Pt with hypothyroidism, hyperinsulinemia, vit d deficiency, hyperglycemia, and obesity presents to the office with a BP of 120/79. She is tolerating Phentermine 37.5 mg well, month 1 of 3. Last prescribed on 8/20/2018. She has noticed an increase of energy and decreased appetite since starting the medication. She lost 8 pounds since the last visit. Percent body fat has changed from 38.8% to 43.6%, waist circumference measurement has changed from 43.5\" to 42\". To note, there was a different nursing assisting with measurements at last visit. She has increased water intake to 6 bottles daily. She has 1 cup of coffee with 3 packets of powdered creamer and 3 packets of sugar. She is going to start working out at the gym in her office 2 days a week with a coworker and then increase to 3 days a week. She has increased protein in her diet. She eats BABADU by [employer] peanut butter meal bar for breakfast with 15 g of protein, 190 calories and soy protein isolate. Lunch is a protein shake. Dinner is a [employer] half salad or soup, or chicken breast with broccoli. She is interested is starting [place] because her sister has done well on it. She would like a rx to see if it is covered by her insurance. If it is not, she will continue with Phentermine. On 7/6/2018, Trigs were 67. Pt with chronic constipation. She only has 3 BM weekly since starting the medication. She has increased water and tried to increase fiber in the diet. Patient denies fatigue, sleep disturbance, chest pain, heart palpitations, nausea, dry mouth, signs of depression. Written by aakash Nur, as dictated by DO. MARIA G Kaur Review of Systems negative except as noted above in HPI. ALLERGIES:   
Allergies Allergen Reactions  Latex Itching  Other Medication Rash [id number]  Metformin Diarrhea  
  severe  Mirena [Levonorgestrel] Other (comments) Severe abdominal pain GALACTORRHEA CURRENT MEDICATIONS:   
Outpatient Prescriptions Marked as Taking for the 10/2/18 encounter (Office Visit) with Eyal Deshpande DO Medication Sig Dispense Refill  phentermine (ADIPEX-P) 37.5 mg tablet Take 1 Tab by mouth every morning. Max Daily Amount: 37.5 mg. Indications: WEIGHT LOSS MANAGEMENT FOR OBESE PATIENT (BMI >= 30) 30 Tab 0  
 liraglutide (SAXENDA) 3 mg/0.5 mL (18 mg/3 mL) pen 0.5 mL by SubCUTAneous route daily. 0.6 mg daily x 1 wk then 1.2 mg daily x 1 wk then 1.8 mg daily x 1 wk then 2.4 mg daily x 1 wk then 3.0 mg daily  Indications: WEIGHT LOSS MANAGEMENT FOR OBESE PATIENT (BMI >= 30) 5 Pen 3  
 Insulin Needles, Disposable, (LINDA PEN NEEDLE) 32 gauge x 5/32\" ndle Use daily as directed 100 Pen Needle 3  
 ergocalciferol (ERGOCALCIFEROL) 50,000 unit capsule Take 1 Cap by mouth every seven (7) days for 60 days. For low vit D. Take for 2 months then start over the counter vit D 1000 units daily. 5 Cap 2  
 levothyroxine (SYNTHROID) 125 mcg tablet Take 1 Tab by mouth Daily (before breakfast). Indications: hypothyroidism 30 Tab 5 PAST MEDICAL HISTORY:   
Past Medical History:  
Diagnosis Date  Allergy, unspecified not elsewhere classified  Ankle sprain 2002  
 bilaterally. 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 Dr. Henry Subramanian, OB. Pieter Schultz, Genetic Counselor  Dysmenorrhea Dr. Jordan Rocha.  Elevated liver enzymes 2009  
 Heart palpitations 07/12/2016 Exertional.  Dr. Amirah Subramanian. Negative Exercise Stress Test.  
 Hyperinsulinemia 5/22/2015  Hypothyroidism due to acquired atrophy of thyroid 2011  Morbid obesity due to excess calories ([address] 2/28/2017  Obesity, Class II, BMI 35-39.9, with comorbidity 2/28/2017  Pre-diabetes 2017 Previous on Metformin for treatment.  Pure hypercholesterolemia 2013 PAST SURGICAL HISTORY:   
Past Surgical History:  
Procedure Laterality Date  HX  SECTION    HX DILATION AND CURETTAGE  2016  
 due to Cystic Hygroma/Terrell's Syndrome fetus. Dr. Xavier Costello  HX GYN   (placed),  (removed) IUD. Dr. Meet Dubon.  HX TONSIL AND ADENOIDECTOMY  childhood  HX WISDOM TEETH EXTRACTION  2016 All 4 removed. FAMILY HISTORY:   
Family History Problem Relation Age of Onset Abdifatah Freud Arthritis-osteo Mother  Hypertension Mother  Migraines Mother Abdifatah Freud Other Mother IBS  Hypertension Maternal Uncle  Asthma Maternal Uncle  Hypertension Maternal Grandmother  Other Maternal Grandmother   
  diverticulosis  Arthritis-osteo Maternal Grandmother  Cancer Maternal Grandfather   
  prostate  Arthritis-osteo Maternal Grandfather  Other Maternal Aunt   
  uterine fibroids  Obesity Other SOCIAL HISTORY:   
Social History Social History  Marital status: SINGLE Spouse name: N/A  
 Number of children: 1  Years of education: N/A Social History Main Topics  Smoking status: Never Smoker  Smokeless tobacco: Never Used  Alcohol use 0.6 oz/week 1 Glasses of wine, 0 Standard drinks or equivalent per week Comment: [place] - 1 every 2 weeks  Drug use: No  
 Sexual activity: Yes  
  Partners: Male Birth control/ protection: Condom Other Topics Concern  None Social History Narrative IMMUNIZATIONS:   
Immunization History Administered Date(s) Administered  Influenza Vaccine 10/31/2013, 10/03/2016 PHYSICAL EXAMINATION Vital Signs Visit Vitals  /79 (BP 1 Location: Left arm, BP Patient Position: Sitting)  Pulse (!) 102  Temp 97.9 °F (36.6 °C) (Temporal)  Resp 16  
 Ht 5' 3\" (1.6 m)  Wt 210 lb 14.4 oz (95.7 kg)  LMP 09/19/2018 (Approximate)  SpO2 95%  BMI 37.36 kg/m2 Weight Metrics 10/2/2018 10/2/2018 8/20/2018 6/19/2018 6/19/2018 8/15/2017 8/15/2017 Weight - 210 lb 14.4 oz 218 lb 14.4 oz - 219 lb 12.8 oz - 192 lb 9.6 oz Neck Circ (inches) - - - - - - - Waist Measure Inches 42 - - 43.5 - 38.5 - Body Fat % 43.6 - - 38.8 - 39.2 - BMI - 37.36 kg/m2 38.07 kg/m2 - 38.94 kg/m2 - 34.12 kg/m2 General appearance - Well nourished. Well appearing. Well developed. No acute distress. Obese. Head - Normocephalic. Atraumatic. Eyes - pupils equal and reactive, extraocular eye movements intact, sclera anicteric Ears - Hearing is grossly normal bilaterally. Nose - normal and patent, no erythema, discharge or polyps Mouth - mucous membranes moist, pharynx normal with cobblestone appearance. No erythema, white exudate or obstruction. Neck - supple. Midline trachea. No carotid bruits are noted. No thyromegaly noted. Chest - clear to auscultation bilaterally anterriorly and posteriorly. No wheezes, rales or rhonchi. Breath sounds are symmetrical and unlabored bilaterally. Heart - normal rate. Regular rhythm, normal S1, S2. No murmur. No rubs, clicks or gallops noted. Abdomen - soft and distended. No masses or organomegaly. No rebound, rigidity or guarding. Bowel sounds normal x 4 quadrants. No tenderness noted. Neurological - awake, alert and oriented to person, place, and time and event. Cranial nerves II through XII intact. Muscle strength is +5/5 x 4 extremities. Sensation is intact to light touch bilaterally. Steady gait. Heme/Lymph - peripheral pulses normal x 4 extremities. No peripheral edema is noted. No cervical adenopathy noted. Skin - no rashes, erythema, ecchymosis, lacerations, abrasions, suspicious moles noted. No skin tags. No acanthosis nigricans noted in the axilla or neck. Psychological -   normal behavior, speech, dress and thought processes. Good insight. Good eye contact. Normal affect. Appropriate mood. DATA REVIEWED Lab Results Component Value Date/Time WBC 9.4 02/01/2017 10:00 AM  
 Hemoglobin (POC) 13.6 02/21/2011 03:39 PM  
 HGB 13.4 02/01/2017 10:00 AM  
 Hematocrit (POC) 40 02/21/2011 03:39 PM  
 HCT 41 02/01/2017 10:00 AM  
 PLATELET 509 (H) 53/72/9093 10:00 AM  
 MCV 79 (L) 02/01/2017 10:00 AM  
 
Lab Results Component Value Date/Time Sodium 139 07/06/2018 08:04 AM  
 Potassium 4.6 07/06/2018 08:04 AM  
 Chloride 102 07/06/2018 08:04 AM  
 CO2 24 07/06/2018 08:04 AM  
 Anion gap 13 02/01/2017 10:00 AM  
 Glucose 97 07/06/2018 08:04 AM  
 BUN 4 (L) 07/06/2018 08:04 AM  
 Creatinine 0.68 07/06/2018 08:04 AM  
 BUN/Creatinine ratio 6 (L) 07/06/2018 08:04 AM  
 GFR est  07/06/2018 08:04 AM  
 GFR est non- 07/06/2018 08:04 AM  
 Calcium 9.6 07/06/2018 08:04 AM  
 Bilirubin, total 0.3 07/06/2018 08:04 AM  
 AST (SGOT) 14 07/06/2018 08:04 AM  
 Alk. phosphatase 61 07/06/2018 08:04 AM  
 Protein, total 7.1 07/06/2018 08:04 AM  
 Albumin 4.3 07/06/2018 08:04 AM  
 Globulin 3.9 05/31/2015 07:58 PM  
 A-G Ratio 1.5 07/06/2018 08:04 AM  
 ALT (SGPT) 16 07/06/2018 08:04 AM  
 
Lab Results Component Value Date/Time Cholesterol, total 199 07/06/2018 08:04 AM  
 HDL Cholesterol 41 07/06/2018 08:04 AM  
 LDL, calculated 145 (H) 07/06/2018 08:04 AM  
 VLDL, calculated 13 07/06/2018 08:04 AM  
 Triglyceride 67 07/06/2018 08:04 AM  
 
Lab Results Component Value Date/Time Vitamin D 25-Hydroxy 12 (L) 02/01/2017 10:00 AM  
 VITAMIN D, 25-HYDROXY 14.3 (L) 07/06/2018 08:04 AM  
   
Lab Results Component Value Date/Time Hemoglobin A1c 5.6 07/06/2018 08:04 AM  
 
Lab Results Component Value Date/Time TSH 0.766 07/06/2018 08:04 AM  
 TSH 9.22 (H) 02/01/2017 10:00 AM  
 
Lab Results Component Value Date/Time  Microalb/Creat ratio (ug/mg creat.) 3.6 05/27/2016 10:16 AM  
 
 
 
ASSESSMENT and PLAN 
 ICD-10-CM ICD-9-CM 1. Chronic constipation K59.09 564.00   
2. Hypothyroidism due to acquired atrophy of thyroid E03.4 244.8   
  246.8   
 slightly overtreated 3. Hyperinsulinemia E16.1 251.1 4. Vitamin D deficiency E55.9 268.9 VITAMIN D, 25 HYDROXY  
 improving with Rx supplementation 5. Hyperglycemia R73.9 790.29   
 stable 6. Weight loss R63.4 783.21   
 8# since 8/20/2018 7. Obesity (BMI 35.0-39.9 without comorbidity) E66.9 278.00 phentermine (ADIPEX-P) 37.5 mg tablet Continue current medications and care. Brochure for [employer] given to pt today. Demonstrated injections. Pt will start [employer] if covered by insurance. If not, she will continue with Phentermine. Prescriptions written and given to patient (Phentermine 37.5 mg, month 2 of 3.) Saxenda 3 mg injection and pens. Medication side effects discussed. [facility]  reviewed and pt is compliant. Discussed the patient's BMI with her. The BMI follow up plan is as follows: I have counseled this patient on diet and exercise regimens. Diet recommendations:  Decrease carbohydrates (white foods including flour, white bread, white rice, white pasta, white potatoes; corn, sweet foods, sweet drinks and alcohol), increase green leafy vegetables and protein with each meal.  Avoid fried foods. Eat 3-5 small meals daily. Do not skip meals. Ok to use meal replacements up to twice daily with protein goal of 60 mg per meal.   Recommend OTC Premiere Protein bars or shakes. Increase water intake. Increase physical activity to 30 minutes daily for health benefit or 60 minutes daily to prevent weight regain, as tolerated. Physical Activity prescription:  A goal of 30 minutes physical activity daily is recommended for health benefit and at least 60 minutes daily to prevent weight regain.   For weight loss, no less than 75% needs to be aerobic (i.e. Walking) and no more than 25% resistance exercising (i.e. Weight lifting). For weight maintenance phase, 50% aerobic and 50% resistance exercises. Sleep prescription:    A goal of 7-8 hours of uninterrupted sleep is recommended to turn off the Grehlin hormone to be released from the stomach and triggers appetite while promoting weight gain. Proper rest turns on Leptin hormone to be released from white adipose tissue and promotes weight loss. Counseled patient on: weight loss goals, emphasizing a 5-10% weight loss in 6-12 months and strategies, cholesterol, hyperglycemia, vit d deficiency, and thyroid. Immunizations noted. Advise flu vaccine between the months September to December. Praised pt for weight loss efforts and progress. Patient was offered a choice/choices in the treatment plan today. Patient expresses understanding of the plan and agrees with recommendations. Patient declines any additional handouts. Patient is satisfied with previous handouts received from our office Follow-up Disposition: 
Return in about 1 month (around 11/2/2018) for med refills, weight, blood pressure. Written by aakash Webb, as dictated by Dr. Carlos A Bolton DO. Documentation True and Accepted by Lani Jaime.  John Hickman.

## 2018-10-02 NOTE — MR AVS SNAPSHOT
[address] [facility] 
 
 
 [address] 
[address] [facility] [address] 
[phone] Patient: Nery Mas MRN: [id number] :1985 Visit Information Date & Time Provider Department Dept. Phone Encounter #  
 10/2/2018  3:00 PM DO [facility] [phone] [id number] Follow-up Instructions Return in about 1 month (around 2018) for med refills, weight, blood pressure. Your Appointments 2018  8:00 AM  
LAB with LAB [facility] (REKHA Henderson) Appt Note: fasting lab [address] [facility] [address]  
[phone]  
  
   
 [address] [facility] [address] [place] [address] Upcoming Health Maintenance Date Due  
 PAP AKA CERVICAL CYTOLOGY 10/18/2018* Influenza Age 5 to Adult 2018* DTaP/Tdap/Td series (2 - Td) 4/10/2027 *Topic was postponed. The date shown is not the original due date. Allergies as of 10/2/2018  Review Complete On: 10/2/2018 By: Wendy Napier Severity Noted Reaction Type Reactions Latex Low 2015    Itching Other Medication High 02/15/2017    Rash [address] [facility] Metformin  02/15/2017   Intolerance Diarrhea  
 severe Mirena [Levonorgestrel]  10/31/2013    Other (comments) Severe abdominal pain GALACTORRHEA Current Immunizations  Reviewed on 10/2/2018 Name Date Influenza Vaccine 10/3/2016, 10/31/2013 Reviewed by Susana Fitzgerald DO on 10/2/2018 at  4:11 PM  
You Were Diagnosed With   
  
 Codes Comments Chronic constipation    -  Primary ICD-10-CM: K59.09 
ICD-9-CM: 564.00 Hypothyroidism due to acquired atrophy of thyroid     ICD-10-CM: E03.4 ICD-9-CM: 244.8, 246.8 slightly overtreated Hyperinsulinemia     ICD-10-CM: E16.1 ICD-9-CM: 251.1 Vitamin D deficiency     ICD-10-CM: E55.9 ICD-9-CM: 268.9 improving with Rx supplementation Hyperglycemia     ICD-10-CM: R73.9 ICD-9-CM: 790.29 stable Weight loss     ICD-10-CM: R63.4 ICD-9-CM: 783.21 8# since 8/20/2018 Obesity (BMI 35.0-39.9 without comorbidity)     ICD-10-CM: F64.9 ICD-9-CM: 278.00 Vitals BP Pulse Temp Resp Height(growth percentile) Weight(growth percentile) 120/79 (BP 1 Location: Left arm, BP Patient Position: Sitting) (!) 102 97.9 °F (36.6 °C) (Temporal) 16 5' 3\" (1.6 m) 210 lb 14.4 oz (95.7 kg) LMP SpO2 BMI OB Status Smoking Status 09/19/2018 (Approximate) 95% 37.36 kg/m2 Having regular periods Never Smoker Vitals History BMI and BSA Data Body Mass Index Body Surface Area  
 37.36 kg/m 2 2.06 m 2 Preferred Pharmacy Pharmacy Name Phone [id number]- [place], [place] - [address] [phone] Your Updated Medication List  
  
   
This list is accurate as of 10/2/18  4:12 PM.  Always use your most recent med list.  
  
  
  
  
 ergocalciferol 50,000 unit capsule Commonly known as:  ERGOCALCIFEROL Take 1 Cap by mouth every seven (7) days for 60 days. For low vit D. Take for 2 months then start over the counter vit D 1000 units daily. Insulin Needles (Disposable) 32 gauge x 5/32\" Ndle Commonly known as:  Danya Pen Needle Use daily as directed  
  
 levothyroxine 125 mcg tablet Commonly known as:  SYNTHROID Take 1 Tab by mouth Daily (before breakfast). Indications: hypothyroidism  
  
 liraglutide 3 mg/0.5 mL (18 mg/3 mL) pen Commonly known as:  SAXENDA  
0.5 mL by SubCUTAneous route daily. 0.6 mg daily x 1 wk then 1.2 mg daily x 1 wk then 1.8 mg daily x 1 wk then 2.4 mg daily x 1 wk then 3.0 mg daily  Indications: WEIGHT LOSS MANAGEMENT FOR OBESE PATIENT (BMI >= 30) phentermine 37.5 mg tablet Commonly known as:  ADIPEX-P Take 1 Tab by mouth every morning. Max Daily Amount: 37.5 mg.  Indications: WEIGHT LOSS MANAGEMENT FOR OBESE PATIENT (BMI >= 30) Prescriptions Printed Refills  
 phentermine (ADIPEX-P) 37.5 mg tablet 0 Sig: Take 1 Tab by mouth every morning. Max Daily Amount: 37.5 mg. Indications: WEIGHT LOSS MANAGEMENT FOR OBESE PATIENT (BMI >= 30) Class: Print Route: Oral  
 liraglutide (SAXENDA) 3 mg/0.5 mL (18 mg/3 mL) pen 3 Si.5 mL by SubCUTAneous route daily. 0.6 mg daily x 1 wk then 1.2 mg daily x 1 wk then 1.8 mg daily x 1 wk then 2.4 mg daily x 1 wk then 3.0 mg daily  Indications: WEIGHT LOSS MANAGEMENT FOR OBESE PATIENT (BMI >= 30) Class: Print Route: SubCUTAneous Prescriptions Sent to Pharmacy Refills Insulin Needles, Disposable, (LINDA PEN NEEDLE) 32 gauge x \" ndle 3 Sig: Use daily as directed Class: Normal  
 Pharmacy: [facility]/pharmacy [address] [place] [address] [address] Ph #: [phone] We Performed the Following VITAMIN D, 25 HYDROXY [id number] CPT(R)] Follow-up Instructions Return in about 1 month (around 2018) for med refills, weight, blood pressure. Introducing [facility]! Dear [facility]: Thank you for requesting a Deed account. Our records indicate that you already have an active Deed account. You can access your account anytime at [email or web address] Did you know that you can access your hospital and ER discharge instructions at any time in Deed? You can also review all of your test results from your hospital stay or ER visit. Additional Information If you have questions, please visit the Frequently Asked Questions section of the Deed website at [email or web address] Remember, Deed is NOT to be used for urgent needs. For medical emergencies, dial 911. Now available from your iPhone and Android! Please provide this summary of care documentation to your next provider. Your primary care clinician is listed as Hector Batres. If you have any questions after today's visit, please call [phone].

## 2018-11-06 ENCOUNTER — OFFICE VISIT (OUTPATIENT)
Dept: FAMILY MEDICINE CLINIC | Age: 33
End: 2018-11-06

## 2018-11-06 VITALS
HEIGHT: 63 IN | WEIGHT: 211.3 LBS | RESPIRATION RATE: 18 BRPM | HEART RATE: 94 BPM | OXYGEN SATURATION: 99 % | SYSTOLIC BLOOD PRESSURE: 98 MMHG | DIASTOLIC BLOOD PRESSURE: 64 MMHG | TEMPERATURE: 97.7 F | BODY MASS INDEX: 37.44 KG/M2

## 2018-11-06 DIAGNOSIS — E03.4 HYPOTHYROIDISM DUE TO ACQUIRED ATROPHY OF THYROID: ICD-10-CM

## 2018-11-06 DIAGNOSIS — K59.09 CHRONIC CONSTIPATION: ICD-10-CM

## 2018-11-06 DIAGNOSIS — E66.9 OBESITY (BMI 35.0-39.9 WITHOUT COMORBIDITY): ICD-10-CM

## 2018-11-06 DIAGNOSIS — E78.00 PURE HYPERCHOLESTEROLEMIA: ICD-10-CM

## 2018-11-06 DIAGNOSIS — E16.1 HYPERINSULINEMIA: Primary | ICD-10-CM

## 2018-11-06 DIAGNOSIS — R63.4 WEIGHT LOSS: ICD-10-CM

## 2018-11-06 DIAGNOSIS — R73.9 HYPERGLYCEMIA: ICD-10-CM

## 2018-11-06 DIAGNOSIS — E55.9 VITAMIN D DEFICIENCY: ICD-10-CM

## 2018-11-06 NOTE — PATIENT INSTRUCTIONS
Learning About Low-Carbohydrate Diets for Weight Loss  What is a low-carbohydrate diet? Low-carb diets avoid foods that are high in carbohydrate. These high-carb foods include pasta, bread, rice, cereal, fruits, and starchy vegetables. Instead, these diets usually have you eat foods that are high in fat and protein. Many people lose weight quickly on a low-carb diet. But the early weight loss is water. People on this diet often gain the weight back after they start eating carbs again. Not all diet plans are safe or work well. A lot of the evidence shows that low-carb diets aren't healthy. That's because these diets often don't include healthy foods like fruits and vegetables. Losing weight safely means balancing protein, fat, and carbs with every meal and snack. And low-carb diets don't always provide the vitamins, minerals, and fiber you need. If you have a serious medical condition, talk to your doctor before you try any diet. These conditions include kidney disease, heart disease, type 2 diabetes, high cholesterol, and high blood pressure. If you are pregnant, it may not be safe for your baby if you are on a low-carb diet. How can you lose weight safely? You might have heard that a diet plan helped another person lose weight. But that doesn't mean that it will work for you. It is very hard to stay on a diet that includes lots of big changes in your eating habits. If you want to get to a healthy weight and stay there, making healthy lifestyle changes will often work better than dieting. These steps can help. · Make a plan for change. Work with your doctor to create a plan that is right for you. · See a dietitian. He or she can show you how to make healthy changes in your eating habits. · Manage stress. If you have a lot of stress in your life, it can be hard to focus on making healthy changes to your daily habits. · Track your food and activity.  You are likely to do better at losing weight if you keep track of what you eat and what you do. Follow-up care is a key part of your treatment and safety. Be sure to make and go to all appointments, and call your doctor if you are having problems. It's also a good idea to know your test results and keep a list of the medicines you take. Where can you learn more? Go to http://anna-alistair.info/. Enter A121 in the search box to learn more about \"Learning About Low-Carbohydrate Diets for Weight Loss. \"  Current as of: March 29, 2018  Content Version: 11.8  © 9389-9527 [employer]. Care instructions adapted under license by [employer] (which disclaims liability or warranty for this information). If you have questions about a medical condition or this instruction, always ask your healthcare professional. [address] any warranty or liability for your use of this information. WEIGHT LOSS PLAN    1. Read food labels and count calories. Goal 5736-6436 calories daily for women and 5827-6292 calories daily for men. Achieve this by decreasing caloric intake by 500 calories by weekly increments. NOTE:  1 pound = 3500 calories! Www.loseit. com  Www.myfitnesspal.com  Www.Sijibang.com. [employer]  Www.[employer]. gov  Weight watchers mobile    Calories needed to lose 1-2 pounds a week = 10 x your weight in pounds    2. Increase water intake. Per [facility], it is important to drink 1/2 your body weight in ounces of water daily. Decrease your water consumption 2-3 hours before bedtime to prevent sleep disturbance from frequent urination. 3.  Decrease sugary beverages. Each can or glass of soda increases your risk of obesity by 60%. Can lose 10 pounds in a month by avoiding any soda.      12 oz can of soda = 140 calories, 16 oz cup of sweet tea = 200 calories    16 oz orange juice = 200 calories, 10 oz apple juice = 150 calories   32 oz sports drink = 200 calories, 16 oz punch = 240 calories   3.5 oz alcohol = 100-150 calories     4. Avoid High Fructose Corn Syrup products. This ingredient makes products highly addictive! 5. Exercise 30 minutes daily 5 days weekly, minimally. If you burn 3500 calories that equals a pound! Use a pedometer to count steps. Visit www. [employer] for a free pedometer and diet recommendations. Your maximum heart rate = 220 - your age    Never exercise at your maximum heart rate. See handout for target heart rate. 6.  Decrease carbohydrates (white bread, pasta, rice, potatoes, sweet foods and sweet drinks like soda, tea, coffee, juice and sports drinks). Increase fiber and protein. Goal:   calories daily of carbohydrates     Try CHROMIUM PICONATE 200 MG THREE TIMES DAILY,    to decrease Premenstrual carbohydrate cravings. 7.  Eat 3-5 small meals daily, include lots of protein (beans/legumes, nuts, lean meat, eggs) and green vegetables with each. (Breakfast, lunch, dinner with 2 healthy snacks)    8. Get proper rest 7-8 hours uninterrupted. When you get less than 6 hours, it triggers hunger by affecting your Grehlin:Leptin ratio and this results in weight gain. 9.  Watch your food portions. Green leafy vegetables should cover 1/2 of the plate, lean meat 1/4 of the plate, starchy vegetable 1/4 of the plate. Use smaller plates. 10.  Do not eat until you are full. Eat until you are no longer hungry. If you are not sure, try drinking a glass of water before getting your second serving of food. 11.  Do not weigh yourself daily. Wait until your next office visit. Use how you feel and  how your clothes fit as measurements of success. 12.  Address your spirituality to draw strength from above during your journey. Remember \"I am fearfully and wonderfully made. Marvelous in His eyes. \"    13.   Set realistic, appropriate and achievable weight loss goals:     RECOMMENDED TARGET WEIGHT LOSS:  Initial weight loss of 5-10% of your initial body weight achieved over 6 months or a decrease of 2 BMI units. MINIMUM GOAL OF WEIGHT LOSS:  Reduce body weight and maintain a lower body weight. Prevent weight gain. RELATED WEBSITES:      Www.obesityaction. org  (and consider joining the [employer] for $25/year. The [facility] mission is to elevated and empower those affected by obesity through education, advocacy and support. Quarterly journals included in membership fee. )    Www.[employer]  www.This Week In     RELATED DIETS:  Dr. Joaquín Ledesma Weight loss challenge, Mediterranean diet, [address] [place] [address] Watchers, Paleo Diet (anti-inflammatory diet)        EXERCISE 150 MINUTES WEEKLY. THIS CAN BE ACHIEVED BY WORKING OUT OR WALKING A MINIMUM OF 30 MINUTES FOR 5 DAYS WEEKLY. YOU CAN EXERCISE IN INCREMENTS OF 10-15 MINUTES UP TO 3 TIMES A DAY. Consider performing \"brainless exercise. \"  Choose your favorite tv program.  Five minutes before and for 5 minutes after the tv program, stretch your body. While the program is on, walk in place watching the show. When commercials come on, rest or walk around the house to do other things. When the program begins, return to walking in place. When you are able keep walking during the commercials and add light weights to your ankles or hands. By the end of the show, you would have walked 30 minutes. If you need shorter spurts of exercise, walk during the commercials and rest during the show. Drink to glasses of water prior to any exercise to prevent dehydration and to improve the results of the work out. Your RESTING HEART RATE is the number of times your heart beats per minute when you are not exerting yourself. The more fit you are, the lower your resting heart rate will be. Your MAXIMUM HEART RATE is the number of times per minute your heart pumps when it is working at 100% capacity. NEVER EXERCISE AT YOUR MAXIMUM HEART RATE.     MAX HEART RATE = 220 - your age    For example, in a 48year old, the maximum heart rate is 220-50 = 170 beats per minute    Your TARGET HEART RATE is the number of beats per minute our heart should pump during aerobic exercise. Reaching your target heart rate indicates that your body is receiving maximum cardiovascular and fat burning benefits. If you are fit, your TARGET HEART RATE = 70-85% of your maximum Heart rate      For a 48year old with vigorous intensity physical activity,         Target heart rate= 170 bpm x .70 = 119 bpm     Target heart rate = 170 bpm x .85=  145 bpm        Therefore, your target heart rate during physical activity is 119-145      If you are not fit, TARGET HEART RATE = 50-70% of your maximum Heart rate    MODERATE CONSISTENT AEROBIC EXERCISE OR WALKING FOR AT LEAST 150 MINUTES WEEKLY IS AN ESSENTIAL PART OF ANY WEIGHT LOSS OF WEIGHT MAINTENANCE PROGRAM.     During WEIGHT LOSS PHASE, at least 75% of your exercise needs to be walking or moderate aerobic activity and 25% or 0% can be Isometric or Resistance type exercises (the latter can be deferred to the weight maintenance phase.)     During WEIGHT MAINTENANCE PHASE, at least 50% of your exercise needs to be aerobic and 50% Isometric or Resistance type exercises. BENEFITS OF MODERATE INTENSITY EXERCISE MOST DAYS OF THE WEEK:     1. Insulin Resistance improves 30-85%   2. Abdominal Fat decreases by 30%   3. Inflammatory Markers decrease by 30% (therefore pain decreases)   4. Systolic and Diastolic Blood Pressure decrease by 4 mmHg   5. HDL improves by 5%   6. Triglycerides decrease by 15%   7. Shift from small dense LDL to large dense LDL (therefore decrease Insulin Resistance)   8.   Decrease coagulability

## 2018-11-06 NOTE — PROGRESS NOTES
HISTORY OF PRESENT ILLNESS  Art Headley is a 35 y.o. female presents with Medication Refill (saxenda) and Weight Management    Agree with nurse note. Pt with hypothyroidism, hyperinsulinemia, vit d deficiency, obesity, and hypercholesterolemia presents to the office with a BP of 98/64. She started using Saxenda on 10/15/2018. She started with 0.6 mg. When she increased to 1.4 mg, she felt sleepy for 3 days. She is now on the 2.4 mg dosage and tolerating well. She finds she is more hungry. Breakfast is Chobani key lime greek yogurt. Lunch is chicken salad. Dinner is rotisserie chicken and broccoli. She had dinner from [employer], but managed her portions. She drinks hot tea with 2 packets of sugar. She has decreased her raspberry lemonade to 4 oz portions. She tracks her food in an almaz on her phone and restricts herself to 1,200 calories daily. She drinks 67 oz of water daily, but she still feels thirsty. She averages 7-8 hours of sleep nightly. She gained 1 pound since the last visit. She notes she is on her menses today. Percent body fat has changed from 43.6% to 42.7%, waist circumference measurement has changed from 42\" to 41\". Overall, patient is pleased and requests a refill of the medication today. Pt with chronic constipation. It has improved with fiber bars. Patient denies fatigue, sleep disturbance, chest pain, heart palpitations, nausea, signs of depression. Written by aakash Webb, as dictated by Dr. Carlos A Bolton DO.       ROS    Review of Systems negative except as noted above in HPI.     ALLERGIES:    Allergies   Allergen Reactions    Latex Itching    Other Medication Rash     SPRINTEC    Metformin Diarrhea     severe    Mirena [Levonorgestrel] Other (comments)     Severe abdominal pain  GALACTORRHEA       CURRENT MEDICATIONS:    Outpatient Medications Marked as Taking for the 11/6/18 encounter (Office Visit) with John Alves DO   Medication Sig Dispense Refill    liraglutide (SAXENDA) 3 mg/0.5 mL (18 mg/3 mL) pen 0.5 mL by SubCUTAneous route daily. 0.6 mg daily x 1 wk then 1.2 mg daily x 1 wk then 1.8 mg daily x 1 wk then 2.4 mg daily x 1 wk then 3.0 mg daily  Indications: WEIGHT LOSS MANAGEMENT FOR OBESE PATIENT (BMI >= 30) 5 Pen 3    levothyroxine (SYNTHROID) 125 mcg tablet Take 1 Tab by mouth Daily (before breakfast). Indications: hypothyroidism 30 Tab 5       PAST MEDICAL HISTORY:    Past Medical History:   Diagnosis Date    Allergy, unspecified not elsewhere classified     Ankle sprain     bilaterally. 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 Dr. Henry Subramanian, OB. Pieter Schultz, Genetic Counselor   Wray Schlatter Dr. Reagan Aver.  Elevated liver enzymes     Heart palpitations 2016    Exertional.  Dr. Amirah Subramanian. Negative Exercise Stress Test.    Hyperinsulinemia 2015    Hypothyroidism due to acquired atrophy of thyroid     Morbid obesity due to excess calories ([address] 2017    Obesity, Class II, BMI 35-39.9, with comorbidity 2017    Pre-diabetes 2017    Previous on Metformin for treatment.  Pure hypercholesterolemia 2013       PAST SURGICAL HISTORY:    Past Surgical History:   Procedure Laterality Date    HX  SECTION      HX DILATION AND CURETTAGE  2016    due to Cystic Hygroma/Terrell's Syndrome fetus. Dr. Anh Krause   (placed),  (removed)    IUD. Dr. Pendleton Bachelor.  HX TONSIL AND ADENOIDECTOMY  childhood    HX WISDOM TEETH EXTRACTION  2016    All 4 removed.        FAMILY HISTORY:    Family History   Problem Relation Age of Onset   [place] Arthritis-osteo Mother     Hypertension Mother    [place] Migraines Mother     Other Mother         IBS    Hypertension Maternal Uncle     Asthma Maternal Uncle     Hypertension Maternal Grandmother     Other Maternal Grandmother         diverticulosis    Arthritis-osteo Maternal Grandmother     Cancer Maternal Grandfather         prostate    Arthritis-osteo Maternal Grandfather     Other Maternal Aunt         uterine fibroids    Obesity Other        SOCIAL HISTORY:    Social History     Socioeconomic History    Marital status: SINGLE     Spouse name: Not on file    Number of children: 1    Years of education: Not on file    Highest education level: Not on file   Social Needs    Financial resource strain: Not on file    Food insecurity - worry: Not on file    Food insecurity - inability: Not on file   [place] needs - medical: Not on file   [place] needs - non-medical: Not on file   Occupational History    Not on file   Tobacco Use    Smoking status: Never Smoker    Smokeless tobacco: Never Used   Substance and Sexual Activity    Alcohol use: Yes     Alcohol/week: 0.6 oz     Types: 1 Glasses of wine per week     Comment: [place] - 1 every 2 weeks    Drug use: No    Sexual activity: Yes     Partners: Male     Birth control/protection: Condom   Other Topics Concern    Not on file   Social History Narrative    Not on file       IMMUNIZATIONS:    Immunization History   Administered Date(s) Administered    Influenza Vaccine 10/31/2013, 10/03/2016       PHYSICAL EXAMINATION    Vital Signs    Visit Vitals  BP 98/64 (BP 1 Location: Left arm, BP Patient Position: Sitting)   Pulse 94   Temp 97.7 °F (36.5 °C) (Temporal)   Resp 18   Ht 5' 3\" (1.6 m)   Wt 211 lb 4.8 oz (95.8 kg)   LMP 11/03/2018 (Exact Date)   SpO2 99%   BMI 37.43 kg/m²       Weight Metrics 11/6/2018 10/2/2018 10/2/2018 8/20/2018 6/19/2018 6/19/2018 8/15/2017   Weight 211 lb 4.8 oz - 210 lb 14.4 oz 218 lb 14.4 oz - 219 lb 12.8 oz -   Neck Circ (inches) - - - - - - -   Waist Measure Inches - 42 - - 43.5 - 38.5   Body Fat % - 43.6 - - 38.8 - 39.2   BMI 37.43 kg/m2 - 37.36 kg/m2 38.07 kg/m2 - 38.94 kg/m2 -         General appearance - Well nourished. Well appearing. Well developed.   No acute distress. Obese. Head - Normocephalic. Atraumatic. Eyes - pupils equal and reactive, extraocular eye movements intact, sclera anicteric  Ears - Hearing is grossly normal bilaterally. Nose - normal and patent, no erythema, discharge or polyps   Mouth - mucous membranes moist, pharynx normal with cobblestone appearance. No erythema, white exudate or obstruction. Neck - supple. Midline trachea. No carotid bruits are noted. No thyromegaly noted. Chest - clear to auscultation bilaterally anterriorly and posteriorly. No wheezes, rales or rhonchi. Breath sounds are symmetrical and unlabored bilaterally. Heart - normal rate. Regular rhythm, normal S1, S2. No murmur. No rubs, clicks or gallops noted. Abdomen - soft and distended. No masses or organomegaly. No rebound, rigidity or guarding. Bowel sounds normal x 4 quadrants. No tenderness noted. Neurological - awake, alert and oriented to person, place, and time and event. Cranial nerves II through XII intact. Muscle strength is +5/5 x 4 extremities. Sensation is intact to light touch bilaterally. Steady gait. Heme/Lymph - peripheral pulses normal x 4 extremities. No peripheral edema is noted. No cervical adenopathy noted. Skin - no rashes, erythema, ecchymosis, lacerations, abrasions, suspicious moles noted. No skin tags. No acanthosis nigricans noted in the axilla or neck. Psychological -   normal behavior, speech, dress and thought processes. Good insight. Good eye contact. Normal affect. Appropriate mood.       DATA REVIEWED  Lab Results   Component Value Date/Time    WBC 9.4 02/01/2017 10:00 AM    Hemoglobin (POC) 13.6 02/21/2011 03:39 PM    HGB 13.4 02/01/2017 10:00 AM    Hematocrit (POC) 40 02/21/2011 03:39 PM    HCT 41 02/01/2017 10:00 AM    PLATELET 346 (H) 87/51/3128 10:00 AM    MCV 79 (L) 02/01/2017 10:00 AM     Lab Results   Component Value Date/Time    Sodium 139 07/06/2018 08:04 AM    Potassium 4.6 07/06/2018 08:04 AM Chloride 102 07/06/2018 08:04 AM    CO2 24 07/06/2018 08:04 AM    Anion gap 13 02/01/2017 10:00 AM    Glucose 97 07/06/2018 08:04 AM    BUN 4 (L) 07/06/2018 08:04 AM    Creatinine 0.68 07/06/2018 08:04 AM    BUN/Creatinine ratio 6 (L) 07/06/2018 08:04 AM    GFR est  07/06/2018 08:04 AM    GFR est non- 07/06/2018 08:04 AM    Calcium 9.6 07/06/2018 08:04 AM    Bilirubin, total 0.3 07/06/2018 08:04 AM    AST (SGOT) 14 07/06/2018 08:04 AM    Alk. phosphatase 61 07/06/2018 08:04 AM    Protein, total 7.1 07/06/2018 08:04 AM    Albumin 4.3 07/06/2018 08:04 AM    Globulin 3.9 05/31/2015 07:58 PM    A-G Ratio 1.5 07/06/2018 08:04 AM    ALT (SGPT) 16 07/06/2018 08:04 AM     Lab Results   Component Value Date/Time    Cholesterol, total 199 07/06/2018 08:04 AM    HDL Cholesterol 41 07/06/2018 08:04 AM    LDL, calculated 145 (H) 07/06/2018 08:04 AM    VLDL, calculated 13 07/06/2018 08:04 AM    Triglyceride 67 07/06/2018 08:04 AM     Lab Results   Component Value Date/Time    Vitamin D 25-Hydroxy 12 (L) 02/01/2017 10:00 AM    VITAMIN D, 25-HYDROXY 14.3 (L) 07/06/2018 08:04 AM       Lab Results   Component Value Date/Time    Hemoglobin A1c 5.6 07/06/2018 08:04 AM     Lab Results   Component Value Date/Time    TSH 0.766 07/06/2018 08:04 AM    TSH 9.22 (H) 02/01/2017 10:00 AM     Lab Results   Component Value Date/Time    Microalb/Creat ratio (ug/mg creat.) 3.6 05/27/2016 10:16 AM         ASSESSMENT and PLAN    ICD-10-CM ICD-9-CM    1. Hyperinsulinemia E16.1 251.1    2. Hypothyroidism due to acquired atrophy of thyroid E03.4 244.8      246.8    3. Chronic constipation K59.09 564.00     improving since adding fiber bars daily   4. Vitamin D deficiency E55.9 268.9    5. Hyperglycemia R73.9 790.29    6. Pure hypercholesterolemia E78.00 272.0    7. Obesity (BMI 35.0-39.9 without comorbidity) E66.9 278.00 liraglutide (SAXENDA) 3 mg/0.5 mL (18 mg/3 mL) pen   8.  Weight loss R63.4 783.21        Continue current medications and care. Prescriptions written and sent to pharmacy. Saxenda 3 mg. Discussed the patient's BMI with her. The BMI follow up plan is as follows: I have counseled this patient on diet and exercise regimens. Diet recommendations:  Decrease carbohydrates (white foods including flour, white bread, white rice, white pasta, white potatoes; corn, sweet foods, sweet drinks and alcohol), increase green leafy vegetables and protein with each meal.  Avoid fried foods. Eat 3-5 small meals daily. Do not skip meals. Ok to use meal replacements up to twice daily with protein goal of 60 mg per meal.   Recommend OTC Premiere Protein bars or shakes. Increase water intake. Increase physical activity to 30 minutes daily for health benefit or 60 minutes daily to prevent weight regain, as tolerated. Physical Activity prescription:  A goal of 30 minutes physical activity daily is recommended for health benefit and at least 60 minutes daily to prevent weight regain. For weight loss, no less than 75% needs to be aerobic (i.e. Walking) and no more than 25% resistance exercising (i.e. Weight lifting). For weight maintenance phase, 50% aerobic and 50% resistance exercises. Sleep prescription:    A goal of 7-8 hours of uninterrupted sleep is recommended to turn off the Grehlin hormone to be released from the stomach and triggers appetite while promoting weight gain. Proper rest turns on Leptin hormone to be released from white adipose tissue and promotes weight loss. Counseled patient on: weight loss goals, emphasizing a 5-10% weight loss in 6-12 months and strategies, vit d deficiency, cholesterol, and sleep hygiene. Immunizations noted. Praised pt for weight loss efforts and progress. Patient was offered a choice/choices in the treatment plan today. Patient expresses understanding of the plan and agrees with recommendations.     Follow-up Disposition:  Return in about 1 month (around 12/6/2018) for weight, blood pressure. Written by aakash Segovia, as dictated by Dr. Braulio Sauer DO. Documentation True and Accepted by Atilio Vang. Patient Instructions        Learning About Low-Carbohydrate Diets for Weight Loss  What is a low-carbohydrate diet? Low-carb diets avoid foods that are high in carbohydrate. These high-carb foods include pasta, bread, rice, cereal, fruits, and starchy vegetables. Instead, these diets usually have you eat foods that are high in fat and protein. Many people lose weight quickly on a low-carb diet. But the early weight loss is water. People on this diet often gain the weight back after they start eating carbs again. Not all diet plans are safe or work well. A lot of the evidence shows that low-carb diets aren't healthy. That's because these diets often don't include healthy foods like fruits and vegetables. Losing weight safely means balancing protein, fat, and carbs with every meal and snack. And low-carb diets don't always provide the vitamins, minerals, and fiber you need. If you have a serious medical condition, talk to your doctor before you try any diet. These conditions include kidney disease, heart disease, type 2 diabetes, high cholesterol, and high blood pressure. If you are pregnant, it may not be safe for your baby if you are on a low-carb diet. How can you lose weight safely? You might have heard that a diet plan helped another person lose weight. But that doesn't mean that it will work for you. It is very hard to stay on a diet that includes lots of big changes in your eating habits. If you want to get to a healthy weight and stay there, making healthy lifestyle changes will often work better than dieting. These steps can help. · Make a plan for change. Work with your doctor to create a plan that is right for you. · See a dietitian. He or she can show you how to make healthy changes in your eating habits. · Manage stress.  If you have a lot of stress in your life, it can be hard to focus on making healthy changes to your daily habits. · Track your food and activity. You are likely to do better at losing weight if you keep track of what you eat and what you do. Follow-up care is a key part of your treatment and safety. Be sure to make and go to all appointments, and call your doctor if you are having problems. It's also a good idea to know your test results and keep a list of the medicines you take. Where can you learn more? Go to http://anna-alistair.info/. Enter A121 in the search box to learn more about \"Learning About Low-Carbohydrate Diets for Weight Loss. \"  Current as of: March 29, 2018  Content Version: 11.8  © 8111-4662 Healthwise, [employer]. Care instructions adapted under license by [employer] (which disclaims liability or warranty for this information). If you have questions about a medical condition or this instruction, always ask your healthcare professional. [place] [address] any warranty or liability for your use of this information. WEIGHT LOSS PLAN    1. Read food labels and count calories. Goal 6751-7489 calories daily for women and 1798-4561 calories daily for men. Achieve this by decreasing caloric intake by 500 calories by weekly increments. NOTE:  1 pound = 3500 calories! Www.loseit. com  Www.myfitnesspal.com  Www.CardioKinetixople. INXPO  Www.healthfinder. gov  Weight watchers mobile    Calories needed to lose 1-2 pounds a week = 10 x your weight in pounds    2. Increase water intake. Per [facility], it is important to drink 1/2 your body weight in ounces of water daily. Decrease your water consumption 2-3 hours before bedtime to prevent sleep disturbance from frequent urination. 3.  Decrease sugary beverages. Each can or glass of soda increases your risk of obesity by 60%. Can lose 10 pounds in a month by avoiding any soda.      12 oz can of soda = 140 calories, 16 oz cup of sweet tea = 200 calories    16 oz orange juice = 200 calories, 10 oz apple juice = 150 calories   32 oz sports drink = 200 calories, 16 oz punch = 240 calories   3.5 oz alcohol = 100-150 calories     4. Avoid High Fructose Corn Syrup products. This ingredient makes products highly addictive! 5. Exercise 30 minutes daily 5 days weekly, minimally. If you burn 3500 calories that equals a pound! Use a pedometer to count steps. Visit www. [employer] for a free pedometer and diet recommendations. Your maximum heart rate = 220 - your age    Never exercise at your maximum heart rate. See handout for target heart rate. 6.  Decrease carbohydrates (white bread, pasta, rice, potatoes, sweet foods and sweet drinks like soda, tea, coffee, juice and sports drinks). Increase fiber and protein. Goal:   calories daily of carbohydrates     Try CHROMIUM PICONATE 200 MG THREE TIMES DAILY,    to decrease Premenstrual carbohydrate cravings. 7.  Eat 3-5 small meals daily, include lots of protein (beans/legumes, nuts, lean meat, eggs) and green vegetables with each. (Breakfast, lunch, dinner with 2 healthy snacks)    8. Get proper rest 7-8 hours uninterrupted. When you get less than 6 hours, it triggers hunger by affecting your Grehlin:Leptin ratio and this results in weight gain. 9.  Watch your food portions. Green leafy vegetables should cover 1/2 of the plate, lean meat 1/4 of the plate, starchy vegetable 1/4 of the plate. Use smaller plates. 10.  Do not eat until you are full. Eat until you are no longer hungry. If you are not sure, try drinking a glass of water before getting your second serving of food. 11.  Do not weigh yourself daily. Wait until your next office visit. Use how you feel and  how your clothes fit as measurements of success. 12.  Address your spirituality to draw strength from above during your journey.   Remember \"I am fearfully and wonderfully made. Marvelous in His eyes. \"    13. Set realistic, appropriate and achievable weight loss goals:     RECOMMENDED TARGET WEIGHT LOSS:  Initial weight loss of 5-10% of your initial body weight achieved over 6 months or a decrease of 2 BMI units. MINIMUM GOAL OF WEIGHT LOSS:  Reduce body weight and maintain a lower body weight. Prevent weight gain. RELATED WEBSITES:      Www.obesityaction. org  (and consider joining the Obesity Action Coalition for $25/year. The [address] mission is to elevated and empower those affected by obesity through education, advocacy and support. Quarterly journals included in membership fee. )    Www.Fuhu  www.[employer].com     RELATED DIETS:  Dr. James Parsons Weight loss challenge, Mediterranean diet, [address] [place] [address] Watchers, Paleo Diet (anti-inflammatory diet)        EXERCISE 150 MINUTES WEEKLY. THIS CAN BE ACHIEVED BY WORKING OUT OR WALKING A MINIMUM OF 30 MINUTES FOR 5 DAYS WEEKLY. YOU CAN EXERCISE IN INCREMENTS OF 10-15 MINUTES UP TO 3 TIMES A DAY. Consider performing \"brainless exercise. \"  Choose your favorite tv program.  Five minutes before and for 5 minutes after the tv program, stretch your body. While the program is on, walk in place watching the show. When commercials come on, rest or walk around the house to do other things. When the program begins, return to walking in place. When you are able keep walking during the commercials and add light weights to your ankles or hands. By the end of the show, you would have walked 30 minutes. If you need shorter spurts of exercise, walk during the commercials and rest during the show. Drink to glasses of water prior to any exercise to prevent dehydration and to improve the results of the work out. Your RESTING HEART RATE is the number of times your heart beats per minute when you are not exerting yourself.   The more fit you are, the lower your resting heart rate will be. Your MAXIMUM HEART RATE is the number of times per minute your heart pumps when it is working at 100% capacity. NEVER EXERCISE AT YOUR MAXIMUM HEART RATE. MAX HEART RATE = 220 - your age    For example, in a 48year old, the maximum heart rate is 220-50 = 170 beats per minute    Your [place] is the number of beats per minute our heart should pump during aerobic exercise. Reaching your target heart rate indicates that your body is receiving maximum cardiovascular and fat burning benefits. If you are fit, your TARGET HEART RATE = 70-85% of your maximum Heart rate      For a 48year old with vigorous intensity physical activity,         Target heart rate= 170 bpm x .70 = 119 bpm     Target heart rate = 170 bpm x .85=  145 bpm        Therefore, your target heart rate during physical activity is 119-145      If you are not fit, TARGET HEART RATE = 50-70% of your maximum Heart rate    MODERATE CONSISTENT AEROBIC EXERCISE OR WALKING FOR AT LEAST 150 MINUTES WEEKLY IS AN ESSENTIAL PART OF ANY WEIGHT LOSS OF WEIGHT MAINTENANCE PROGRAM.     During WEIGHT LOSS PHASE, at least 75% of your exercise needs to be walking or moderate aerobic activity and 25% or 0% can be Isometric or Resistance type exercises (the latter can be deferred to the weight maintenance phase.)     During WEIGHT MAINTENANCE PHASE, at least 50% of your exercise needs to be aerobic and 50% Isometric or Resistance type exercises. BENEFITS OF MODERATE INTENSITY EXERCISE MOST DAYS OF THE WEEK:     1. Insulin Resistance improves 30-85%   2. Abdominal Fat decreases by 30%   3. Inflammatory Markers decrease by 30% (therefore pain decreases)   4. Systolic and Diastolic Blood Pressure decrease by 4 mmHg   5. HDL improves by 5%   6. Triglycerides decrease by 15%   7. Shift from small dense LDL to large dense LDL (therefore decrease Insulin Resistance)   8.   Decrease coagulability

## 2018-11-06 NOTE — PROGRESS NOTES
Laura Emmanuel  Identified pt with two pt identifiers(name and ). Chief Complaint   Patient presents with    Medication Refill     saxenda         1. Have you been to the ER, urgent care clinic since your last visit? Hospitalized since your last visit? NO    2. Have you seen or consulted any other health care providers outside of the [address] [place] [address] since your last visit? Include any pap smears or colon screening. NO      Advance Care Planning    In the event something were to happen to you and you were unable to speak on your behalf, do you have an Advance Directive/ Living Will in place stating your wishes? NO    If yes, do we have a copy on file NO    If no, would you like information NO    My Chart     My chart gives you direct online access to portions of the electronic medical record (EMR) where your doctor stores your health information (ie, lab results, appointment information, medications, immunizations, and more. It is free. Would you like to set up your my chart? YES    [unfilled]    Weight Metrics 2018 10/2/2018 10/2/2018 2018 2018 2018 8/15/2017   Weight 211 lb 4.8 oz - 210 lb 14.4 oz 218 lb 14.4 oz - 219 lb 12.8 oz -   Neck Circ (inches) - - - - - - -   Waist Measure Inches - 42 - - 43.5 - 38.5   Body Fat % - 43.6 - - 38.8 - 39.2   BMI 37.43 kg/m2 - 37.36 kg/m2 38.07 kg/m2 - 38.94 kg/m2 -       Medication reconciliation up to date and corrected with patient at this time. Today's provider has been notified of reason for visit, vitals and flowsheets obtained on patients. Reviewed record in preparation for visit, huddled with provider and have obtained necessary documentation.       Health Maintenance Due   Topic    PAP AKA CERVICAL CYTOLOGY        Wt Readings from Last 3 Encounters:   18 211 lb 4.8 oz (95.8 kg)   10/02/18 210 lb 14.4 oz (95.7 kg)   18 218 lb 14.4 oz (99.3 kg)     Temp Readings from Last 3 Encounters:   18 97.7 °F (36.5 °C) (Temporal)   10/02/18 97.9 °F (36.6 °C) (Temporal)   08/20/18 98.3 °F (36.8 °C) (Oral)     BP Readings from Last 3 Encounters:   11/06/18 98/64   10/02/18 120/79   08/20/18 113/72     Pulse Readings from Last 3 Encounters:   11/06/18 94   10/02/18 (!) 102   08/20/18 79     Vitals:    11/06/18 0914   BP: 98/64   Pulse: 94   Resp: 18   Temp: 97.7 °F (36.5 °C)   TempSrc: Temporal   SpO2: 99%   Weight: 211 lb 4.8 oz (95.8 kg)   Height: 5' 3\" (1.6 m)   PainSc:   0 - No pain   LMP: 11/03/2018         Learning Assessment:  :     Learning Assessment 6/19/2018 3/23/2015   PRIMARY LEARNER Patient Patient   HIGHEST LEVEL OF EDUCATION - PRIMARY LEARNER  - 2 YEARS OF COLLEGE   BARRIERS PRIMARY LEARNER NONE NONE   CO-LEARNER CAREGIVER No -   PRIMARY LANGUAGE ENGLISH ENGLISH   LEARNER PREFERENCE PRIMARY DEMONSTRATION DEMONSTRATION   ANSWERED BY patient patient   RELATIONSHIP SELF SELF       Depression Screening:  :     PHQ over the last two weeks 6/19/2018   Little interest or pleasure in doing things Not at all   Feeling down, depressed, irritable, or hopeless Not at all   Total Score PHQ 2 0       Fall Risk Assessment:  :     Fall Risk Assessment, last 12 mths 6/19/2018   Able to walk? Yes   Fall in past 12 months? No       Abuse Screening:  :     Abuse Screening Questionnaire 6/19/2018 6/28/2016   Do you ever feel afraid of your partner? N N   Are you in a relationship with someone who physically or mentally threatens you? N N   Is it safe for you to go home?  Y Y       ADL Screening:  :     ADL Assessment 6/19/2018   Feeding yourself No Help Needed   Getting from bed to chair No Help Needed   Getting dressed No Help Needed   Bathing or showering No Help Needed   Walk across the room (includes cane/walker) No Help Needed   Using the telphone No Help Needed   Taking your medications No Help Needed   Preparing meals No Help Needed   Managing money (expenses/bills) No Help Needed   Moderately strenuous housework (laundry) No Help Needed   Shopping for personal items (toiletries/medicines) No Help Needed   Shopping for groceries No Help Needed   Driving No Help Needed   Climbing a flight of stairs No Help Needed   Getting to places beyond walking distances No Help Needed

## 2018-11-17 LAB
25(OH)D3+25(OH)D2 SERPL-MCNC: 14.1 NG/ML (ref 30–100)
CHOLEST SERPL-MCNC: 193 MG/DL (ref 100–199)
HDLC SERPL-MCNC: 42 MG/DL
INTERPRETATION, 910389: NORMAL
LDLC SERPL CALC-MCNC: 134 MG/DL (ref 0–99)
T4 FREE SERPL-MCNC: 1.85 NG/DL (ref 0.82–1.77)
TRIGL SERPL-MCNC: 83 MG/DL (ref 0–149)
TSH SERPL DL<=0.005 MIU/L-ACNC: 0.15 UIU/ML (ref 0.45–4.5)
VLDLC SERPL CALC-MCNC: 17 MG/DL (ref 5–40)

## 2019-01-03 DIAGNOSIS — E03.4 HYPOTHYROIDISM DUE TO ACQUIRED ATROPHY OF THYROID: ICD-10-CM

## 2019-01-03 NOTE — TELEPHONE ENCOUNTER
[facility] Fax  Levothyroxine 137 MCG Tablet  Qty 90  Take 1 tablet by mouth daily before breakfast     Fax says 137 mcg  Med list say 125 mcg

## 2019-01-04 RX ORDER — LEVOTHYROXINE SODIUM 125 UG/1
125 TABLET ORAL
Qty: 30 TAB | Refills: 1 | Status: SHIPPED | OUTPATIENT
Start: 2019-01-04 | End: 2019-02-08 | Stop reason: SDUPTHER

## 2019-01-04 NOTE — TELEPHONE ENCOUNTER
PCP: Nicky Kamara DO    Last appt: 11/16/2018  Future Appointments   Date Time Provider Shawanda Radha   1/25/2019  8:45 AM Nicky Kamara DO [facility]       Requested Prescriptions     Pending Prescriptions Disp Refills    levothyroxine (SYNTHROID) 125 mcg tablet 30 Tab 5     Sig: Take 1 Tab by mouth Daily (before breakfast).        Prior labs and Blood pressures:  BP Readings from Last 3 Encounters:   11/06/18 98/64   10/02/18 120/79   08/20/18 113/72     Lab Results   Component Value Date/Time    Sodium 139 07/06/2018 08:04 AM    Potassium 4.6 07/06/2018 08:04 AM    Chloride 102 07/06/2018 08:04 AM    CO2 24 07/06/2018 08:04 AM    Anion gap 13 02/01/2017 10:00 AM    Glucose 97 07/06/2018 08:04 AM    BUN 4 (L) 07/06/2018 08:04 AM    Creatinine 0.68 07/06/2018 08:04 AM    BUN/Creatinine ratio 6 (L) 07/06/2018 08:04 AM    GFR est  07/06/2018 08:04 AM    GFR est non- 07/06/2018 08:04 AM    Calcium 9.6 07/06/2018 08:04 AM     Lab Results   Component Value Date/Time    Hemoglobin A1c 5.6 07/06/2018 08:04 AM     Lab Results   Component Value Date/Time    Cholesterol, total 193 11/16/2018 08:07 AM    HDL Cholesterol 42 11/16/2018 08:07 AM    LDL, calculated 134 (H) 11/16/2018 08:07 AM    VLDL, calculated 17 11/16/2018 08:07 AM    Triglyceride 83 11/16/2018 08:07 AM     Lab Results   Component Value Date/Time    Vitamin D 25-Hydroxy 12 (L) 02/01/2017 10:00 AM    VITAMIN D, 25-HYDROXY 14.1 (L) 11/16/2018 08:07 AM       Lab Results   Component Value Date/Time    TSH 0.151 (L) 11/16/2018 08:07 AM    TSH 9.22 (H) 02/01/2017 10:00 AM

## 2019-02-08 DIAGNOSIS — E03.4 HYPOTHYROIDISM DUE TO ACQUIRED ATROPHY OF THYROID: ICD-10-CM

## 2019-02-08 NOTE — TELEPHONE ENCOUNTER
PCP: Jane Rice DO    Last appt: 1/25/2019  No future appointments. Requested Prescriptions     Pending Prescriptions Disp Refills    levothyroxine (SYNTHROID) 125 mcg tablet 30 Tab 1     Sig: Take 1 Tab by mouth Daily (before breakfast).        Prior labs and Blood pressures:  BP Readings from Last 3 Encounters:   11/06/18 98/64   10/02/18 120/79   08/20/18 113/72     Lab Results   Component Value Date/Time    Sodium 139 07/06/2018 08:04 AM    Potassium 4.6 07/06/2018 08:04 AM    Chloride 102 07/06/2018 08:04 AM    CO2 24 07/06/2018 08:04 AM    Anion gap 13 02/01/2017 10:00 AM    Glucose 97 07/06/2018 08:04 AM    BUN 4 (L) 07/06/2018 08:04 AM    Creatinine 0.68 07/06/2018 08:04 AM    BUN/Creatinine ratio 6 (L) 07/06/2018 08:04 AM    GFR est  07/06/2018 08:04 AM    GFR est non- 07/06/2018 08:04 AM    Calcium 9.6 07/06/2018 08:04 AM     Lab Results   Component Value Date/Time    Hemoglobin A1c 5.6 07/06/2018 08:04 AM     Lab Results   Component Value Date/Time    Cholesterol, total 193 11/16/2018 08:07 AM    HDL Cholesterol 42 11/16/2018 08:07 AM    LDL, calculated 134 (H) 11/16/2018 08:07 AM    VLDL, calculated 17 11/16/2018 08:07 AM    Triglyceride 83 11/16/2018 08:07 AM     Lab Results   Component Value Date/Time    Vitamin D 25-Hydroxy 12 (L) 02/01/2017 10:00 AM    VITAMIN D, 25-HYDROXY 14.1 (L) 11/16/2018 08:07 AM       Lab Results   Component Value Date/Time    TSH 0.151 (L) 11/16/2018 08:07 AM    TSH 9.22 (H) 02/01/2017 10:00 AM

## 2019-02-08 NOTE — TELEPHONE ENCOUNTER
Pt is requesting a Rx for Levothyroxine called to [facility].Pts number is [phone]. She has been out of the medication since January. Requested Prescriptions     Pending Prescriptions Disp Refills    levothyroxine (SYNTHROID) 125 mcg tablet 30 Tab 1     Sig: Take 1 Tab by mouth Daily (before breakfast).

## 2019-02-11 RX ORDER — LEVOTHYROXINE SODIUM 125 UG/1
125 TABLET ORAL
Qty: 30 TAB | Refills: 1 | Status: SHIPPED | OUTPATIENT
Start: 2019-02-11 | End: 2019-05-10 | Stop reason: SDUPTHER

## 2019-02-12 NOTE — TELEPHONE ENCOUNTER
Writer left message on the voice mail informing the pt to call the clinic when available.    Message: pt will need to make an appt for labs to check thyroid levels

## 2019-05-09 ENCOUNTER — PATIENT MESSAGE (OUTPATIENT)
Dept: FAMILY MEDICINE CLINIC | Age: 34
End: 2019-05-09

## 2019-05-09 DIAGNOSIS — E03.4 HYPOTHYROIDISM DUE TO ACQUIRED ATROPHY OF THYROID: ICD-10-CM

## 2019-05-10 NOTE — TELEPHONE ENCOUNTER
PCP: Mark Olsen DO    Last appt: 1/25/2019  No future appointments. Requested Prescriptions     Pending Prescriptions Disp Refills    levothyroxine (SYNTHROID) 125 mcg tablet 90 Tab 2     Sig: Take 1 Tab by mouth Daily (before breakfast).  Indications: hypothyroidism       Prior labs and Blood pressures:  BP Readings from Last 3 Encounters:   11/06/18 98/64   10/02/18 120/79   08/20/18 113/72     Lab Results   Component Value Date/Time    Sodium 139 07/06/2018 08:04 AM    Potassium 4.6 07/06/2018 08:04 AM    Chloride 102 07/06/2018 08:04 AM    CO2 24 07/06/2018 08:04 AM    Anion gap 13 02/01/2017 10:00 AM    Glucose 97 07/06/2018 08:04 AM    BUN 4 (L) 07/06/2018 08:04 AM    Creatinine 0.68 07/06/2018 08:04 AM    BUN/Creatinine ratio 6 (L) 07/06/2018 08:04 AM    GFR est  07/06/2018 08:04 AM    GFR est non- 07/06/2018 08:04 AM    Calcium 9.6 07/06/2018 08:04 AM     Lab Results   Component Value Date/Time    Hemoglobin A1c 5.6 07/06/2018 08:04 AM     Lab Results   Component Value Date/Time    Cholesterol, total 193 11/16/2018 08:07 AM    HDL Cholesterol 42 11/16/2018 08:07 AM    LDL, calculated 134 (H) 11/16/2018 08:07 AM    VLDL, calculated 17 11/16/2018 08:07 AM    Triglyceride 83 11/16/2018 08:07 AM     Lab Results   Component Value Date/Time    Vitamin D 25-Hydroxy 12 (L) 02/01/2017 10:00 AM    VITAMIN D, 25-HYDROXY 14.1 (L) 11/16/2018 08:07 AM       Lab Results   Component Value Date/Time    TSH 0.151 (L) 11/16/2018 08:07 AM    TSH 9.22 (H) 02/01/2017 10:00 AM

## 2019-05-13 DIAGNOSIS — E03.4 HYPOTHYROIDISM DUE TO ACQUIRED ATROPHY OF THYROID: Primary | ICD-10-CM

## 2019-05-13 RX ORDER — LEVOTHYROXINE SODIUM 125 UG/1
125 TABLET ORAL
Qty: 90 TAB | Refills: 0 | Status: SHIPPED | OUTPATIENT
Start: 2019-05-13 | End: 2019-10-26 | Stop reason: SDUPTHER

## 2020-01-24 ENCOUNTER — OFFICE VISIT (OUTPATIENT)
Dept: FAMILY MEDICINE CLINIC | Age: 35
End: 2020-01-24

## 2020-01-24 VITALS
DIASTOLIC BLOOD PRESSURE: 81 MMHG | TEMPERATURE: 98.2 F | HEIGHT: 63 IN | WEIGHT: 240.5 LBS | BODY MASS INDEX: 42.61 KG/M2 | SYSTOLIC BLOOD PRESSURE: 121 MMHG | RESPIRATION RATE: 18 BRPM | HEART RATE: 90 BPM | OXYGEN SATURATION: 99 %

## 2020-01-24 DIAGNOSIS — R12 HEARTBURN: ICD-10-CM

## 2020-01-24 DIAGNOSIS — E78.00 PURE HYPERCHOLESTEROLEMIA: ICD-10-CM

## 2020-01-24 DIAGNOSIS — E66.01 OBESITY, MORBID (HCC): ICD-10-CM

## 2020-01-24 DIAGNOSIS — K59.09 OTHER CONSTIPATION: ICD-10-CM

## 2020-01-24 DIAGNOSIS — R10.84 GENERALIZED ABDOMINAL PAIN: Primary | ICD-10-CM

## 2020-01-24 DIAGNOSIS — E16.1 HYPERINSULINEMIA: ICD-10-CM

## 2020-01-24 DIAGNOSIS — E03.4 HYPOTHYROIDISM DUE TO ACQUIRED ATROPHY OF THYROID: ICD-10-CM

## 2020-01-24 DIAGNOSIS — R73.9 HYPERGLYCEMIA: ICD-10-CM

## 2020-01-24 DIAGNOSIS — E55.9 VITAMIN D DEFICIENCY: ICD-10-CM

## 2020-01-24 DIAGNOSIS — R53.83 OTHER FATIGUE: ICD-10-CM

## 2020-01-24 DIAGNOSIS — R68.83 CHILLS (WITHOUT FEVER): ICD-10-CM

## 2020-01-24 LAB
BILIRUB UR QL STRIP: NEGATIVE
FLUAV+FLUBV AG NOSE QL IA.RAPID: NEGATIVE POS/NEG
FLUAV+FLUBV AG NOSE QL IA.RAPID: NEGATIVE POS/NEG
GLUCOSE UR-MCNC: NEGATIVE MG/DL
HCG URINE, QL. (POC): NEGATIVE
KETONES P FAST UR STRIP-MCNC: NEGATIVE MG/DL
PH UR STRIP: 7 [PH] (ref 4.6–8)
PROT UR QL STRIP: NEGATIVE
SP GR UR STRIP: 1.02 (ref 1–1.03)
UA UROBILINOGEN AMB POC: NORMAL (ref 0.2–1)
URINALYSIS CLARITY POC: CLEAR
URINALYSIS COLOR POC: YELLOW
URINE BLOOD POC: NEGATIVE
URINE LEUKOCYTES POC: NEGATIVE
URINE NITRITES POC: NEGATIVE
VALID INTERNAL CONTROL?: YES
VALID INTERNAL CONTROL?: YES

## 2020-01-24 RX ORDER — DICYCLOMINE HYDROCHLORIDE 10 MG/1
10 CAPSULE ORAL 3 TIMES DAILY
Qty: 30 CAP | Refills: 1 | Status: SHIPPED | OUTPATIENT
Start: 2020-01-24 | End: 2022-02-04

## 2020-01-24 RX ORDER — PANTOPRAZOLE SODIUM 40 MG/1
40 TABLET, DELAYED RELEASE ORAL DAILY
Qty: 30 TAB | Refills: 2 | Status: SHIPPED | OUTPATIENT
Start: 2020-01-24 | End: 2022-02-04

## 2020-01-24 RX ORDER — LEVOTHYROXINE SODIUM 125 UG/1
TABLET ORAL
Qty: 90 TAB | Refills: 3 | Status: SHIPPED | OUTPATIENT
Start: 2020-01-24 | End: 2021-05-13 | Stop reason: SDUPTHER

## 2020-01-24 NOTE — PROGRESS NOTES
HISTORY OF PRESENT ILLNESS  Paulina Mota is a 29 y.o. female presents with Abdominal Pain; Fatigue; Chills; Constipation; Nausea; Thyroid Problem; and Medication Refill    Agree with nurse note. Pt's last appointment with me was 11/6/2018. Pt with hyperglycemia, vit d deficiency, hypothyroidism, hyperinsulinemia, heartburn, and hypercholesterolemia presents to the office with a BP of 121/81. She notes she has been out of Synthroid 125 mcg for 2 months. last rx was 10/29/2019 but pt reports she did not get this. She reports the pharmacist told her they could only give her 90 pills, not 30, and we had written a script for 30. Pt complains of intermittent abdominal pain x 2 months. She has nausea, stomach cramping, gas, constipation, dehydration, decreased appetite, chills, fatigue the last three weeks. She describes the pain as a stabbing pain. She has headaches. Urine pregnancy was negative today. Patient denies fever, ear pain, dizziness, nasal congestion, nasal discharge, post nasal drainage, sore throat, itchy or watery eyes, chest pain or tightness, SOB, wheezing, cough, bladder symptoms and body aches. Written by aakash Moore, as dictated by Dr. Germania Rios DO.    ROS    Review of Systems negative except as noted above in HPI. ALLERGIES:    Allergies   Allergen Reactions    Latex Itching    Other Medication Rash     SPRINTEC    Metformin Diarrhea     severe    Mirena [Levonorgestrel] Other (comments)     Severe abdominal pain  GALACTORRHEA       CURRENT MEDICATIONS:    Outpatient Medications Marked as Taking for the 1/24/20 encounter (Office Visit) with Latoya Savage DO   Medication Sig Dispense Refill    dicyclomine (BENTYL) 10 mg capsule Take 1 Cap by mouth three (3) times daily. Indications: abdominal spasms 30 Cap 1    pantoprazole (PROTONIX) 40 mg tablet Take 1 Tab by mouth daily.  30 Tab 2    levothyroxine (SYNTHROID) 125 mcg tablet TAKE 1 TABLET BY MOUTH EVERY DAY BEFORE BREAKFAST  Indications: a condition with low thyroid hormone levels 90 Tab 3       PAST MEDICAL HISTORY:    Past Medical History:   Diagnosis Date    Allergy, unspecified not elsewhere classified     Ankle sprain     bilaterally. 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 Dr. Abdlulahi Reece, OB. Cuong Castillo, Genetic Counselor   Batool Carpenter.  Elevated liver enzymes     Heart palpitations 2016    Exertional.  Dr. Flor Mcdaniel. Negative Exercise Stress Test.    Hyperinsulinemia 2015    Hypothyroidism due to acquired atrophy of thyroid     Morbid obesity due to excess calories ([facility] [address] 2017    Obesity, Class II, BMI 35-39.9, with comorbidity 2017    Pre-diabetes 2017    Previous on Metformin for treatment.  Pure hypercholesterolemia 2013       PAST SURGICAL HISTORY:    Past Surgical History:   Procedure Laterality Date    HX  SECTION      HX DILATION AND CURETTAGE  2016    due to Cystic Hygroma/Terrell's Syndrome fetus. Dr. Maia Wray   (placed),  (removed)    IUD. Dr. Hasmukh Queen.  HX TONSIL AND ADENOIDECTOMY  childhood    HX WISDOM TEETH EXTRACTION  2016    All 4 removed.        FAMILY HISTORY:    Family History   Problem Relation Age of Onset   Verlinda Smoker Arthritis-osteo Mother     Hypertension Mother    Verlinda Smoker Migraines Mother     Other Mother         IBS    Hypertension Maternal Uncle     Asthma Maternal Uncle     Hypertension Maternal Grandmother     Other Maternal Grandmother         diverticulosis    Arthritis-osteo Maternal Grandmother     Cancer Maternal Grandfather         prostate    Arthritis-osteo Maternal Grandfather     Other Maternal Aunt         uterine fibroids    Obesity Other        SOCIAL HISTORY:    Social History     Socioeconomic History    Marital status: SINGLE     Spouse name: Not on file    Number of children: 1    Years of education: Not on file    Highest education level: Not on file   Tobacco Use    Smoking status: Never Smoker    Smokeless tobacco: Never Used   Substance and Sexual Activity    Alcohol use: Yes     Alcohol/week: 1.0 standard drinks     Types: 1 Glasses of wine per week     Comment: Corrineo - 1 every 2 weeks    Drug use: No    Sexual activity: Yes     Partners: Male     Birth control/protection: Condom       IMMUNIZATIONS:    Immunization History   Administered Date(s) Administered    Influenza Vaccine 10/31/2013, 10/03/2016         PHYSICAL EXAMINATION    Vital Signs    Visit Vitals  /81   Pulse 90   Temp 98.2 °F (36.8 °C) (Oral)   Resp 18   Ht 5' 3\" (1.6 m)   Wt 240 lb 8 oz (109.1 kg)   LMP 11/15/2019 (Approximate)   SpO2 99%   BMI 42.60 kg/m²       Weight Metrics 1/24/2020 11/6/2018 11/6/2018 10/2/2018 10/2/2018 8/20/2018 6/19/2018   Weight 240 lb 8 oz - 211 lb 4.8 oz - 210 lb 14.4 oz 218 lb 14.4 oz -   Neck Circ (inches) - - - - - - -   Waist Measure Inches - 41 - 42 - - 43.5   Body Fat % - 42.7 - 43.6 - - 38.8   BMI 42.6 kg/m2 - 37.43 kg/m2 - 37.36 kg/m2 38.07 kg/m2 -       General appearance - Well nourished. Well appearing. Well developed. No acute distress. Obese. Head - Normocephalic. Atraumatic. Eyes - pupils equal and reactive. Extraocular eye movements intact. Sclera anicteric. Mildly injected sclera. Ears - Hearing is grossly normal bilaterally. Nose - normal and patent. No polyps noted. No erythema. No discharge. Mouth - mucous membranes with adequate moisture. Posterior pharynx normal with cobblestone appearance. No erythema, white exudate or obstruction. Neck - supple. Midline trachea. No carotid bruits noted bilaterally. No thyromegaly noted. Chest - clear to auscultation bilaterally anteriorly and posteriorly. No wheezes. No rales or rhonchi. Breath sounds are symmetrical bilaterally. Unlabored respirations. Heart - normal rate. Regular rhythm. Normal S1, S2. No murmur noted. No rubs, clicks or gallops noted. Abdomen - soft and distended. No masses or organomegaly. No rebound, rigidity or guarding. Bowel sounds normal x 4 quadrants. Pain on palpation at RUQ. Neurological - awake, alert and oriented to person, place, and time and event. Cranial nerves II through XII intact. Clear speech. Muscle strength is +5/5 x 4 extremities. Sensation is intact to light touch bilaterally. Steady gait. Heme/Lymph - peripheral pulses normal x 4 extremities. No peripheral edema is noted. Musculoskeletal - Intact x 4 extremities. Full ROM x 4 extremities. No pain with movement. Back exam - normal range of motion. No pain on palpation of the spinous processes in the cervical, thoracic, lumbar, sacral regions. No CVA tenderness. Skin - no rashes, erythema, ecchymosis, lacerations, abrasions, suspicious moles noted  Psychological -   normal behavior, dress and thought processes. Good insight. Good eye contact. Normal affect. Appropriate mood. Normal speech. DATA REVIEWED    Lab Results   Component Value Date/Time    WBC 9.4 02/01/2017 10:00 AM    Hemoglobin (POC) 13.6 02/21/2011 03:39 PM    HGB 13.4 02/01/2017 10:00 AM    Hematocrit (POC) 40 02/21/2011 03:39 PM    HCT 41 02/01/2017 10:00 AM    PLATELET 021 (H) 31/84/1540 10:00 AM    MCV 79 (L) 02/01/2017 10:00 AM     Lab Results   Component Value Date/Time    Sodium 139 07/06/2018 08:04 AM    Potassium 4.6 07/06/2018 08:04 AM    Chloride 102 07/06/2018 08:04 AM    CO2 24 07/06/2018 08:04 AM    Anion gap 13 02/01/2017 10:00 AM    Glucose 97 07/06/2018 08:04 AM    BUN 4 (L) 07/06/2018 08:04 AM    Creatinine 0.68 07/06/2018 08:04 AM    BUN/Creatinine ratio 6 (L) 07/06/2018 08:04 AM    GFR est  07/06/2018 08:04 AM    GFR est non- 07/06/2018 08:04 AM    Calcium 9.6 07/06/2018 08:04 AM    Bilirubin, total 0.3 07/06/2018 08:04 AM    AST (SGOT) 14 07/06/2018 08:04 AM    Alk. phosphatase 61 07/06/2018 08:04 AM    Protein, total 7.1 07/06/2018 08:04 AM    Albumin 4.3 07/06/2018 08:04 AM    Globulin 3.9 05/31/2015 07:58 PM    A-G Ratio 1.5 07/06/2018 08:04 AM    ALT (SGPT) 16 07/06/2018 08:04 AM     Lab Results   Component Value Date/Time    Cholesterol, total 193 11/16/2018 08:07 AM    HDL Cholesterol 42 11/16/2018 08:07 AM    LDL, calculated 134 (H) 11/16/2018 08:07 AM    VLDL, calculated 17 11/16/2018 08:07 AM    Triglyceride 83 11/16/2018 08:07 AM     Lab Results   Component Value Date/Time    Vitamin D 25-Hydroxy 12 (L) 02/01/2017 10:00 AM    VITAMIN D, 25-HYDROXY 14.1 (L) 11/16/2018 08:07 AM       Lab Results   Component Value Date/Time    Hemoglobin A1c 5.6 07/06/2018 08:04 AM     Lab Results   Component Value Date/Time    TSH 0.151 (L) 11/16/2018 08:07 AM    TSH 9.22 (H) 02/01/2017 10:00 AM       Lab Results   Component Value Date/Time    Microalb/Creat ratio (ug/mg creat.) 3.6 05/27/2016 10:16 AM         ASSESSMENT and PLAN      ICD-10-CM ICD-9-CM    1. Generalized abdominal pain R10.84 789.07 AMB POC URINE PREGNANCY TEST, VISUAL COLOR COMPARISON      URINALYSIS W/ RFLX MICROSCOPIC      CULTURE, URINE      METABOLIC PANEL, COMPREHENSIVE      SED RATE (ESR)      dicyclomine (BENTYL) 10 mg capsule      AMYLASE      LIPASE      pantoprazole (PROTONIX) 40 mg tablet    due to Viral vs constipaton vs other   2. Other fatigue R53.83 780.79 AMB POC VITALIY INFLUENZA A/B TEST      AMB POC URINE PREGNANCY TEST, VISUAL COLOR COMPARISON      URINALYSIS W/ RFLX MICROSCOPIC      CULTURE, URINE      METABOLIC PANEL, COMPREHENSIVE      TSH 3RD GENERATION      VITAMIN D, 25 HYDROXY      HCG QL SERUM      CBC W/O DIFF   3. Hypothyroidism due to acquired atrophy of thyroid E03.4 244.8 TSH 3RD GENERATION     246.8 T4, FREE      levothyroxine (SYNTHROID) 125 mcg tablet   4.  Chills (without fever) R68.83 780.64 AMB POC VITALIY INFLUENZA A/B TEST      URINALYSIS W/ RFLX MICROSCOPIC      CBC W/O DIFF AMB POC URINALYSIS DIP STICK AUTO W/O MICRO   5. Pure hypercholesterolemia E78.00 272.0    6. Other constipation K59.09 564.09 TSH 3RD GENERATION    due to thyroid vs decreased water vs other   7. Hyperglycemia R73.9 790.29 HEMOGLOBIN A1C WITH EAG      INSULIN   8. Vitamin D deficiency E55.9 268.9    9. Hyperinsulinemia E16.1 251.1 INSULIN   10. Heartburn R12 787.1 pantoprazole (PROTONIX) 40 mg tablet       Chart reviewed and updated. Encouraged the pt to sign up for MyChart to be able to view results and send me any questions or concerns prior to the next visit where we will go over results in detail. Continue current medications and care. Restart Synthroid every day on an empty stomach. Don't restart Saxenda until abdominal pain and appetite improve. Increase hydration and start Bentyl 10 mg prn for nausea and stomach spasm. Stop Tums because it can worsen constipation. Increase fiber and recommend OTC Magnesium 400 mg qPM for constipation. Start Protonix 40 mg qPM for heartburn. Take it 4 hours apart from Synthroid. Prescriptions written and sent to pharmacy; medication side effects discussed. Synthroid 125 mcg, Protonix 40 mg, Bentyl 10 mg.   Recheck pertinent labs today. Counseled patient on health concerns:  Abdominal pain, constipation, fatigue, chills, thyroid. Relevant handouts given and discussed with patient. Immunizations noted. Offered empathy, support, legitimation, prayers, partnership to patient. Praised patient for progress. Follow-up and Dispositions    · Return in about 2 months (around 3/24/2020) for results, thyroid, fatigue, constipation. Patient was offered a choice/choices in the treatment plan today. Patient expresses understanding of the plan and agrees with recommendations. Written by aakash Mayer, as dictated by Dr. Jeanie Estrada DO. Documentation True and Accepted by Jacob Valdez. Vivien Dunne.       Patient Instructions Indigestion (Dyspepsia or Heartburn): Care Instructions  Your Care Instructions  Sometimes it can be hard to pinpoint the cause of indigestion. (It is also called dyspepsia or heartburn.) Most cases of an upset stomach with bloating, burning, burping, and nausea are minor and go away within several hours. Home treatment and over-the-counter medicine often are able to control symptoms. But if you take medicine to relieve your indigestion without making diet and lifestyle changes, your symptoms are likely to return again and again. If you get indigestion often, it may be a sign of a more serious medical problem. Be sure to follow up with your doctor, who may want to do tests to be sure of the cause of your indigestion. Follow-up care is a key part of your treatment and safety. Be sure to make and go to all appointments, and call your doctor if you are having problems. It's also a good idea to know your test results and keep a list of the medicines you take. How can you care for yourself at home? · Your doctor may recommend over-the-counter medicine. For mild or occasional indigestion, antacids such as Gaviscon, Mylanta, Maalox, or Tums, may help. Be safe with medicines. Be careful when you take over-the-counter antacid medicines. Many of these medicines have aspirin in them. Read the label to make sure that you are not taking more than the recommended dose. Too much aspirin can be harmful. · Your doctor also may recommend over-the-counter acid reducers, such as Pepcid AC, Tagamet HB, Zantac 75, or Prilosec. Read and follow all instructions on the label. If you use these medicines often, talk with your doctor. · Change your eating habits. ? It's best to eat several small meals instead of two or three large meals. ? After you eat, wait 2 to 3 hours before you lie down. ? Chocolate, mint, and alcohol can make GERD worse. ?  Spicy foods, foods that have a lot of acid (like tomatoes and oranges), and coffee can make GERD symptoms worse in some people. If your symptoms are worse after you eat a certain food, you may want to stop eating that food to see if your symptoms get better. · Do not smoke or chew tobacco. Smoking can make GERD worse. If you need help quitting, talk to your doctor about stop-smoking programs and medicines. These can increase your chances of quitting for good. · If you have GERD symptoms at night, raise the head of your bed 6 to 8 inches. You can do this by putting the frame on blocks or placing a foam wedge under the head of your mattress. (Adding extra pillows does not work.)  · Do not wear tight clothing around your middle. · Lose weight if you need to. Losing just 5 to 10 pounds can help. · Do not take anti-inflammatory medicines, such as aspirin, ibuprofen (Advil, Motrin), or naproxen (Aleve). These can irritate the stomach. If you need a pain medicine, try acetaminophen (Tylenol), which does not cause stomach upset. When should you call for help? Call your doctor now or seek immediate medical care if:    · You have new or worse belly pain.     · You are vomiting.    Watch closely for changes in your health, and be sure to contact your doctor if:    · You have new or worse symptoms of indigestion.     · You have trouble or pain swallowing.     · You are losing weight.     · You do not get better as expected. Where can you learn more? Go to http://anna-alistair.info/. Enter X843 in the search box to learn more about \"Indigestion (Dyspepsia or Heartburn): Care Instructions. \"  Current as of: November 7, 2018  Content Version: 12.2  © 1654-7272 Healthwise, Incorporated. Care instructions adapted under license by [employer] (which disclaims liability or warranty for this information).  If you have questions about a medical condition or this instruction, always ask your healthcare professional. [address] any warranty or liability for your use of this information. HEARTBURN occurs when stomach acid moves back up through your esophagus. Several conditions and foods can contribute to this process. Common causes include:    Hiatal Hernia  Pregnancy  Alcohol use  Overweight or Obesity  Smoking. Consuming certain types of foods or drinks can increase the acid content of the stomach and cause heartburn. AVOID THESE TRIGGERS:     Stress  Alcoholic or carbonated beverages  Caffeine (coffee, tea, chocolate, soda)  Acidic foods or drinks (Oranges or orange juice, apples, apple juice, grapefruit or grapefruit juice, bananas, grape juice)  Tomatoes or tomato based foods (pizza, spaghetti, chili)  Greasy, Fatty or Fried foods  Spicy Foods (including hot sauce)  Garlic  Onions  Mint flavoring (peppermint, speramint, cinnamon). PREVENTIVE MEASURES    Do not wear tight, restrictive clothing. Lose weight. Elevate the head of the bed 4 to 6 inches with blocks or a wedge pillow. Wait 2 to 3 hours after a meal before lying down. Avoid the triggers. MEDICATIONS     Try over the counter medications if needed first.  These include:  TUMS, ROLAIDS, Mylanta, Prilosec 20 mg, Pepcid 20 mg, Tagamet, Zantac up to 150 mg twice daily or 300 mg daily, Nexium 20 mg up to 40 mg daily and Prevacid up to 30 mg daily. Ideally, take for 2 weeks after symptoms consistently appear. NOTIFY OUR OFFICE    If symptoms worsen or persist.   If breathing or swallowing becomes difficult. If you regurgitate blood. DIAL 911 IF THE HEART BURN SYMPTOMS ARE ACCOMPANIED BY   Shortness of breath, sweating, pain in the jaw, neck or arm, cold clammy feeling with nausea or vomiting. This could be a HEART ATTACK.

## 2020-01-24 NOTE — PATIENT INSTRUCTIONS
Indigestion (Dyspepsia or Heartburn): Care Instructions Your Care Instructions Sometimes it can be hard to pinpoint the cause of indigestion. (It is also called dyspepsia or heartburn.) Most cases of an upset stomach with bloating, burning, burping, and nausea are minor and go away within several hours. Home treatment and over-the-counter medicine often are able to control symptoms. But if you take medicine to relieve your indigestion without making diet and lifestyle changes, your symptoms are likely to return again and again. If you get indigestion often, it may be a sign of a more serious medical problem. Be sure to follow up with your doctor, who may want to do tests to be sure of the cause of your indigestion. Follow-up care is a key part of your treatment and safety. Be sure to make and go to all appointments, and call your doctor if you are having problems. It's also a good idea to know your test results and keep a list of the medicines you take. How can you care for yourself at home? · Your doctor may recommend over-the-counter medicine. For mild or occasional indigestion, antacids such as Gaviscon, Mylanta, Maalox, or Tums, may help. Be safe with medicines. Be careful when you take over-the-counter antacid medicines. Many of these medicines have aspirin in them. Read the label to make sure that you are not taking more than the recommended dose. Too much aspirin can be harmful. · Your doctor also may recommend over-the-counter acid reducers, such as Pepcid AC, Tagamet HB, Zantac 75, or Prilosec. Read and follow all instructions on the label. If you use these medicines often, talk with your doctor. · Change your eating habits. ? It's best to eat several small meals instead of two or three large meals. ? After you eat, wait 2 to 3 hours before you lie down. ? Chocolate, mint, and alcohol can make GERD worse. ?  Spicy foods, foods that have a lot of acid (like tomatoes and oranges), and coffee can make GERD symptoms worse in some people. If your symptoms are worse after you eat a certain food, you may want to stop eating that food to see if your symptoms get better. · Do not smoke or chew tobacco. Smoking can make GERD worse. If you need help quitting, talk to your doctor about stop-smoking programs and medicines. These can increase your chances of quitting for good. · If you have GERD symptoms at night, raise the head of your bed 6 to 8 inches. You can do this by putting the frame on blocks or placing a foam wedge under the head of your mattress. (Adding extra pillows does not work.) · Do not wear tight clothing around your middle. · Lose weight if you need to. Losing just 5 to 10 pounds can help. · Do not take anti-inflammatory medicines, such as aspirin, ibuprofen (Advil, Motrin), or naproxen (Aleve). These can irritate the stomach. If you need a pain medicine, try acetaminophen (Tylenol), which does not cause stomach upset. When should you call for help? Call your doctor now or seek immediate medical care if: 
  · You have new or worse belly pain.  
  · You are vomiting.  
 Watch closely for changes in your health, and be sure to contact your doctor if: 
  · You have new or worse symptoms of indigestion.  
  · You have trouble or pain swallowing.  
  · You are losing weight.  
  · You do not get better as expected. Where can you learn more? Go to http://anna-alistair.info/. Enter L390 in the search box to learn more about \"Indigestion (Dyspepsia or Heartburn): Care Instructions. \" Current as of: November 7, 2018 Content Version: 12.2 © 8337-0111 Healthwise, Incorporated. Care instructions adapted under license by [employer] (which disclaims liability or warranty for this information).  If you have questions about a medical condition or this instruction, always ask your healthcare professional. [employer] disclaims any warranty or liability for your use of this information. HEARTBURN occurs when stomach acid moves back up through your esophagus. Several conditions and foods can contribute to this process. Common causes include: 
 
Hiatal Hernia Pregnancy Alcohol use Overweight or Obesity Smoking. Consuming certain types of foods or drinks can increase the acid content of the stomach and cause heartburn. AVOID THESE TRIGGERS:  
 
Stress Alcoholic or carbonated beverages Caffeine (coffee, tea, chocolate, soda) Acidic foods or drinks (Oranges or orange juice, apples, apple juice, grapefruit or grapefruit juice, bananas, grape juice) Tomatoes or tomato based foods (pizza, spaghetti, chili) Greasy, Fatty or Fried foods Spicy Foods (including hot sauce) Garlic Onions Mint flavoring (peppermint, speramint, cinnamon). PREVENTIVE MEASURES Do not wear tight, restrictive clothing. Lose weight. Elevate the head of the bed 4 to 6 inches with blocks or a wedge pillow. Wait 2 to 3 hours after a meal before lying down. Avoid the triggers. MEDICATIONS Try over the counter medications if needed first.  These include: 
TUMS, ROLAIDS, Mylanta, Prilosec 20 mg, Pepcid 20 mg, Tagamet, Zantac up to 150 mg twice daily or 300 mg daily, Nexium 20 mg up to 40 mg daily and Prevacid up to 30 mg daily. Ideally, take for 2 weeks after symptoms consistently appear. NOTIFY OUR OFFICE If symptoms worsen or persist.  
If breathing or swallowing becomes difficult. If you regurgitate blood. DIAL 911 IF THE HEART BURN SYMPTOMS ARE ACCOMPANIED BY Shortness of breath, sweating, pain in the jaw, neck or arm, cold clammy feeling with nausea or vomiting. This could be a HEART ATTACK.

## 2020-01-24 NOTE — PROGRESS NOTES
Adrian Rollins is a 29 y.o. female  Chief Complaint   Patient presents with    Abdominal Pain     Also wants checked for pregnancy. Health Maintenance Due   Topic Date Due    PAP AKA CERVICAL CYTOLOGY  07/18/2018    Influenza Age 5 to Adult  08/01/2019     Visit Vitals  /81   Pulse 90   Temp 98.2 °F (36.8 °C) (Oral)   Resp 18   Ht 5' 3\" (1.6 m)   Wt 240 lb 8 oz (109.1 kg)   SpO2 99%   BMI 42.60 kg/m²     1. Have you been to the ER, urgent care clinic since your last visit? Hospitalized since your last visit? No    2. Have you seen or consulted any other health care providers outside of the [address] [place] [address] since your last visit? Include any pap smears or colon screening.  No

## 2020-01-26 LAB
25(OH)D3+25(OH)D2 SERPL-MCNC: 13.4 NG/ML (ref 30–100)
ALBUMIN SERPL-MCNC: 4.2 G/DL (ref 3.8–4.8)
ALBUMIN/GLOB SERPL: 1.6 {RATIO} (ref 1.2–2.2)
ALP SERPL-CCNC: 61 IU/L (ref 39–117)
ALT SERPL-CCNC: 17 IU/L (ref 0–32)
AMYLASE SERPL-CCNC: 69 U/L (ref 31–110)
APPEARANCE UR: CLEAR
AST SERPL-CCNC: 16 IU/L (ref 0–40)
B-HCG SERPL QL: NEGATIVE MIU/ML
BACTERIA UR CULT: NORMAL
BILIRUB SERPL-MCNC: 0.4 MG/DL (ref 0–1.2)
BILIRUB UR QL STRIP: NEGATIVE
BUN SERPL-MCNC: 5 MG/DL (ref 6–20)
BUN/CREAT SERPL: 11 (ref 9–23)
CALCIUM SERPL-MCNC: 9.3 MG/DL (ref 8.7–10.2)
CHLORIDE SERPL-SCNC: 100 MMOL/L (ref 96–106)
CO2 SERPL-SCNC: 20 MMOL/L (ref 20–29)
COLOR UR: YELLOW
CREAT SERPL-MCNC: 0.45 MG/DL (ref 0.57–1)
ERYTHROCYTE [DISTWIDTH] IN BLOOD BY AUTOMATED COUNT: 13.6 % (ref 11.7–15.4)
ERYTHROCYTE [SEDIMENTATION RATE] IN BLOOD BY WESTERGREN METHOD: 9 MM/HR (ref 0–32)
EST. AVERAGE GLUCOSE BLD GHB EST-MCNC: 123 MG/DL
GLOBULIN SER CALC-MCNC: 2.7 G/DL (ref 1.5–4.5)
GLUCOSE SERPL-MCNC: 94 MG/DL (ref 65–99)
GLUCOSE UR QL: NEGATIVE
HBA1C MFR BLD: 5.9 % (ref 4.8–5.6)
HCT VFR BLD AUTO: 36.4 % (ref 34–46.6)
HGB BLD-MCNC: 11.7 G/DL (ref 11.1–15.9)
HGB UR QL STRIP: NEGATIVE
INSULIN SERPL-ACNC: 72.4 UIU/ML (ref 2.6–24.9)
KETONES UR QL STRIP: NEGATIVE
LEUKOCYTE ESTERASE UR QL STRIP: NEGATIVE
LIPASE SERPL-CCNC: 23 U/L (ref 14–72)
MCH RBC QN AUTO: 25.3 PG (ref 26.6–33)
MCHC RBC AUTO-ENTMCNC: 32.1 G/DL (ref 31.5–35.7)
MCV RBC AUTO: 79 FL (ref 79–97)
MICRO URNS: NORMAL
NITRITE UR QL STRIP: NEGATIVE
PH UR STRIP: 7.5 [PH] (ref 5–7.5)
PLATELET # BLD AUTO: 427 X10E3/UL (ref 150–450)
POTASSIUM SERPL-SCNC: 4.2 MMOL/L (ref 3.5–5.2)
PROT SERPL-MCNC: 6.9 G/DL (ref 6–8.5)
PROT UR QL STRIP: NEGATIVE
RBC # BLD AUTO: 4.62 X10E6/UL (ref 3.77–5.28)
SODIUM SERPL-SCNC: 138 MMOL/L (ref 134–144)
SP GR UR: 1.01 (ref 1–1.03)
T4 FREE SERPL-MCNC: 0.85 NG/DL (ref 0.82–1.77)
TSH SERPL DL<=0.005 MIU/L-ACNC: 9.4 UIU/ML (ref 0.45–4.5)
UROBILINOGEN UR STRIP-MCNC: 1 MG/DL (ref 0.2–1)
WBC # BLD AUTO: 10.1 X10E3/UL (ref 3.4–10.8)

## 2020-01-31 PROBLEM — E66.01 OBESITY, MORBID (HCC): Status: ACTIVE | Noted: 2020-01-31

## 2020-02-03 ENCOUNTER — OFFICE VISIT (OUTPATIENT)
Dept: URGENT CARE | Age: 35
End: 2020-02-03

## 2020-02-03 VITALS
TEMPERATURE: 102.2 F | HEART RATE: 118 BPM | HEIGHT: 63 IN | OXYGEN SATURATION: 97 % | WEIGHT: 237 LBS | BODY MASS INDEX: 41.99 KG/M2 | SYSTOLIC BLOOD PRESSURE: 136 MMHG | DIASTOLIC BLOOD PRESSURE: 67 MMHG | RESPIRATION RATE: 18 BRPM

## 2020-02-03 DIAGNOSIS — J09.X2 INFLUENZA A (H5N1): ICD-10-CM

## 2020-02-03 DIAGNOSIS — R50.9 FEVER, UNSPECIFIED FEVER CAUSE: ICD-10-CM

## 2020-02-03 LAB
FLUAV+FLUBV AG NOSE QL IA.RAPID: NEGATIVE POS/NEG
FLUAV+FLUBV AG NOSE QL IA.RAPID: POSITIVE POS/NEG
VALID INTERNAL CONTROL?: YES

## 2020-02-03 RX ORDER — OSELTAMIVIR PHOSPHATE 75 MG/1
75 CAPSULE ORAL 2 TIMES DAILY
Qty: 10 CAP | Refills: 0 | Status: SHIPPED | OUTPATIENT
Start: 2020-02-03 | End: 2020-02-08

## 2020-02-03 RX ORDER — IBUPROFEN 400 MG/1
800 TABLET ORAL
Qty: 2 TAB | Refills: 0 | Status: SHIPPED | COMMUNITY
Start: 2020-02-03 | End: 2020-02-03

## 2020-02-03 NOTE — PROGRESS NOTES
The history is provided by the patient. Cold Symptoms   The history is provided by the patient. This is a new problem. The current episode started more than 2 days ago. There has been a fever of 102 - 102.9 F. Fever duration: started yesterday. Associated symptoms include chills, sweats, ear congestion, headaches and rhinorrhea. Pertinent negatives include no chest pain, no sore throat, no shortness of breath, no wheezing, no nausea and no vomiting. Associated symptoms comments: Generalized body aches. She has tried nothing for the symptoms. Past Medical History:   Diagnosis Date    Allergy, unspecified not elsewhere classified     Ankle sprain     bilaterally. 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 Dr. Duane Mellow, OB. Ny Stack, Genetic Counselor   Li Powell.  Elevated liver enzymes     Heart palpitations 2016    Exertional.  Dr. Hailey Lange. Negative Exercise Stress Test.    Hyperinsulinemia 2015    Hypothyroidism due to acquired atrophy of thyroid     Morbid obesity due to excess calories ([address] 2017    Obesity, Class II, BMI 35-39.9, with comorbidity 2017    Pre-diabetes 2017    Previous on Metformin for treatment.  Pure hypercholesterolemia 2013        Past Surgical History:   Procedure Laterality Date    HX  SECTION      HX DILATION AND CURETTAGE  2016    due to Cystic Hygroma/Terrell's Syndrome fetus. Dr. Creasie Crigler   (placed),  (removed)    IUD. Dr. Sylwia Ivey.  HX TONSIL AND ADENOIDECTOMY  childhood    HX WISDOM TEETH EXTRACTION  2016    All 4 removed.          Family History   Problem Relation Age of Onset   Michel Goddard Arthritis-osteo Mother     Hypertension Mother    Michel Goddard Migraines Mother     Other Mother         IBS    Hypertension Maternal Uncle     Asthma Maternal Uncle     Hypertension Maternal Grandmother     Other Maternal Grandmother         diverticulosis    Arthritis-osteo Maternal Grandmother     Cancer Maternal Grandfather         prostate    Arthritis-osteo Maternal Grandfather     Other Maternal Aunt         uterine fibroids    Obesity Other         Social History     Socioeconomic History    Marital status: SINGLE     Spouse name: Not on file    Number of children: 1    Years of education: Not on file    Highest education level: Not on file   Occupational History    Not on file   Social Needs    Financial resource strain: Not on file    Food insecurity:     Worry: Not on file     Inability: Not on file    Transportation needs:     Medical: Not on file     Non-medical: Not on file   Tobacco Use    Smoking status: Never Smoker    Smokeless tobacco: Never Used   Substance and Sexual Activity    Alcohol use: Yes     Alcohol/week: 1.0 standard drinks     Types: 1 Glasses of wine per week     Comment: [place] - 1 every 2 weeks    Drug use: No    Sexual activity: Yes     Partners: Male     Birth control/protection: Condom   Lifestyle    Physical activity:     Days per week: Not on file     Minutes per session: Not on file    Stress: Not on file   Relationships    Social connections:     Talks on phone: Not on file     Gets together: Not on file     Attends Zoroastrianism service: Not on file     Active member of club or organization: Not on file     Attends meetings of clubs or organizations: Not on file     Relationship status: Not on file    Intimate partner violence:     Fear of current or ex partner: Not on file     Emotionally abused: Not on file     Physically abused: Not on file     Forced sexual activity: Not on file   Other Topics Concern    Not on file   Social History Narrative    Not on file                ALLERGIES: Latex; Other medication; Metformin; and Mirena [levonorgestrel]    Review of Systems   Constitutional: Positive for chills, fatigue and fever.    HENT: Positive for congestion, postnasal drip and rhinorrhea. Negative for sinus pressure, sinus pain, sore throat, trouble swallowing and voice change. Respiratory: Positive for cough. Negative for chest tightness, shortness of breath and wheezing. Cardiovascular: Negative for chest pain. Gastrointestinal: Negative for diarrhea, nausea and vomiting. Musculoskeletal:        Generalized body aches   Skin: Negative. Neurological: Positive for headaches. Vitals:    02/03/20 1129   BP: 136/67   Pulse: (!) 118   Resp: 18   Temp: (!) 102.2 °F (39 °C)   SpO2: 97%   Weight: 237 lb (107.5 kg)   Height: 5' 3\" (1.6 m)       Recent Results (from the past 12 hour(s))   AMB POC VITALIY INFLUENZA A/B TEST    Collection Time: 02/03/20 11:33 AM   Result Value Ref Range    VALID INTERNAL CONTROL POC Yes     Influenza A Ag POC Positive Negative Pos/Neg    Influenza B Ag POC Negative Negative Pos/Neg       Physical Exam  Constitutional:       Appearance: Normal appearance. She is well-developed. HENT:      Left Ear: Tympanic membrane normal.      Nose: Congestion and rhinorrhea present. Mouth/Throat:      Pharynx: No oropharyngeal exudate or posterior oropharyngeal erythema. Eyes:      Conjunctiva/sclera: Conjunctivae normal.      Pupils: Pupils are equal, round, and reactive to light. Neck:      Musculoskeletal: Normal range of motion. Cardiovascular:      Rate and Rhythm: Normal rate and regular rhythm. Heart sounds: Normal heart sounds. Pulmonary:      Effort: Pulmonary effort is normal.      Breath sounds: Normal breath sounds. Musculoskeletal: Normal range of motion. Lymphadenopathy:      Cervical: No cervical adenopathy. Skin:     General: Skin is warm and dry. Neurological:      Mental Status: She is alert and oriented to person, place, and time.    Psychiatric:         Mood and Affect: Mood normal.         MDM     Differential Diagnosis; Clinical Impression; Plan:     (J09.X2) Influenza A (H5N1)  (R50.9) Fever, unspecified fever cause  Orders Placed This Encounter      oseltamivir (TAMIFLU) 75 mg capsule          Sig: Take 1 Cap by mouth two (2) times a day for 5 days. Dispense:  10 Cap          Refill:  0      ibuprofen (MOTRIN) 400 mg tablet          Sig: Take 2 Tabs by mouth now for 1 dose. Dispense:  2 Tab          Refill:  0    Advised to stay hydrated and use ibuprofen or tylenol for fever and discomfort. The patients condition was discussed with the patient and they understand. The patient is to follow up with PCP. If signs and symptoms become worse the pt is to go to the ER. The patient is to take medications as prescribed. AVS given with patient instructions upon discharge.                   Procedures

## 2020-02-03 NOTE — PATIENT INSTRUCTIONS
Influenza (Flu): Care Instructions Your Care Instructions Influenza (flu) is an infection in the lungs and breathing passages. It is caused by the influenza virus. There are different strains, or types, of the flu virus from year to year. Unlike the common cold, the flu comes on suddenly and the symptoms, such as a cough, congestion, fever, chills, fatigue, aches, and pains, are more severe. These symptoms may last up to 10 days. Although the flu can make you feel very sick, it usually doesn't cause serious health problems. Home treatment is usually all you need for flu symptoms. But your doctor may prescribe antiviral medicine to prevent other health problems, such as pneumonia, from developing. Older people and those who have a long-term health condition, such as lung disease, are most at risk for having pneumonia or other health problems. Follow-up care is a key part of your treatment and safety. Be sure to make and go to all appointments, and call your doctor if you are having problems. It's also a good idea to know your test results and keep a list of the medicines you take. How can you care for yourself at home? · Get plenty of rest. 
· Drink plenty of fluids, enough so that your urine is light yellow or clear like water. If you have kidney, heart, or liver disease and have to limit fluids, talk with your doctor before you increase the amount of fluids you drink. · Take an over-the-counter pain medicine if needed, such as acetaminophen (Tylenol), ibuprofen (Advil, Motrin), or naproxen (Aleve), to relieve fever, headache, and muscle aches. Read and follow all instructions on the label. No one younger than 20 should take aspirin. It has been linked to Reye syndrome, a serious illness. · Do not smoke. Smoking can make the flu worse. If you need help quitting, talk to your doctor about stop-smoking programs and medicines. These can increase your chances of quitting for good. · Breathe moist air from a hot shower or from a sink filled with hot water to help clear a stuffy nose. · Before you use cough and cold medicines, check the label. These medicines may not be safe for young children or for people with certain health problems. · If the skin around your nose and lips becomes sore, put some petroleum jelly on the area. · To ease coughing: ? Drink fluids to soothe a scratchy throat. ? Suck on cough drops or plain hard candy. ? Take an over-the-counter cough medicine that contains dextromethorphan to help you get some sleep. Read and follow all instructions on the label. ? Raise your head at night with an extra pillow. This may help you rest if coughing keeps you awake. · Take any prescribed medicine exactly as directed. Call your doctor if you think you are having a problem with your medicine. To avoid spreading the flu · Wash your hands regularly, and keep your hands away from your face. · Stay home from school, work, and other public places until you are feeling better and your fever has been gone for at least 24 hours. The fever needs to have gone away on its own without the help of medicine. · Ask people living with you to talk to their doctors about preventing the flu. They may get antiviral medicine to keep from getting the flu from you. · To prevent the flu in the future, get a flu vaccine every fall. Encourage people living with you to get the vaccine. · Cover your mouth when you cough or sneeze. When should you call for help? Call 911 anytime you think you may need emergency care.  For example, call if: 
  · You have severe trouble breathing.  
 Call your doctor now or seek immediate medical care if: 
  · You have new or worse trouble breathing.  
  · You seem to be getting much sicker.  
  · You feel very sleepy or confused.  
  · You have a new or higher fever.  
  · You get a new rash.  
 Watch closely for changes in your health, and be sure to contact your doctor if: 
  · You begin to get better and then get worse.  
  · You are not getting better after 1 week. Where can you learn more? Go to http://anna-alistair.info/. Enter L255 in the search box to learn more about \"Influenza (Flu): Care Instructions. \" Current as of: June 9, 2019 Content Version: 12.2 © 6762-4981 [employer]. Care instructions adapted under license by [employer] (which disclaims liability or warranty for this information). If you have questions about a medical condition or this instruction, always ask your healthcare professional. [place] [address] any warranty or liability for your use of this information.

## 2020-02-03 NOTE — LETTER
[address] [facility] 
[facility] [address] 
[phone] Work/School Note Date: 2/3/2020 To Whom It May concern: 
 
Jarad Leo was seen and treated today in the urgent care center by the following:  SHRAVAN Daniel may return to work on 2/7/2020. Sincerely, Naveed Adrian NP

## 2020-02-05 ENCOUNTER — TELEPHONE (OUTPATIENT)
Dept: FAMILY MEDICINE CLINIC | Age: 35
End: 2020-02-05

## 2020-02-05 NOTE — TELEPHONE ENCOUNTER
----- Message from Immanuel Hernández sent at 2/5/2020  9:38 AM EST -----  Regarding: Dr. Jeff Wright: [phone]  General Message/Vendor Calls    Caller's first and last name: Smith Turner      Reason for call: pts job has faxed paperwork to be completed for leave of absence. Please complete by Friday and fax back. Pt notes from  Urgent care on [facility] is in the system as well.       Callback required yes/no and why:yes      Best contact number(s):[phone]      Details to clarify the request:      Immanuel Hernández

## 2021-05-13 ENCOUNTER — OFFICE VISIT (OUTPATIENT)
Dept: FAMILY MEDICINE CLINIC | Age: 36
End: 2021-05-13

## 2021-05-13 VITALS
TEMPERATURE: 98 F | SYSTOLIC BLOOD PRESSURE: 126 MMHG | HEART RATE: 89 BPM | BODY MASS INDEX: 42.06 KG/M2 | HEIGHT: 63 IN | RESPIRATION RATE: 16 BRPM | OXYGEN SATURATION: 98 % | WEIGHT: 237.4 LBS | DIASTOLIC BLOOD PRESSURE: 82 MMHG

## 2021-05-13 DIAGNOSIS — E03.4 HYPOTHYROIDISM DUE TO ACQUIRED ATROPHY OF THYROID: Primary | ICD-10-CM

## 2021-05-13 DIAGNOSIS — E55.9 VITAMIN D DEFICIENCY: ICD-10-CM

## 2021-05-13 DIAGNOSIS — Z13.220 SCREENING FOR CHOLESTEROL LEVEL: ICD-10-CM

## 2021-05-13 DIAGNOSIS — E03.4 HYPOTHYROIDISM DUE TO ACQUIRED ATROPHY OF THYROID: ICD-10-CM

## 2021-05-13 DIAGNOSIS — R73.03 PREDIABETES: ICD-10-CM

## 2021-05-13 DIAGNOSIS — E66.01 OBESITY, MORBID (HCC): ICD-10-CM

## 2021-05-13 DIAGNOSIS — E55.9 VITAMIN D DEFICIENCY: Primary | ICD-10-CM

## 2021-05-13 LAB
25(OH)D3 SERPL-MCNC: 13.2 NG/ML (ref 30–100)
CHOLEST SERPL-MCNC: 248 MG/DL
EST. AVERAGE GLUCOSE BLD GHB EST-MCNC: 123 MG/DL
HBA1C MFR BLD: 5.9 % (ref 4–5.6)
HDLC SERPL-MCNC: 53 MG/DL
HDLC SERPL: 4.7 {RATIO} (ref 0–5)
LDLC SERPL CALC-MCNC: 167.8 MG/DL (ref 0–100)
T4 FREE SERPL-MCNC: 0.8 NG/DL (ref 0.8–1.5)
TRIGL SERPL-MCNC: 136 MG/DL (ref ?–150)
TSH SERPL DL<=0.05 MIU/L-ACNC: 15.3 UIU/ML (ref 0.36–3.74)
VLDLC SERPL CALC-MCNC: 27.2 MG/DL

## 2021-05-13 PROCEDURE — 99214 OFFICE O/P EST MOD 30 MIN: CPT | Performed by: STUDENT IN AN ORGANIZED HEALTH CARE EDUCATION/TRAINING PROGRAM

## 2021-05-13 RX ORDER — CETIRIZINE HCL 10 MG
TABLET ORAL
COMMUNITY
End: 2022-02-04

## 2021-05-13 RX ORDER — LEVOTHYROXINE SODIUM 125 UG/1
TABLET ORAL
Qty: 30 TAB | Refills: 0 | Status: SHIPPED | OUTPATIENT
Start: 2021-05-13 | End: 2021-05-13

## 2021-05-13 RX ORDER — LEVOTHYROXINE SODIUM 175 UG/1
175 TABLET ORAL
Qty: 90 TAB | Refills: 0 | Status: SHIPPED | OUTPATIENT
Start: 2021-05-13 | End: 2021-11-23

## 2021-05-13 RX ORDER — ERGOCALCIFEROL 1.25 MG/1
50000 CAPSULE ORAL
Qty: 13 CAP | Refills: 3 | Status: SHIPPED | OUTPATIENT
Start: 2021-05-13

## 2021-05-13 NOTE — PROGRESS NOTES
[address] [address] CARLO Hurtado. [place], [address] [place] [address]  [phone]    C/C: Hypothyroidism, vitamin D deficiency and medication management    HPI:    Lorenzo Salmon is a 28 y.o. female who presents to clinic today for evaluation of the issues listed above. Pt is new to the practice     Previous pcp: Dr. Ashkan Birmingham, [facility], now out of office. PMHx: Reviewed and updated under problems. Current concerns; Hypothyroidism: Takes Synthroid 125mcg daily    Last TSH (01/2020): 9.4. Last office visit was more than a year ago. Complains of fatigue and intermittent constipation. Vitamin D deficiency:  Previously on once weekly Vit D supplements which she stopped taking. Now takes Multiple vitamins. Obesity: Previously on Saxenda in 2019 for weight loss but not currently taking. Initially had some weight loss but states that it stopped working. Pt denies any  fever, chill, night sweats, chest pain, pressure, SOB. Other Health Habits and social history:  Smoking history: no  Alcohol history: socially  Occupation: works for a [email or web address]  Marital status: Patient is currently single and has one 15 yo son. Current Medications: Reviewed and updated in the chart. Allergies- reviewed: Allergies   Allergen Reactions    Latex Itching    Other Medication Rash     SPRINTEC    Metformin Diarrhea     severe    Mirena [Levonorgestrel] Other (comments)     Severe abdominal pain  GALACTORRHEA       Medications- reviewed:   Current Outpatient Medications   Medication Sig    cetirizine (ZyrTEC) 10 mg tablet Take  by mouth daily as needed for Allergies.  levothyroxine (SYNTHROID) 125 mcg tablet TAKE 1 TABLET BY MOUTH EVERY DAY BEFORE BREAKFAST  Indications: a condition with low thyroid hormone levels    pantoprazole (PROTONIX) 40 mg tablet Take 1 Tab by mouth daily.     dicyclomine (BENTYL) 10 mg capsule Take 1 Cap by mouth three (3) times daily. Indications: abdominal spasms     No current facility-administered medications for this visit. Past Medical History- reviewed:  Past Medical History:   Diagnosis Date    Allergy, unspecified not elsewhere classified     Ankle sprain     bilaterally. 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 Dr. Wesley Georges, OB. Keily Hidalgo, Genetic Counselor   Edgardo Jenkins.  Elevated liver enzymes     Heart palpitations 2016    Exertional.  Dr. Lisa Chavira. Negative Exercise Stress Test.    Hyperinsulinemia 2015    Hypothyroidism due to acquired atrophy of thyroid     Morbid obesity due to excess calories ([facility] [address] 2017    Obesity, Class II, BMI 35-39.9, with comorbidity 2017    Pre-diabetes 2017    Previous on Metformin for treatment.  Pure hypercholesterolemia 2013       Past Surgical History- reviewed:   Past Surgical History:   Procedure Laterality Date    HX  SECTION      HX DILATION AND CURETTAGE  2016    due to Cystic Hygroma/Terrell's Syndrome fetus. Dr. Jj Chahal   (placed),  (removed)    IUD. Dr. Yinka Jefferson.  HX TONSIL AND ADENOIDECTOMY  childhood    HX WISDOM TEETH EXTRACTION  2016    All 4 removed.        Family History - reviewed:  Family History   Problem Relation Age of Onset   Shellie Sit Arthritis-osteo Mother     Hypertension Mother    Shellie Sit Migraines Mother     Other Mother         IBS    Hypertension Maternal Uncle     Asthma Maternal Uncle     Hypertension Maternal Grandmother     Other Maternal Grandmother         diverticulosis    Arthritis-osteo Maternal Grandmother     Cancer Maternal Grandfather         prostate    Arthritis-osteo Maternal Grandfather     Other Maternal Aunt         uterine fibroids    Obesity Other        Social History - reviewed:  Social History     Socioeconomic History    Marital status: SINGLE     Spouse name: Not on file    Number of children: 1    Years of education: Not on file    Highest education level: Not on file   Occupational History    Not on file   Social Needs    Financial resource strain: Not on file    Food insecurity     Worry: Not on file     Inability: Not on file    Transportation needs     Medical: Not on file     Non-medical: Not on file   Tobacco Use    Smoking status: Never Smoker    Smokeless tobacco: Never Used   Substance and Sexual Activity    Alcohol use: Yes     Alcohol/week: 2.0 standard drinks     Types: 2 Glasses of wine per week     Comment: [place] - 1 -2  Every weekend    Drug use: No    Sexual activity: Not Currently     Partners: Male     Birth control/protection: Condom   Lifestyle    Physical activity     Days per week: Not on file     Minutes per session: Not on file    Stress: Not on file   Relationships    Social connections     Talks on phone: Not on file     Gets together: Not on file     Attends Gnosticist service: Not on file     Active member of club or organization: Not on file     Attends meetings of clubs or organizations: Not on file     Relationship status: Not on file    Intimate partner violence     Fear of current or ex partner: Not on file     Emotionally abused: Not on file     Physically abused: Not on file     Forced sexual activity: Not on file   Other Topics Concern    Not on file   Social History Narrative    Not on file       Review of systems:     A comprehensive review of systems was negative except for that written in the History of Present Illness. Visit Vitals  /82 (BP 1 Location: Right arm, BP Patient Position: Sitting, BP Cuff Size: Adult)   Pulse 89   Temp 98 °F (36.7 °C) (Temporal)   Resp 16   Ht 5' 3\" (1.6 m)   Wt 237 lb 6.4 oz (107.7 kg)   LMP 04/17/2021   SpO2 98%   BMI 42.05 kg/m²       General: Alert and oriented, in no acute distress. Well nourished. EYE: PERRL. Sclera and conjuctival clear.  Extraocular movements intact. EARS: External normal, canals clear, tympanic membranes normal.   NOSE: Mucosa healthy without drainage or ulceration. OROPHARYNX: No suspicious lesions, normal dentition, pharynx, tongue and tonsils normal.  NECK: Supple; no masses; thyroid normal.  LUNGS: Respirations unlabored; clear to auscultation bilaterally. CARDIOVASCULAR: Regular, rate, and rhythm without murmurs, gallops or rubs. ABDOMEN: Soft; nontender; nondistended; normoactive bowel sounds; no masses or organomegaly. MUSCULOSKELETAL: FROM in all extremities     EXT: No edema. Neurovascularlly intact. Normal gait. SKIN: No rash. No suspicious lesions or moles. Neuro: Mental Status: Pt is alert and oriented to person, place, and time. Assessment/Plan       ICD-10-CM ICD-9-CM    1. Hypothyroidism due to acquired atrophy of thyroid  E03.4 244.8 levothyroxine (SYNTHROID) 125 mcg tablet     246.8 T4, FREE      TSH 3RD GENERATION      TSH 3RD GENERATION      T4, FREE   2. Prediabetes  R73.03 790.29 HEMOGLOBIN A1C WITH EAG      HEMOGLOBIN A1C WITH EAG   3. Vitamin D deficiency  E55.9 268.9 VITAMIN D, 25 HYDROXY      VITAMIN D, 25 HYDROXY   4. Screening for cholesterol level  Z13.220 V77.91 LIPID PANEL      LIPID PANEL   5. Obesity, morbid ([facility])  E66.01 278.01        1. Hypothyroidism due to acquired atrophy of thyroid  Last TSH (01/2020): 9.4%. Complains of intermittent constipation and fatigue which is possible due to poorly controlled TSH. Will recheck levels before any adjustments. -  levothyroxine (SYNTHROID) 125 mcg tablet; TAKE 1 TABLET BY MOUTH EVERY DAY BEFORE BREAKFAST  Indications: a condition with low thyroid hormone levels    - T4, FREE; Future  - TSH 3RD GENERATION; Future    2. Prediabetes  - HEMOGLOBIN A1C WITH EAG; Future    3. Vitamin D deficiency  - VITAMIN D, 25 HYDROXY; Future  - continue daily vit D supplements    4. Screening for cholesterol level  - LIPID PANEL; Future    5.  Obesity, morbid ([facility] [address]  Body mass index is 42.05 kg/m². Discussed importance of diet and exercise. Emphasized plant diet and increasing fruit and vegetable intake to 5-10 servings a day, drink 8-10 glasses of water a day(especially with meals), avoid sodas, fried foods, fast food. Encouraged to increase workout to 150 mins. Follow up: 8 weeks    On this date 05/13/21 I have spent 35 minutes reviewing previous notes, test results and face to face with the patient discussing the diagnosis and importance of compliance with the treatment plan as well as documenting on the day of the visit. I have discussed the diagnosis with the patient and the intended plan as seen in the above orders. The patient has received an after-visit summary and questions were answered concerning future plans. I have discussed medication side effects and warnings with the patient as well. Informed patient to return to the office if new symptoms arise.     Signed By: Halina Zapata MD     May 13, 2021

## 2021-05-13 NOTE — PROGRESS NOTES
Reviewed. TSH 15.3 (up from 9.4). On 125 mcg synthroid. Low VIt D  Stable A1C - 5.9  Abd lipid panel. Called and discuss findings with patient. Will increase synthroid to 175 mcg daily. Recheck in 8 weeks.

## 2021-05-13 NOTE — PROGRESS NOTES
Name and  Verified. Former patient of Dr. Emily Portillo    Chief Complaint   Patient presents with   [facility] New Patient    Establish Care         1. Have you been to the ER, urgent care clinic since your last visit? Hospitalized since your last visit? No    2. Have you seen or consulted any other health care providers outside of the [address] [place] [address] since your last visit? Include any pap smears or colon screening.  No    Health Maintenance Due   Topic Date Due    Hepatitis C Screening  Never done    COVID-19 Vaccine (1) Never done    PAP AKA CERVICAL CYTOLOGY  2018

## 2021-08-31 ENCOUNTER — OFFICE VISIT (OUTPATIENT)
Dept: URGENT CARE | Age: 36
End: 2021-08-31
Payer: COMMERCIAL

## 2021-08-31 VITALS — TEMPERATURE: 98.2 F | RESPIRATION RATE: 18 BRPM | HEART RATE: 94 BPM | OXYGEN SATURATION: 97 %

## 2021-08-31 DIAGNOSIS — Z20.822 ENCOUNTER FOR LABORATORY TESTING FOR COVID-19 VIRUS: Primary | ICD-10-CM

## 2021-08-31 LAB — SARS-COV-2 POC: NEGATIVE

## 2021-08-31 PROCEDURE — 87426 SARSCOV CORONAVIRUS AG IA: CPT | Performed by: FAMILY MEDICINE

## 2021-08-31 PROCEDURE — S9083 URGENT CARE CENTER GLOBAL: HCPCS | Performed by: FAMILY MEDICINE

## 2021-08-31 NOTE — PROGRESS NOTES
Patient presents with a request for COVID Testing. She was exposed to a COVID positive person. She is symptomatic with mild dry cough. She took Delsym with good effect. This patient was seen in [facility] at [address] [place] [address] Urgent Care while in their vehicle due to COVID-19 pandemic with PPE and focused examination in order to decrease community viral transmission. The patient/guardian gave verbal consent to treat. Past Medical History:   Diagnosis Date    Allergy, unspecified not elsewhere classified     Ankle sprain     bilaterally. 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 Dr. Renée Waller, OB. Azalea Marley, Genetic Counselor   Sonu Olivier.  Elevated liver enzymes     Heart palpitations 2016    Exertional.  Dr. Teetee Campoverde. Negative Exercise Stress Test.    Hyperinsulinemia 2015    Hypothyroidism due to acquired atrophy of thyroid     Morbid obesity due to excess calories ([address] 2017    Obesity, Class II, BMI 35-39.9, with comorbidity 2017    Pre-diabetes 2017    Previous on Metformin for treatment.  Pure hypercholesterolemia 2013        Past Surgical History:   Procedure Laterality Date    HX  SECTION      HX DILATION AND CURETTAGE  2016    due to Cystic Hygroma/Terrell's Syndrome fetus. Dr. Otoniel Fitzgerald   (placed),  (removed)    IUD. Dr. Quique Williamson.  HX TONSIL AND ADENOIDECTOMY  childhood    HX WISDOM TEETH EXTRACTION  2016    All 4 removed.          Family History   Problem Relation Age of Onset   Emy Farnsworth Arthritis-osteo Mother     Hypertension Mother    Emy Farnsworth Migraines Mother     Other Mother         IBS    Hypertension Maternal Uncle     Asthma Maternal Uncle     Hypertension Maternal Grandmother     Other Maternal Grandmother         diverticulosis    Arthritis-osteo Maternal Grandmother     Cancer Maternal Grandfather prostate    Arthritis-osteo Maternal Grandfather     Other Maternal Aunt         uterine fibroids    Obesity Other         Social History     Socioeconomic History    Marital status: SINGLE     Spouse name: Not on file    Number of children: 1    Years of education: Not on file    Highest education level: Not on file   Occupational History    Not on file   Tobacco Use    Smoking status: Never Smoker    Smokeless tobacco: Never Used   Vaping Use    Vaping Use: Never used   Substance and Sexual Activity    Alcohol use: Yes     Alcohol/week: 2.0 standard drinks     Types: 2 Glasses of wine per week     Comment: [place]rine[place] - 1 -2  Every weekend    Drug use: No    Sexual activity: Not Currently     Partners: Male     Birth control/protection: Condom   Other Topics Concern    Not on file   Social History Narrative    Not on file     Social Determinants of Health     Financial Resource Strain:     Difficulty of Paying Living Expenses:    Food Insecurity:     Worried About Running Out of Food in the Last Year:     [address] in the Last Year:    Transportation Needs:     Lack of Transportation (Medical):  Lack of Transportation (Non-Medical):    Physical Activity:     Days of Exercise per Week:     Minutes of Exercise per Session:    Stress:     Feeling of Stress :    Social Connections:     Frequency of Communication with Friends and Family:     Frequency of Social Gatherings with Friends and Family:     Attends Advent Services:     Active Member of Clubs or Organizations:     Attends Club or Organization Meetings:     Marital Status:    Intimate Partner Violence:     Fear of Current or Ex-Partner:     Emotionally Abused:     Physically Abused:     Sexually Abused: ALLERGIES: Latex, Other medication, Metformin, and Mirena [levonorgestrel]    Review of Systems   Constitutional: Negative for activity change, appetite change, chills, fatigue and fever.    HENT: Negative for congestion, rhinorrhea, sinus pressure and sinus pain. Respiratory: Positive for cough. Negative for chest tightness and shortness of breath. Cardiovascular: Negative for chest pain. Gastrointestinal: Negative for diarrhea, nausea and vomiting. Vitals:    08/31/21 1851   Pulse: 94   Resp: 18   Temp: 98.2 °F (36.8 °C)   SpO2: 97%       Physical Exam  Constitutional:       General: She is not in acute distress. Appearance: She is well-developed. She is not ill-appearing. HENT:      Head: Normocephalic. Right Ear: No drainage. Tympanic membrane is not erythematous or bulging. Left Ear: No drainage. Tympanic membrane is not erythematous or bulging. Nose: Nose normal. No congestion or rhinorrhea. Cardiovascular:      Rate and Rhythm: Normal rate. Pulmonary:      Effort: Pulmonary effort is normal. No respiratory distress. Breath sounds: No decreased breath sounds. Neurological:      Mental Status: She is alert. MDM    ICD-10-CM ICD-9-CM    1. Encounter for laboratory testing for COVID-19 virus  Z20.822 V01.79 AMB POC SARS-COV-2     No orders of the defined types were placed in this encounter. Results for orders placed or performed in visit on 08/31/21   AMB POC SARS-COV-2   Result Value Ref Range    SARS-COV-2 POC Negative Negative     The patients condition was discussed with the patient and they understand. The patient is to follow up with primary care doctor. If signs and symptoms become worse the pt is to go to the ER. The patient is to take medications as prescribed.      Procedures

## 2021-11-23 DIAGNOSIS — E03.4 HYPOTHYROIDISM DUE TO ACQUIRED ATROPHY OF THYROID: ICD-10-CM

## 2021-11-23 RX ORDER — LEVOTHYROXINE SODIUM 175 UG/1
TABLET ORAL
Qty: 30 TABLET | Refills: 2 | Status: SHIPPED | OUTPATIENT
Start: 2021-11-23 | End: 2022-01-26 | Stop reason: SDUPTHER

## 2022-01-20 DIAGNOSIS — E03.4 HYPOTHYROIDISM DUE TO ACQUIRED ATROPHY OF THYROID: ICD-10-CM

## 2022-01-20 RX ORDER — LEVOTHYROXINE SODIUM 175 UG/1
175 TABLET ORAL
Qty: 30 TABLET | Refills: 2 | Status: CANCELLED | OUTPATIENT
Start: 2022-01-20

## 2022-01-26 DIAGNOSIS — E03.4 HYPOTHYROIDISM DUE TO ACQUIRED ATROPHY OF THYROID: ICD-10-CM

## 2022-02-02 RX ORDER — LEVOTHYROXINE SODIUM 175 UG/1
175 TABLET ORAL
Qty: 30 TABLET | Refills: 2 | Status: SHIPPED | OUTPATIENT
Start: 2022-02-02 | End: 2022-02-02

## 2022-02-02 NOTE — TELEPHONE ENCOUNTER
Patient scheduled to come in 2/4/2022 for F/U on Thyroid and medication refill. Patient acknowledged.

## 2022-02-04 ENCOUNTER — OFFICE VISIT (OUTPATIENT)
Dept: FAMILY MEDICINE CLINIC | Age: 37
End: 2022-02-04
Payer: COMMERCIAL

## 2022-02-04 VITALS
HEART RATE: 75 BPM | WEIGHT: 236 LBS | RESPIRATION RATE: 16 BRPM | DIASTOLIC BLOOD PRESSURE: 81 MMHG | OXYGEN SATURATION: 97 % | TEMPERATURE: 98.8 F | SYSTOLIC BLOOD PRESSURE: 124 MMHG | HEIGHT: 63 IN | BODY MASS INDEX: 41.82 KG/M2

## 2022-02-04 DIAGNOSIS — E66.01 SEVERE OBESITY (BMI >= 40) (HCC): ICD-10-CM

## 2022-02-04 DIAGNOSIS — R73.03 PREDIABETES: ICD-10-CM

## 2022-02-04 DIAGNOSIS — M67.441 GANGLION CYST OF FINGER OF RIGHT HAND: ICD-10-CM

## 2022-02-04 DIAGNOSIS — Z11.59 NEED FOR HEPATITIS C SCREENING TEST: ICD-10-CM

## 2022-02-04 DIAGNOSIS — E03.4 HYPOTHYROIDISM DUE TO ACQUIRED ATROPHY OF THYROID: Primary | ICD-10-CM

## 2022-02-04 DIAGNOSIS — E78.2 MIXED HYPERLIPIDEMIA: ICD-10-CM

## 2022-02-04 LAB
ALBUMIN SERPL-MCNC: 4 G/DL (ref 3.5–5)
ALBUMIN/GLOB SERPL: 1.1 {RATIO} (ref 1.1–2.2)
ALP SERPL-CCNC: 78 U/L (ref 45–117)
ALT SERPL-CCNC: 18 U/L (ref 12–78)
ANION GAP SERPL CALC-SCNC: 4 MMOL/L (ref 5–15)
AST SERPL-CCNC: 11 U/L (ref 15–37)
BILIRUB SERPL-MCNC: 0.5 MG/DL (ref 0.2–1)
BUN SERPL-MCNC: 6 MG/DL (ref 6–20)
BUN/CREAT SERPL: 9 (ref 12–20)
CALCIUM SERPL-MCNC: 9.9 MG/DL (ref 8.5–10.1)
CHLORIDE SERPL-SCNC: 106 MMOL/L (ref 97–108)
CHOLEST SERPL-MCNC: 235 MG/DL
CO2 SERPL-SCNC: 26 MMOL/L (ref 21–32)
COMMENT, HOLDF: NORMAL
CREAT SERPL-MCNC: 0.7 MG/DL (ref 0.55–1.02)
EST. AVERAGE GLUCOSE BLD GHB EST-MCNC: 123 MG/DL
GLOBULIN SER CALC-MCNC: 3.7 G/DL (ref 2–4)
GLUCOSE SERPL-MCNC: 94 MG/DL (ref 65–100)
HBA1C MFR BLD: 5.9 % (ref 4–5.6)
HCV AB SERPL QL IA: NONREACTIVE
HDLC SERPL-MCNC: 46 MG/DL
HDLC SERPL: 5.1 {RATIO} (ref 0–5)
LDLC SERPL CALC-MCNC: 167.6 MG/DL (ref 0–100)
POTASSIUM SERPL-SCNC: 4.7 MMOL/L (ref 3.5–5.1)
PROT SERPL-MCNC: 7.7 G/DL (ref 6.4–8.2)
SAMPLES BEING HELD,HOLD: NORMAL
SODIUM SERPL-SCNC: 136 MMOL/L (ref 136–145)
T4 FREE SERPL-MCNC: 0.8 NG/DL (ref 0.8–1.5)
TRIGL SERPL-MCNC: 107 MG/DL (ref ?–150)
TSH SERPL DL<=0.05 MIU/L-ACNC: 14.5 UIU/ML (ref 0.36–3.74)
VLDLC SERPL CALC-MCNC: 21.4 MG/DL

## 2022-02-04 PROCEDURE — 99214 OFFICE O/P EST MOD 30 MIN: CPT | Performed by: STUDENT IN AN ORGANIZED HEALTH CARE EDUCATION/TRAINING PROGRAM

## 2022-02-04 RX ORDER — LEVOTHYROXINE SODIUM 125 UG/1
125 TABLET ORAL
COMMUNITY
End: 2022-02-04 | Stop reason: CLARIF

## 2022-02-04 RX ORDER — LEVOTHYROXINE SODIUM 175 UG/1
175 TABLET ORAL
COMMUNITY
End: 2022-02-08 | Stop reason: DRUGHIGH

## 2022-02-04 RX ORDER — LEVOTHYROXINE SODIUM 175 UG/1
175 TABLET ORAL
Qty: 90 TABLET | Refills: 2 | Status: SHIPPED | OUTPATIENT
Start: 2022-02-04 | End: 2022-02-08 | Stop reason: DRUGHIGH

## 2022-02-04 NOTE — PROGRESS NOTES
[address] [address] HEATHER Chapman. S[place], [address] [place] [address]  [phone]    C/C: Hypothyroidism and obesity    HPI:    Shabana Victoria is a 39 y.o. female who presents to clinic today for evaluation of the issues listed above. Current concerns; Hypothyroidism: Takes Synthroid 175mcg daily    Last TSH (05/13/2021): 15  States that she is doing better. Obesity: Previously on Saxenda in 2019 for weight loss but not currently taking. Initially had some weight loss but states that it stopped working. Weight has been overall stable since our last visit. Pt denies any  fever, chill, night sweats, chest pain, pressure, SOB. Other Health Habits and social history:  Smoking history: no  Alcohol history: socially  Occupation: works for a [employer]  Marital status: Patient is currently single and has one 15 yo son. Current Medications: Reviewed and updated in the chart. Allergies- reviewed: Allergies   Allergen Reactions    Latex Itching    Other Medication Rash     SPRINTEC    Metformin Diarrhea     severe    Mirena [Levonorgestrel] Other (comments)     Severe abdominal pain  GALACTORRHEA       Medications- reviewed:   Current Outpatient Medications   Medication Sig    levothyroxine (SYNTHROID) 175 mcg tablet Take 1 Tablet by mouth Daily (before breakfast).  levothyroxine (SYNTHROID) 175 mcg tablet Take 175 mcg by mouth Daily (before breakfast).  ergocalciferol (ERGOCALCIFEROL) 1,250 mcg (50,000 unit) capsule Take 1 Cap by mouth every seven (7) days. No current facility-administered medications for this visit. Past Medical History- reviewed:  Past Medical History:   Diagnosis Date    Allergy, unspecified not elsewhere classified     Ankle sprain 2002    bilaterally. 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 Dr. Sparkle Adams, OB.   Myron De León, Genetic Counselor   Helga Masters Dr. Rebecca Kent.  Elevated liver enzymes     Heart palpitations 2016    Exertional.  Dr. Brigido Martínez. Negative Exercise Stress Test.    Hyperinsulinemia 2015    Hypothyroidism due to acquired atrophy of thyroid     Morbid obesity due to excess calories ([address] 2017    Obesity, Class II, BMI 35-39.9, with comorbidity 2017    Pre-diabetes 2017    Previous on Metformin for treatment.  Pure hypercholesterolemia 2013       Past Surgical History- reviewed:   Past Surgical History:   Procedure Laterality Date    HX  SECTION  2009    HX DILATION AND CURETTAGE  2016    due to Cystic Hygroma/Terrell's Syndrome fetus. Dr. Duane Morrow   (placed),  (removed)    IUD. Dr. Merlinda Harbor.  HX TONSIL AND ADENOIDECTOMY  childhood    HX WISDOM TEETH EXTRACTION  2016    All 4 removed. Family History - reviewed:  Family History   Problem Relation Age of Onset    OSTEOARTHRITIS Mother     Hypertension Mother    Jeaneen Northern Migraines Mother     Other Mother         IBS    Hypertension Maternal Uncle     Asthma Maternal Uncle     Hypertension Maternal Grandmother     Other Maternal Grandmother         diverticulosis    OSTEOARTHRITIS Maternal Grandmother     Cancer Maternal Grandfather         prostate    OSTEOARTHRITIS Maternal Grandfather     Other Maternal Aunt         uterine fibroids    Obesity Other        Social History - reviewed:  Social History     Socioeconomic History    Marital status: SINGLE     Spouse name: Not on file    Number of children: 1    Years of education: Not on file    Highest education level: Not on file   Occupational History    Not on file   Tobacco Use    Smoking status: Never Smoker    Smokeless tobacco: Never Used   Vaping Use    Vaping Use: Never used   Substance and Sexual Activity    Alcohol use:  Yes     Alcohol/week: 2.0 standard drinks     Types: 2 Glasses of wine per week     Comment: Sue - 1 -2 Every weekend    Drug use: No    Sexual activity: Not Currently     Partners: Male     Birth control/protection: Condom   Other Topics Concern    Not on file   Social History Narrative    Not on file     Social Determinants of Health     Financial Resource Strain:     Difficulty of Paying Living Expenses: Not on file   Food Insecurity:     Worried About Running Out of Food in the Last Year: Not on file    Madisyn of Food in the Last Year: Not on file   Transportation Needs:     Lack of Transportation (Medical): Not on file    Lack of Transportation (Non-Medical): Not on file   Physical Activity:     Days of Exercise per Week: Not on file    Minutes of Exercise per Session: Not on file   Stress:     Feeling of Stress : Not on file   Social Connections:     Frequency of Communication with Friends and Family: Not on file    Frequency of Social Gatherings with Friends and Family: Not on file    Attends Jainism Services: Not on file    Active Member of [address] [place] [address] or Organizations: Not on file    Attends Club or Organization Meetings: Not on file    Marital Status: Not on file   Intimate Partner Violence:     Fear of Current or Ex-Partner: Not on file    Emotionally Abused: Not on file    Physically Abused: Not on file    Sexually Abused: Not on file   Housing Stability:     Unable to Pay for Housing in the Last Year: Not on file    Number of [place] in the Last Year: Not on file    Unstable Housing in the Last Year: Not on file       Review of systems:     A comprehensive review of systems was negative except for that written in the History of Present Illness. Visit Vitals  /81 (BP 1 Location: Right arm, BP Patient Position: Sitting, BP Cuff Size: Adult)   Pulse 75   Temp 98.8 °F (37.1 °C) (Oral)   Resp 16   Ht 5' 3\" (1.6 m)   Wt 236 lb (107 kg)   LMP 10/01/2021 Comment: Irregular Menstrual Cycles   SpO2 97%   BMI 41.81 kg/m²       General: Alert and oriented, in no acute distress. Well nourished. EYE: PERRL. Sclera and conjuctival clear. Extraocular movements intact. EARS: External normal, canals clear, tympanic membranes normal.   NOSE: Mucosa healthy without drainage or ulceration. OROPHARYNX: No suspicious lesions, normal dentition, pharynx, tongue and tonsils normal.  NECK: Supple; no masses; thyroid normal.  LUNGS: Respirations unlabored; clear to auscultation bilaterally. CARDIOVASCULAR: Regular, rate, and rhythm without murmurs, gallops or rubs. ABDOMEN: Soft; nontender; nondistended; normoactive bowel sounds; no masses or organomegaly. MUSCULOSKELETAL: FROM in all extremities     EXT: No edema. Neurovascularlly intact. Normal gait. SKIN: Ganglionic cyst on dorsum of right wrist  Neuro: Mental Status: Pt is alert and oriented to person, place, and time. Assessment/Plan       ICD-10-CM ICD-9-CM    1. Hypothyroidism due to acquired atrophy of thyroid  E03.4 244.8 TSH 3RD GENERATION     246.8 levothyroxine (SYNTHROID) 175 mcg tablet      T4, FREE      TSH 3RD GENERATION      T4, FREE   2. Need for hepatitis C screening test  Z11.59 V73.89 HEPATITIS C AB      HEPATITIS C AB   3. Mixed hyperlipidemia  E78.2 272.2 LIPID PANEL      LIPID PANEL   4. Prediabetes  R73.03 790.29 HEMOGLOBIN A1C WITH EAG      HEMOGLOBIN A1C WITH EAG   5. Severe obesity (BMI >= 40) (HCC)  V95.78 147.59 METABOLIC PANEL, COMPREHENSIVE      METABOLIC PANEL, COMPREHENSIVE   6. Ganglion cyst of finger of right hand  M67.441 727.43        1. Hypothyroidism due to acquired atrophy of thyroid  Last TSH (05/13/2021): 15.30%. Complains of intermittent constipation and fatigue which is possible due to poorly controlled TSH. Will recheck levels before any adjustments. - levothyroxine (SYNTHROID) 175 mcg tablet; Take 1 Tablet by mouth Daily (before breakfast). - T4, FREE; Future  - TSH 3RD GENERATION    2. Need for hepatitis C screening test  - HEPATITIS C AB; Future    3.  Mixed hyperlipidemia  - LIPID PANEL; Future    4. Prediabetes  - HEMOGLOBIN A1C WITH EAG; Future    5. Severe obesity (BMI >= 40) ([facility])  -  - I counseled on lifestyle changes; discussed of the importance of eating habits/patterns and physical activity to overall health. Also discussed the importance to avoid smoking and excessive alcohol consumption.  - Encouraged to eat foods that are baked, broiled, boiled, steamed or grilled. Avoid greasy or fried foods. Use olive oil or canola oil instead of vegetable oil. Eat fish twice weekly. Decreasing weight by 5-10% of baseline weight over the next 6 months if overweight/obese helps to improve obesity-related conditions. Eat a low carbohydrate diet. Decrease sugary beverages, white foods and sweets. Increase exercise to 5 days weekly for 30 minutes daily minimally and as tolerated. Have at an average of 7 hours of uninterrupted sleep at night.     - METABOLIC PANEL, COMPREHENSIVE; Future      6. Ganglion cyst of finger of right hand  Conservative measures. Observation for now given pt has no symptoms      Follow up: 8 weeks      I have discussed the diagnosis with the patient and the intended plan as seen in the above orders. The patient has received an after-visit summary and questions were answered concerning future plans. I have discussed medication side effects and warnings with the patient as well. Informed patient to return to the office if new symptoms arise.     Signed By: Mary Howard MD     February 4, 2022

## 2022-02-04 NOTE — PROGRESS NOTES
Name and  Verified. 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 Has  Small knot on wrist. Denies any injury. 1. Have you been to the ER, urgent care clinic since your last visit? Hospitalized since your last visit? No    2. Have you seen or consulted any other health care providers outside of the [address] [place] [address] since your last visit? Include any pap smears or colon screening.  No      Health Maintenance Due   Topic Date Due    Hepatitis C Screening  Never done    Depression Screen  Never done

## 2022-02-08 DIAGNOSIS — E03.8 OTHER SPECIFIED HYPOTHYROIDISM: Primary | ICD-10-CM

## 2022-02-08 RX ORDER — LEVOTHYROXINE SODIUM 200 UG/1
200 TABLET ORAL
Qty: 90 TABLET | Refills: 3 | Status: SHIPPED | OUTPATIENT
Start: 2022-02-08

## 2022-02-08 NOTE — PROGRESS NOTES
Reviewed. TSH remains very high (14.50). Will increase synthroid to 200mcg  HLD  Prediabetes . Called and left voicemail. Will send over [facility].

## 2022-03-18 PROBLEM — R20.0 NUMBNESS AND TINGLING IN LEFT ARM: Status: ACTIVE | Noted: 2017-07-25

## 2022-03-18 PROBLEM — M25.532 LEFT WRIST PAIN: Status: ACTIVE | Noted: 2017-07-25

## 2022-03-18 PROBLEM — E66.01 OBESITY, MORBID (HCC): Status: ACTIVE | Noted: 2020-01-31

## 2022-03-18 PROBLEM — R20.2 NUMBNESS AND TINGLING IN LEFT ARM: Status: ACTIVE | Noted: 2017-07-25

## 2022-03-19 PROBLEM — M54.9 ACUTE UPPER BACK PAIN: Status: ACTIVE | Noted: 2017-07-25

## 2022-03-19 PROBLEM — M54.2 NECK PAIN, BILATERAL POSTERIOR: Status: ACTIVE | Noted: 2017-07-25

## 2022-03-19 PROBLEM — R79.82 ELEVATED C-REACTIVE PROTEIN (CRP): Status: ACTIVE | Noted: 2017-02-28

## 2022-03-20 PROBLEM — M54.50 ACUTE BILATERAL LOW BACK PAIN WITHOUT SCIATICA: Status: ACTIVE | Noted: 2017-07-25

## 2022-08-16 NOTE — TELEPHONE ENCOUNTER
From: Heriberto Luong  To: Dorene Cochran DO  Sent: 5/6/2017 9:35 AM EDT  Subject: Medication Renewal Request    Original authorizing provider: DO Heriberto Molina would like a refill of the following medications:  levothyroxine (SYNTHROID) 137 mcg tablet Dorene Cochran DO]    Preferred pharmacy: [facility] #[id number] [address]    Comment: No

## 2022-12-29 ENCOUNTER — NURSE TRIAGE (OUTPATIENT)
Dept: OTHER | Facility: CLINIC | Age: 37
End: 2022-12-29

## 2022-12-29 ENCOUNTER — OFFICE VISIT (OUTPATIENT)
Dept: URGENT CARE | Age: 37
End: 2022-12-29
Payer: COMMERCIAL

## 2022-12-29 VITALS
OXYGEN SATURATION: 99 % | BODY MASS INDEX: 42.69 KG/M2 | DIASTOLIC BLOOD PRESSURE: 73 MMHG | TEMPERATURE: 99.1 F | RESPIRATION RATE: 16 BRPM | SYSTOLIC BLOOD PRESSURE: 117 MMHG | WEIGHT: 241 LBS | HEART RATE: 102 BPM

## 2022-12-29 DIAGNOSIS — R68.83 CHILLS: ICD-10-CM

## 2022-12-29 DIAGNOSIS — J40 BRONCHITIS: Primary | ICD-10-CM

## 2022-12-29 DIAGNOSIS — Z11.59 SCREENING FOR VIRAL DISEASE: ICD-10-CM

## 2022-12-29 DIAGNOSIS — R05.1 ACUTE COUGH: ICD-10-CM

## 2022-12-29 LAB
FLUAV+FLUBV AG NOSE QL IA.RAPID: NEGATIVE
FLUAV+FLUBV AG NOSE QL IA.RAPID: NEGATIVE
SARS-COV-2 PCR, POC: NEGATIVE
VALID INTERNAL CONTROL?: YES

## 2022-12-29 PROCEDURE — 99213 OFFICE O/P EST LOW 20 MIN: CPT | Performed by: FAMILY MEDICINE

## 2022-12-29 PROCEDURE — 87804 INFLUENZA ASSAY W/OPTIC: CPT | Performed by: FAMILY MEDICINE

## 2022-12-29 PROCEDURE — 87635 SARS-COV-2 COVID-19 AMP PRB: CPT | Performed by: FAMILY MEDICINE

## 2022-12-29 RX ORDER — BENZONATATE 200 MG/1
200 CAPSULE ORAL
Qty: 30 CAPSULE | Refills: 0 | Status: SHIPPED | OUTPATIENT
Start: 2022-12-29

## 2022-12-29 RX ORDER — AZITHROMYCIN 250 MG/1
TABLET, FILM COATED ORAL
Qty: 6 TABLET | Refills: 0 | Status: SHIPPED | OUTPATIENT
Start: 2022-12-29

## 2022-12-29 NOTE — PROGRESS NOTES
Iva Londono is a 40 y.o. female who presents with worsening productive cough, nasal congestion, chills, body aches and chest tightness x 2 days. Denies fever, SOB, N/V/D. Hx of PNA. The history is provided by the patient. Past Medical History:   Diagnosis Date    Allergy, unspecified not elsewhere classified     Ankle sprain     bilaterally. 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 Dr. Stewart Kinney, OB. José Miguel Savage, Genetic Counselor    Dysmenorrhea      [address] [place] [address]. Elevated liver enzymes     Heart palpitations 2016    Exertional.  Dr. Lexii Mims. Negative Exercise Stress Test.    Hyperinsulinemia 2015    Hypothyroidism due to acquired atrophy of thyroid     Morbid obesity due to excess calories ([address] 2017    Obesity, Class II, BMI 35-39.9, with comorbidity 2017    Pre-diabetes 2017    Previous on Metformin for treatment. Pure hypercholesterolemia 2013        Past Surgical History:   Procedure Laterality Date    HX  SECTION      HX DILATION AND CURETTAGE  2016    due to Cystic Hygroma/Terrell's Syndrome fetus. Dr. Stewart Kinney    HX GYN   (placed),  (removed)    IUD. Dr. Henry Blair. HX TONSIL AND ADENOIDECTOMY  childhood    HX WISDOM TEETH EXTRACTION  2016    All 4 removed.          Family History   Problem Relation Age of Onset    OSTEOARTHRITIS Mother     Hypertension Mother     Migraines Mother     Other Mother         IBS    Hypertension Maternal Uncle     Asthma Maternal Uncle     Hypertension Maternal Grandmother     Other Maternal Grandmother         diverticulosis    OSTEOARTHRITIS Maternal Grandmother     Cancer Maternal Grandfather         prostate    OSTEOARTHRITIS Maternal Grandfather     Other Maternal Aunt         uterine fibroids    Obesity Other         Social History     Socioeconomic History    Marital status: SINGLE     Spouse name: Not on file    Number of children: 1    Years of education: Not on file    Highest education level: Not on file   Occupational History    Not on file   Tobacco Use    Smoking status: Never    Smokeless tobacco: Never   Vaping Use    Vaping Use: Never used   Substance and Sexual Activity    Alcohol use: Yes     Alcohol/week: 2.0 standard drinks     Types: 2 Glasses of wine per week     Comment: Sue - 1 -2  Every weekend    Drug use: No    Sexual activity: Not Currently     Partners: Male     Birth control/protection: Condom   Other Topics Concern    Not on file   Social History Narrative    Not on file     Social Determinants of Health     Financial Resource Strain: Not on file   Food Insecurity: Not on file   Transportation Needs: Not on file   Physical Activity: Not on file   Stress: Not on file   Social Connections: Not on file   Intimate Partner Violence: Not on file   Housing Stability: Not on file                ALLERGIES: Latex, Other medication, Metformin, and Mirena [levonorgestrel]    Review of Systems   Constitutional:  Positive for chills. Negative for activity change, appetite change and fever. HENT:  Positive for congestion and ear pain. Negative for sore throat. Respiratory:  Positive for cough. Negative for shortness of breath. Gastrointestinal:  Negative for diarrhea, nausea and vomiting. Musculoskeletal:  Positive for myalgias. Vitals:    12/29/22 1106   BP: 117/73   Pulse: (!) 102   Resp: 16   Temp: 99.1 °F (37.3 °C)   SpO2: 99%   Weight: 241 lb (109.3 kg)       Physical Exam  Vitals and nursing note reviewed. Constitutional:       General: She is not in acute distress. Appearance: She is well-developed. She is not diaphoretic. HENT:      Right Ear: Tympanic membrane, ear canal and external ear normal.      Left Ear: Tympanic membrane, ear canal and external ear normal.      Nose: Congestion present. Right Sinus: Maxillary sinus tenderness and frontal sinus tenderness present.       Left Sinus: Maxillary sinus tenderness and frontal sinus tenderness present. Mouth/Throat:      Pharynx: No oropharyngeal exudate or posterior oropharyngeal erythema. Tonsils: No tonsillar abscesses. Cardiovascular:      Rate and Rhythm: Normal rate and regular rhythm. Heart sounds: Normal heart sounds. Pulmonary:      Effort: Pulmonary effort is normal. No respiratory distress. Breath sounds: Normal breath sounds. No stridor. No wheezing, rhonchi or rales. Lymphadenopathy:      Cervical: No cervical adenopathy. Neurological:      Mental Status: She is alert. Psychiatric:         Behavior: Behavior normal.         Thought Content: Thought content normal.         Judgment: Judgment normal.       [facility]    ICD-10-CM ICD-9-CM   1. Bronchitis  J40 490   2. Acute cough  R05.1 786.2   3. Chills  R68.83 780.64   4. Screening for viral disease  Z11.59 V73.99       Orders Placed This Encounter    AMB POC VITALIY INFLUENZA A/B TEST    POCT COVID-19, SARS-COV-2, PCR     Order Specific Question:   Is this test for diagnosis or screening? Answer:   Diagnosis of ill patient     Order Specific Question:   Symptomatic for COVID-19 as defined by CDC? Answer:   Yes     Order Specific Question:   Date of Symptom Onset     Answer:   12/27/2022     Order Specific Question:   Hospitalized for COVID-19? Answer:   No     Order Specific Question:   Admitted to ICU for COVID-19? Answer:   No     Order Specific Question:   Employed in healthcare setting? Answer:   Unknown     Order Specific Question:   Resident in a congregate (group) care setting? Answer:   Unknown     Order Specific Question:   Pregnant? Answer:   Unknown     Order Specific Question:   Previously tested for COVID-19?      Answer:   Unknown    azithromycin (ZITHROMAX) 250 mg tablet     Sig: Take two tablets today then one tablet daily     Dispense:  6 Tablet     Refill:  0    benzonatate (TESSALON) 200 mg capsule     Sig: Take 1 Capsule by mouth three (3) times daily as needed for Cough. Dispense:  30 Capsule     Refill:  0        The patient is to follow up with PCP [facility]. If signs and symptoms become worse the pt is to go to the ER.      Results for orders placed or performed in visit on 12/29/22   AMB POC VITALIY INFLUENZA A/B TEST   Result Value Ref Range    VALID INTERNAL CONTROL POC Yes     Influenza A Ag POC Negative Negative    Influenza B Ag POC Negative Negative   POCT COVID-19, SARS-COV-2, PCR   Result Value Ref Range    SARS-COV-2 PCR, POC Negative Negative         Procedures

## 2022-12-29 NOTE — LETTER
NOTIFICATION TO RETURN TO WORK / SCHOOL    12/29/22     Ms. [address] [facility]   [address] [phone]       To Whom It May Concern:    Isabella Menchaca was seen today at [facility]. She  will return to work on 1/2/2023. If there are questions or concerns please have the patient contact our office.         Sincerely,      Patti Fernandez MD

## 2022-12-29 NOTE — TELEPHONE ENCOUNTER
[facility]    Received call from XAVIER Mace 20 at [facility] with Red Flag Complaint. Subjective: Caller states \"I have chest tightness, left ear ache, headache, chills, body ache, cough\"     Current Symptoms:     Onset: 2 days ago;     Associated Symptoms:     Pain Severity: 4/10; aching;     Temperature: denies fever     What has been tried: cough drops, Tylenol    LMP: NA Pregnant: No    Recommended disposition: See in Office Today    Care advice provided, patient verbalizes understanding; denies any other questions or concerns; instructed to call back for any new or worsening symptoms. Patient/Caller agrees with recommended disposition; writer provided warm transfer to Kaylah  at [facility] for appointment scheduling    Attention Provider: Thank you for allowing me to participate in the care of your patient. The patient was connected to triage in response to information provided to the [facility]. Please do not respond through this encounter as the response is not directed to a shared pool. Reason for Disposition   [place]    Protocols used:  Influenza - Seasonal-ADULT-[place]

## 2023-09-15 RX ORDER — LEVOTHYROXINE SODIUM 0.2 MG/1
200 TABLET ORAL
Qty: 30 TABLET | Refills: 0 | OUTPATIENT
Start: 2023-09-15

## 2024-05-03 NOTE — PROGRESS NOTES
Interventional Radiology Laura Emmanuel  Identified pt with two pt identifiers(name and ). Chief Complaint Patient presents with  Weight Management  Blood Pressure Check  Medication Refill  
  phentermine 1. Have you been to the ER, urgent care clinic since your last visit? Hospitalized since your last visit? No 
 
2. Have you seen or consulted any other health care providers outside of the [address] [place] [address] since your last visit? Include any pap smears or colon screening. No 
 
In the event something were to happen to you and you were unable to speak on your behalf, do you have an Advance Directive/ Living Will in place stating your wishes? NO If yes, do we have a copy on file NO If no, would you like information:     
 
 
 
Would you like to sign up for MyChart today, if you have not already done so? Patient has a mychart If not, would you like information on MyChart, and how to sign up at a later time? No 
 
 
Medication reconciliation up to date and corrected with patient at this time. Today's provider has been notified of reason for visit, vitals and flowsheets obtained on patients. Reviewed record in preparation for visit, huddled with provider and have obtained necessary documentation. There are no preventive care reminders to display for this patient. Wt Readings from Last 3 Encounters:  
10/02/18 210 lb 14.4 oz (95.7 kg) 18 218 lb 14.4 oz (99.3 kg) 18 219 lb 12.8 oz (99.7 kg) Temp Readings from Last 3 Encounters:  
18 98.3 °F (36.8 °C) (Oral) 18 98.1 °F (36.7 °C) (Oral) 08/15/17 98.6 °F (37 °C) (Oral) BP Readings from Last 3 Encounters:  
18 113/72  
18 119/84  
08/15/17 112/76 Pulse Readings from Last 3 Encounters:  
18 79  
18 90  
08/15/17 74 Vitals:  
 10/02/18 1522 BP: 120/79 Pulse: (!) 102 Resp: 16 Temp: 97.9 °F (36.6 °C) TempSrc: Temporal  
SpO2: 95% Weight: 210 lb 14.4 oz (95.7 kg) Height: 5' 3\" (1.6 m) PainSc:   0 - No pain LMP: 09/19/2018 Learning Assessment: 
:  
 
Learning Assessment 6/19/2018 3/23/2015 PRIMARY LEARNER Patient Patient HIGHEST LEVEL OF EDUCATION - PRIMARY LEARNER  - 2 YEARS OF COLLEGE  
BARRIERS PRIMARY LEARNER NONE NONE  
CO-LEARNER CAREGIVER No - PRIMARY LANGUAGE ENGLISH ENGLISH  
LEARNER PREFERENCE PRIMARY DEMONSTRATION DEMONSTRATION  
ANSWERED BY patient patient RELATIONSHIP SELF SELF Depression Screening: 
:  
 
PHQ over the last two weeks 6/19/2018 Little interest or pleasure in doing things Not at all Feeling down, depressed, irritable, or hopeless Not at all Total Score PHQ 2 0 Fall Risk Assessment: 
:  
 
Fall Risk Assessment, last 12 mths 6/19/2018 Able to walk? Yes Fall in past 12 months? No  
 
 
Abuse Screening: 
:  
 
Abuse Screening Questionnaire 6/19/2018 6/28/2016 Do you ever feel afraid of your partner? Marinell Bur Are you in a relationship with someone who physically or mentally threatens you? Marinell Bur Is it safe for you to go home? Y Y  
 
 
ADL Screening: 
:  
 
